# Patient Record
Sex: FEMALE | Race: ASIAN | NOT HISPANIC OR LATINO | Employment: PART TIME | URBAN - METROPOLITAN AREA
[De-identification: names, ages, dates, MRNs, and addresses within clinical notes are randomized per-mention and may not be internally consistent; named-entity substitution may affect disease eponyms.]

---

## 2017-02-13 ENCOUNTER — OFFICE VISIT (OUTPATIENT)
Dept: FAMILY MEDICINE | Facility: CLINIC | Age: 37
End: 2017-02-13
Payer: COMMERCIAL

## 2017-02-13 VITALS
BODY MASS INDEX: 32.59 KG/M2 | WEIGHT: 177.13 LBS | DIASTOLIC BLOOD PRESSURE: 72 MMHG | TEMPERATURE: 98.1 F | OXYGEN SATURATION: 99 % | RESPIRATION RATE: 16 BRPM | HEIGHT: 62 IN | SYSTOLIC BLOOD PRESSURE: 108 MMHG | HEART RATE: 85 BPM

## 2017-02-13 DIAGNOSIS — J02.9 VIRAL PHARYNGITIS: ICD-10-CM

## 2017-02-13 DIAGNOSIS — R07.0 THROAT PAIN: Primary | ICD-10-CM

## 2017-02-13 LAB
DEPRECATED S PYO AG THROAT QL EIA: NORMAL
MICRO REPORT STATUS: NORMAL
SPECIMEN SOURCE: NORMAL

## 2017-02-13 PROCEDURE — 87081 CULTURE SCREEN ONLY: CPT | Performed by: PHYSICIAN ASSISTANT

## 2017-02-13 PROCEDURE — 99213 OFFICE O/P EST LOW 20 MIN: CPT | Performed by: PHYSICIAN ASSISTANT

## 2017-02-13 PROCEDURE — 87880 STREP A ASSAY W/OPTIC: CPT | Performed by: PHYSICIAN ASSISTANT

## 2017-02-13 NOTE — MR AVS SNAPSHOT
After Visit Summary   2/13/2017    Ines Gaitan    MRN: [id number]           Patient Information     Date Of Birth          1980        Visit Information        Provider Department      2/13/2017 2:20 PM Radha Rose PA-C [facility]        Today's Diagnoses     Throat pain    -  1      Care Instructions    [facility]   Discharged by : Veronica PRADO MA    If you have any questions regarding your visit please contact your care team:     [facility] Clinic Hours Telephone Number   Dr. Gill Rose PA-C   7am-7pm Monday - Thursday   7am-5pm Fridays  [phone]   (Appointment scheduling available 24/7)   RN Line   [phone] option 2       For a Price Quote for your services, please call our Consumer Price Line at [phone].     What options do I have for visits at the clinic other than the traditional office visit?     To expand how we care for you, many of our providers are utilizing electronic visits (e-visits) and telephone visits, when medically appropriate, for interactions with their patients rather than a visit in the clinic. We also offer nurse visits for many medical concerns. Just like any other service, we will bill your insurance company for this type of visit based on time spent on the phone with your provider. Not all insurance companies cover these visits. Please check with your medical insurance if this type of visit is covered. You will be responsible for any charges that are not paid by your insurance.   E-visits via [employer]: generally incur a $35.00 fee.     Telephone visits:   Time spent on the phone: *charged based on time that is spent on the phone in increments of 10 minutes. Estimated cost:   5-10 mins $30.00   11-20 mins. $59.00   21-30 mins. $85.00     Use [employer] (secure email communication and access to your chart) to send your primary care provider a message or make an  appointment. Ask someone on your Team how to sign up for [employer].     As always, Thank you for trusting us with your health care needs!      [facility]:    Scheduling Appointments  [place]  Call: [phone]    [place]isty Parekh, [facility]  Call: [phone]    [facility]  Call: [phone]      WHERE TO GO FOR CARE?    Clinic    Make an appointment if you:       Are sick (cold, cough, flu, sore throat, earache or in pain).       Have a small injury (sprain, small cut, burn or broken bone).       Need a physical exam, Pap smear, vaccine or prescription refill.       Have questions about your health or medicines.    To reach us:      Call [phone] ([phone]). Open 24 hours every day. (For counseling services, call [phone].)    Log into [employer] at [email or web address]. (Visit [email or web address] to create an account.) Hospital emergency room    An emergency is a serious or life- threatening problem that must be treated right away.    Call 554 or get to the hospital if you have:      Very bad or sudden:            - Chest pain or pressure         - Bleeding         - Head or belly pain         - Dizziness or trouble seeing, walking or                          Speaking      Problems breathing      Blood in your vomit or you are coughing up blood      A major injury (knocked out, loss of a finger or limb, rape, broken bone protruding from skin)    A mental health crisis. (Or call the Mental Health Crisis line at 1-710.250.1487 or Suicide Prevention Hotline at 1-833.142.7942.)    Open 24 hours every day. You don't need an appointment.     Urgent care    Visit urgent care for sickness or small injuries when the clinic is closed. You don't need an appointment. To check hours or find an urgent care near you, visit [email or web address]. Online care    Get online care from [facility] for more than 70 common problems, like  colds, allergies and infections. Open 24 hours every day at: [email or web address]   Need help deciding?    For advice about where to be seen, you may call your clinic and ask to speak with a nurse. We're here for you 24 hours every day.         If you are deaf or hard of hearing, please let us know. We provide many free services including sign language interpreters, oral interpreters, TTYs, telephone amplifiers, note takers and written materials.                       Follow-ups after your visit        Your next 10 appointments already scheduled     Feb 22, 2017  1:20 PM CST   PHYSICAL with Guy Paulson, DO   [facility]    [address] [place] [id number] [phone]              Who to contact     If you have questions or need follow up information about today's clinic visit or your schedule please contact [facility] directly at [phone].  Normal or non-critical lab and imaging results will be communicated to you by Spectral Imagehart, letter or phone within 4 business days after the clinic has received the results. If you do not hear from us within 7 days, please contact the clinic through [employer] or phone. If you have a critical or abnormal lab result, we will notify you by phone as soon as possible.  Submit refill requests through [employer] or call your pharmacy and they will forward the refill request to us. Please allow 3 business days for your refill to be completed.          Additional Information About Your Visit        [employer] Information     [employer] gives you secure access to your electronic health record. If you see a primary care provider, you can also send messages to your care team and make appointments. If you have questions, please call your primary care clinic.  If you do not have a primary care provider, please call [phone] and they will assist you.        Care EveryWhere ID     This is your Care  "EveryWhere ID. This could be used by other organizations to access your [facility] medical records  [id number]        Your Vitals Were     Pulse Temperature Respirations Height Pulse Oximetry BMI (Body Mass Index)    85 98.1  F (36.7  C) (Oral) 16 5' 2\" (1.575 m) 99% 32.4 kg/m2       Blood Pressure from Last 3 Encounters:   02/13/17 108/72   11/07/16 111/78   10/14/16 108/71    Weight from Last 3 Encounters:   02/13/17 177 lb 2 oz (80.3 kg)   11/07/16 174 lb 3.2 oz (79 kg)   10/14/16 168 lb 14.4 oz (76.6 kg)              We Performed the Following     Beta strep group A culture     Rapid strep screen          Today's Medication Changes          These changes are accurate as of: 2/13/17  3:02 PM.  If you have any questions, ask your nurse or doctor.               Start taking these medicines.        Dose/Directions    lidocaine 2 % solution   Commonly known as:  XYLOCAINE   Used for:  Throat pain   Started by:  Radha Rose PA-C        Dose:  15 mL   Take 15 mLs by mouth every 6 hours as needed for moderate pain swish and spit every 3-8 hours as needed; max 8 doses/24 hour period   Quantity:  100 mL   Refills:  0            Where to get your medicines      These medications were sent to [facility] [address].  [address] [place] [address]     Phone:  [phone]     lidocaine 2 % solution                Primary Care Provider Office Phone # Fax Daniel Cesarjoselin Paulson  [phone]       [place] [phone]        Thank you!     Thank you for choosing [facility]  for your care. Our goal is always to provide you with excellent care. Hearing back from our patients is one way we can continue to improve our services. Please take a few minutes to complete the written survey that you may receive in the mail after your visit with us. Thank you!             Your " Updated Medication List - Protect others around you: Learn how to safely use, store and throw away your medicines at www.disposemymeds.org.          This list is accurate as of: 2/13/17  3:02 PM.  Always use your most recent med list.                   Brand Name Dispense Instructions for use    ferrous sulfate 325 (65 FE) MG tablet    IRON    30 tablet    Take 1 tablet (325 mg) by mouth daily (with breakfast)       lidocaine 2 % solution    XYLOCAINE    100 mL    Take 15 mLs by mouth every 6 hours as needed for moderate pain swish and spit every 3-8 hours as needed; max 8 doses/24 hour period

## 2017-02-13 NOTE — PATIENT INSTRUCTIONS
[facility]   Discharged by : Veronica PRADO MA    If you have any questions regarding your visit please contact your care team:     [facility] Clinic Hours Telephone Number   Dr. Gill Rose PA-C   7am-7pm Monday - Thursday   7am-5pm Fridays  [phone]   (Appointment scheduling available 24/7)   RN Line   [phone] option 2       For a Price Quote for your services, please call our Consumer Price Line at [phone].     What options do I have for visits at the clinic other than the traditional office visit?     To expand how we care for you, many of our providers are utilizing electronic visits (e-visits) and telephone visits, when medically appropriate, for interactions with their patients rather than a visit in the clinic. We also offer nurse visits for many medical concerns. Just like any other service, we will bill your insurance company for this type of visit based on time spent on the phone with your provider. Not all insurance companies cover these visits. Please check with your medical insurance if this type of visit is covered. You will be responsible for any charges that are not paid by your insurance.   E-visits via [employer]: generally incur a $35.00 fee.     Telephone visits:   Time spent on the phone: *charged based on time that is spent on the phone in increments of 10 minutes. Estimated cost:   5-10 mins $30.00   11-20 mins. $59.00   21-30 mins. $85.00     Use [employer] (secure email communication and access to your chart) to send your primary care provider a message or make an appointment. Ask someone on your Team how to sign up for [employer].     As always, Thank you for trusting us with your health care needs!      [facility]:    Scheduling Appointments  [place]  Call: [phone]    Misty Parekh [facility]  Call: [phone]    [facility]  Locations  Call: [phone]      WHERE TO GO FOR CARE?    Clinic    Make an appointment if you:       Are sick (cold, cough, flu, sore throat, earache or in pain).       Have a small injury (sprain, small cut, burn or broken bone).       Need a physical exam, Pap smear, vaccine or prescription refill.       Have questions about your health or medicines.    To reach us:      Call [phone] ([phone]). Open 24 hours every day. (For counseling services, call [phone].)    Log into [employer] at [employer]. (Visit Terralliance to create an account.) Hospital emergency room    An emergency is a serious or life- threatening problem that must be treated right away.    Call 279 or get to the hospital if you have:      Very bad or sudden:            - Chest pain or pressure         - Bleeding         - Head or belly pain         - Dizziness or trouble seeing, walking or                          Speaking      Problems breathing      Blood in your vomit or you are coughing up blood      A major injury (knocked out, loss of a finger or limb, rape, broken bone protruding from skin)    A mental health crisis. (Or call the Mental Health Crisis line at 1-655.911.1532 or Suicide Prevention Hotline at 1-763.986.1704.)    Open 24 hours every day. You don't need an appointment.     Urgent care    Visit urgent care for sickness or small injuries when the clinic is closed. You don't need an appointment. To check hours or find an urgent care near you, visit [email or web address] ([email or web address]. Online care    Get online care from [employer] for more than 70 common problems, like colds, allergies and infections. Open 24 hours every day at: [email or web address] ([email or web address]   Need help deciding?    For advice about where to be seen, you may call your clinic and ask to speak with a nurse. We're here for you 24 hours every day.         If you are deaf or hard of hearing, please let us know. We provide many free services including  sign language interpreters, oral interpreters, TTYs, telephone amplifiers, note takers and written materials.

## 2017-02-13 NOTE — NURSING NOTE
"Chief Complaint   Patient presents with     Pharyngitis       Initial /72 (BP Location: Right arm, Cuff Size: Adult Regular)  Pulse 85  Temp 98.1  F (36.7  C) (Oral)  Resp 16  Ht 5' 2\" (1.575 m)  Wt 177 lb 2 oz (80.3 kg)  SpO2 99%  BMI 32.4 kg/m2 Estimated body mass index is 32.4 kg/(m^2) as calculated from the following:    Height as of this encounter: 5' 2\" (1.575 m).    Weight as of this encounter: 177 lb 2 oz (80.3 kg).  Medication Reconciliation: complete     Gill CRISTINA, Certified Medical Assistant (AAMA)February 13, 2017 2:22 PM      "

## 2017-02-13 NOTE — PROGRESS NOTES
"  SUBJECTIVE:                                                    Ines Gaitan is a 36 year old female who presents to clinic today for the following health issues:      ENT Symptoms             Symptoms: cc Present Absent Comment   Fever/Chills  x  chills   Fatigue  x     Muscle Aches  x  Better today   Eye Irritation   x    Sneezing   x    Nasal Rolando/Drg   x    Sinus Pressure/Pain   x    Loss of smell   x    Dental pain   x    Sore Throat x      Swollen Glands   ?    Ear Pain/Fullness  x  Both ears   Cough  x     Wheeze   x    Chest Pain   x    Shortness of breath   x    Rash   x    Other  x  Headache     Symptom duration:  2/9/17   Symptom severity:  Moderate/severe (yesterday) today is 8/10 for pain   Treatments tried:  cough drops, salt water gargle, dayquil caps   Contacts:  none     Started on 9th with irritation in throat. Throat started hurting with swallowing and increased to the ears.         -------------------------------------    Problem list, Medication list, Allergies, and Medical/Social/Surgical histories reviewed in [facility] and updated as appropriate.    ROS:  A pertinent ROS of the General  HEENT  Cardiovascular  Pulmonary  GI systems is otherwise unremarkable.      OBJECTIVE:                                                    /72 (BP Location: Right arm, Cuff Size: Adult Regular)  Pulse 85  Temp 98.1  F (36.7  C) (Oral)  Resp 16  Ht 5' 2\" (1.575 m)  Wt 177 lb 2 oz (80.3 kg)  SpO2 99%  BMI 32.4 kg/m2   GENERAL: healthy, alert and no distress  HENT: ear canals- normal; TMs- normal; Nose- normal; Mouth- no ulcers, no lesions  NECK: no tenderness, no adenopathy, no asymmetry, no masses, no stiffness; thyroid- normal to palpation  RESP: lungs clear to auscultation - no rales, no rhonchi, no wheezes  CV: regular rates and rhythm, normal S1 S2, no S3 or S4 and no murmur, no click or rub -  MS: extremities- no gross deformities noted, no edema    Diagnostic test results:  none      ASSESSMENT/PLAN:    "                                                       ICD-10-CM    1. Throat pain R07.0 Rapid strep screen     Beta strep group A culture     lidocaine (XYLOCAINE) 2 % solution   2. Viral pharyngitis J02.9        Discussed treatment and supportive care for patient's symptoms.  Patient will try lidocaine for throat pain and use ibuprofen 600 mg or tylenol.     Follow up with Provider - 2 weeks if still coughing, sooner with fevers, shortness of breath or wheezing or if throat pain worsens.     Radha Rose PA-C  [facility]

## 2017-02-15 LAB
BACTERIA SPEC CULT: NORMAL
MICRO REPORT STATUS: NORMAL
SPECIMEN SOURCE: NORMAL

## 2017-03-08 ENCOUNTER — OFFICE VISIT (OUTPATIENT)
Dept: FAMILY MEDICINE | Facility: CLINIC | Age: 37
End: 2017-03-08
Payer: COMMERCIAL

## 2017-03-08 VITALS
BODY MASS INDEX: 31.89 KG/M2 | WEIGHT: 180 LBS | TEMPERATURE: 98.7 F | HEART RATE: 84 BPM | HEIGHT: 63 IN | DIASTOLIC BLOOD PRESSURE: 72 MMHG | SYSTOLIC BLOOD PRESSURE: 100 MMHG

## 2017-03-08 DIAGNOSIS — Z13.5 SCREENING FOR DIABETIC RETINOPATHY: ICD-10-CM

## 2017-03-08 DIAGNOSIS — Z87.42 HISTORY OF FEMALE INFERTILITY: ICD-10-CM

## 2017-03-08 DIAGNOSIS — O03.9 MISCARRIAGE: ICD-10-CM

## 2017-03-08 DIAGNOSIS — E55.9 VITAMIN D DEFICIENCY: ICD-10-CM

## 2017-03-08 DIAGNOSIS — N92.6 MISSED PERIOD: ICD-10-CM

## 2017-03-08 DIAGNOSIS — Z00.01 ENCOUNTER FOR ROUTINE ADULT HEALTH EXAMINATION WITH ABNORMAL FINDINGS: Primary | ICD-10-CM

## 2017-03-08 DIAGNOSIS — D50.9 IRON DEFICIENCY ANEMIA, UNSPECIFIED IRON DEFICIENCY ANEMIA TYPE: ICD-10-CM

## 2017-03-08 DIAGNOSIS — E05.90 SUBCLINICAL HYPERTHYROIDISM: ICD-10-CM

## 2017-03-08 LAB
BASOPHILS # BLD AUTO: 0 10E9/L (ref 0–0.2)
BASOPHILS NFR BLD AUTO: 0.3 %
BETA HCG QUAL IFA URINE: NEGATIVE
DIFFERENTIAL METHOD BLD: ABNORMAL
EOSINOPHIL # BLD AUTO: 0.1 10E9/L (ref 0–0.7)
EOSINOPHIL NFR BLD AUTO: 1.5 %
ERYTHROCYTE [DISTWIDTH] IN BLOOD BY AUTOMATED COUNT: 20.6 % (ref 10–15)
HCT VFR BLD AUTO: 28.8 % (ref 35–47)
HGB BLD-MCNC: 8.4 G/DL (ref 11.7–15.7)
LYMPHOCYTES # BLD AUTO: 2.1 10E9/L (ref 0.8–5.3)
LYMPHOCYTES NFR BLD AUTO: 34 %
MCH RBC QN AUTO: 18.5 PG (ref 26.5–33)
MCHC RBC AUTO-ENTMCNC: 29.2 G/DL (ref 31.5–36.5)
MCV RBC AUTO: 63 FL (ref 78–100)
MONOCYTES # BLD AUTO: 0.6 10E9/L (ref 0–1.3)
MONOCYTES NFR BLD AUTO: 8.9 %
NEUTROPHILS # BLD AUTO: 3.4 10E9/L (ref 1.6–8.3)
NEUTROPHILS NFR BLD AUTO: 55.3 %
PLATELET # BLD AUTO: 328 10E9/L (ref 150–450)
RBC # BLD AUTO: 4.55 10E12/L (ref 3.8–5.2)
WBC # BLD AUTO: 6.2 10E9/L (ref 4–11)

## 2017-03-08 PROCEDURE — 84443 ASSAY THYROID STIM HORMONE: CPT | Performed by: FAMILY MEDICINE

## 2017-03-08 PROCEDURE — 36415 COLL VENOUS BLD VENIPUNCTURE: CPT | Performed by: FAMILY MEDICINE

## 2017-03-08 PROCEDURE — 99212 OFFICE O/P EST SF 10 MIN: CPT | Mod: 25 | Performed by: FAMILY MEDICINE

## 2017-03-08 PROCEDURE — 84703 CHORIONIC GONADOTROPIN ASSAY: CPT | Performed by: FAMILY MEDICINE

## 2017-03-08 PROCEDURE — 85025 COMPLETE CBC W/AUTO DIFF WBC: CPT | Performed by: FAMILY MEDICINE

## 2017-03-08 PROCEDURE — 99395 PREV VISIT EST AGE 18-39: CPT | Performed by: FAMILY MEDICINE

## 2017-03-08 RX ORDER — FERROUS SULFATE 325(65) MG
325 TABLET ORAL 2 TIMES DAILY
Qty: 60 TABLET | Refills: 2 | Status: SHIPPED | OUTPATIENT
Start: 2017-03-08 | End: 2017-10-06

## 2017-03-08 NOTE — PROGRESS NOTES
SUBJECTIVE:     CC: Ines Gaitan is an 36 year old woman who presents for preventive health visit.     Healthy Habits:    Do you get at least three servings of calcium containing foods daily (dairy, green leafy vegetables, etc.)? no    Amount of exercise or daily activities, outside of work: 1 day(s) per week    Problems taking medications regularly No    Medication side effects: No    Have you had an eye exam in the past two years? yes    Do you see a dentist twice per year? no    Do you have sleep apnea, excessive snoring or daytime drowsiness?daytime drowsiness     * discuss thyroid issues, would like to get a referral      Anemia-not taking iron, not bleeding much, no rectal bleeding    Assessment and Plan  Subclinical Hyperthyroidism in pregnancy:  The pt has no significant symptoms that requires treatment. Moreover, during first trimester, Normal TSH range is usually between 0.1-2.5. So her TSH is very close to that range currently. Although unlikely to be Graves'disease, will rule out with TSI.     We will monitor the pt closely during pregnancy and repeat labs every 4 weeks.  -Check TSH, FT4, T4, TSI and TPO in 2 weeks  We will check the labs and communicate with the pt about the results and need for further monitoring.     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  She would like to get pregnant soom  Periods are very regular, no family history of clotting disorder    Periods are 4 days long, 1-2 days are heavy, not taking iron supplements, veg.     Vit D def-not taking supplements      Today's PHQ-2 Score:   PHQ-2 ( 1999 Pfizer) 3/8/2017 3/6/2017   Q1: Little interest or pleasure in doing things 0 -   Q2: Feeling down, depressed or hopeless 0 -   PHQ-2 Score 0 -   Little interest or pleasure in doing things - Not at all   Feeling down, depressed or hopeless  - Not at all   PHQ-2 Score - 0       Abuse: Current or Past(Physical, Sexual or Emotional)- No  Do you feel safe in your environment - Yes    Social History   Substance Use Topics     Smoking status: Never Smoker     Smokeless tobacco: Never Used     Alcohol use No     The patient does not drink >3 drinks per day nor >7 drinks per week.    Recent Labs   Lab Test  14   1928   CHOL  181   HDL  40*   LDL  113   TRIG  141   CHOLHDLRATIO  4.5       Reviewed orders with patient.  Reviewed health maintenance and updated orders accordingly - Yes    Mammo Decision Support:  Mammogram not appropriate for this patient based on age.    Pertinent mammograms are reviewed under the imaging tab.  History of abnormal Pap smear: NO - age 30- 65 PAP every 3 years recommended    Reviewed and updated as needed this visit by clinical staff  Tobacco  Allergies  Med Hx  Surg Hx  Fam Hx  Soc Hx        Reviewed and updated as needed this visit by Provider        Past Medical History   Diagnosis Date     ASCUS of cervix with negative high risk HPV 2014     neg HPV. Plan cotest in 3 years.     MEDICAL HISTORY OF -      infection of L5-S1.  ?abscess Treatment in [place]     Subclinical hyperthyroidism      Check a TSH each trimester     Tuberculosis       History reviewed. No pertinent past surgical history.  Obstetric History       T0      TAB0   SAB2   E0   M0   L0       # Outcome Date GA Lbr Ant/2nd Weight Sex Delivery Anes PTL Lv   2 SAB 10/2016     SAB      1 2015     SAB             ROS:  C: NEGATIVE for fever, chills, change in weight  I: NEGATIVE for worrisome rashes, moles or lesions  E: NEGATIVE for vision changes or irritation  ENT: NEGATIVE for ear, mouth and throat problems  R: NEGATIVE for significant cough or SOB  B: NEGATIVE for masses, tenderness or discharge  CV: NEGATIVE for chest pain, palpitations or peripheral edema  GI: NEGATIVE for nausea, abdominal pain, heartburn, or change  "in bowel habits  : NEGATIVE for unusual urinary or vaginal symptoms. Periods are regular.  M: NEGATIVE for significant arthralgias or myalgia  N: NEGATIVE for weakness, dizziness or paresthesias  P: NEGATIVE for changes in mood or affect    Problem list, Medication list, Allergies, and Medical/Social/Surgical histories reviewed in [facility] and updated as appropriate.  OBJECTIVE:     /72  Pulse 84  Temp 98.7  F (37.1  C) (Oral)  Ht 5' 2.5\" (1.588 m)  Wt 180 lb (81.6 kg)  LMP 02/03/2017 (Exact Date)  Breastfeeding? No  BMI 32.4 kg/m2  EXAM:  GENERAL: healthy, alert and no distress  EYES: Eyes grossly normal to inspection, PERRL and conjunctivae and sclerae normal  HENT: ear canals and TM's normal, nose and mouth without ulcers or lesions  NECK: no adenopathy, no asymmetry, masses, or scars and thyroid normal to palpation  RESP: lungs clear to auscultation - no rales, rhonchi or wheezes  BREAST: normal without masses, tenderness or nipple discharge and no palpable axillary masses or adenopathy  CV: regular rate and rhythm, normal S1 S2, no S3 or S4, no murmur, click or rub, no peripheral edema and peripheral pulses strong  ABDOMEN: soft, nontender, no hepatosplenomegaly, no masses and bowel sounds normal  MS: no gross musculoskeletal defects noted, no edema  SKIN: no suspicious lesions or rashes  NEURO: Normal strength and tone, mentation intact and speech normal  PSYCH: mentation appears normal, affect normal/bright    ASSESSMENT/PLAN:         ICD-10-CM    1. Encounter for routine adult health examination with abnormal findings Z00.01    2. Missed period N92.6 Beta HCG qual IFA urine   3. Miscarriage O03.9 MAT FETAL MED CTR REFERRAL-PRECONCEPTION   4. Vitamin D deficiency E55.9 cholecalciferol (VITAMIN D3) 44967 UNITS capsule   5. History of female infertility Z87.42 TSH with free T4 reflex   6. Subclinical hyperthyroidism E05.90 TSH with free T4 reflex   7. Screening for diabetic retinopathy Z13.5    8. Iron " "deficiency anemia, unspecified iron deficiency anemia type D50.9 CBC with platelets and differential     ferrous sulfate (IRON) 325 (65 FE) MG tablet     Patient with history of 2 miscarriages, wants to get pregnant soon. She is AMA-wants to see a specialist, referred to [facility]  Anemia-iron def, she has not been compliant with iron, no dizziness, no chest pain, advsied iron with OJ and follow up in 4 weeks to rechdck  Vit D def-advised vit D , is not taking daily meds, would prefer weekly, refilled, check labs in 4-6 weeks  Subclinical hypothyroidism-check labs today, has a follow up with endocrine.    COUNSELING:   Reviewed preventive health counseling, as reflected in patient instructions         reports that she has never smoked. She has never used smokeless tobacco.    Estimated body mass index is 32.4 kg/(m^2) as calculated from the following:    Height as of this encounter: 5' 2.5\" (1.588 m).    Weight as of this encounter: 180 lb (81.6 kg).   Weight management plan: Discussed healthy diet and exercise guidelines and patient will follow up in 3 months in clinic to re-evaluate.    Counseling Resources:  ATP IV Guidelines  Pooled Cohorts Equation Calculator  Breast Cancer Risk Calculator  FRAX Risk Assessment  ICSI Preventive Guidelines  Dietary Guidelines for Americans, 2010  USDA's MyPlate  ASA Prophylaxis  Lung CA Screening    Guy Paulson DO  [facility]  "

## 2017-03-08 NOTE — MR AVS SNAPSHOT
After Visit Summary   3/8/2017    Ines Gaitan    MRN: [id number]           Patient Information     Date Of Birth          1980        Visit Information        Provider Department      3/8/2017 12:20 PM Guy Paulson DO [facility]        Today's Diagnoses     Screening for diabetic retinopathy    -  1    Missed period        Iron deficiency anemia, unspecified iron deficiency anemia type        Vitamin D deficiency        History of female infertility        Subclinical hyperthyroidism        Miscarriage          Care Instructions    Your hemoglobin is abnormal , please take iron  Recheck in 8 weeks here  Take vitamin D  Follow up with endocrine, MFM(pregnancy questions)    Preventive Health Recommendations  Female Ages 26 - 39  Yearly exam:   See your health care provider every year in order to    Review health changes.     Discuss preventive care.      Review your medicines if you your doctor has prescribed any.    Until age 30: Get a Pap test every three years (more often if you have had an abnormal result).    After age 30: Talk to your doctor about whether you should have a Pap test every 3 years or have a Pap test with HPV screening every 5 years.   You do not need a Pap test if your uterus was removed (hysterectomy) and you have not had cancer.  You should be tested each year for STDs (sexually transmitted diseases), if you're at risk.   Talk to your provider about how often to have your cholesterol checked.  If you are at risk for diabetes, you should have a diabetes test (fasting glucose).  Shots: Get a flu shot each year. Get a tetanus shot every 10 years.   Nutrition:     Eat at least 5 servings of fruits and vegetables each day.    Eat whole-grain bread, whole-wheat pasta and brown rice instead of white grains and rice.    Talk to your provider about Calcium and Vitamin D.     Lifestyle    Exercise at least 150 minutes a week (30 minutes a day, 5 days of  the week). This will help you control your weight and prevent disease.    Limit alcohol to one drink per day.    No smoking.     Wear sunscreen to prevent skin cancer.    See your dentist every six months for an exam and cleaning.          Follow-ups after your visit        Additional Services     MAT FETAL MED CTR REFERRAL-PRECONCEPTION       >> Patient may proceed with recommendations for further testing as directed by the Maternal Fetal Medicine Specialist >>    Dear Patient:   Please be aware that coverage of these services is subject to the terms and limitations of your health insurance plan.  Call member services at your health plan with any benefit or coverage questions.      Please bring the following to your appointment:    >>  Any x-rays, CTs or MRIs which have been performed.  Contact the facility where they were done to arrange for  prior to your scheduled appointment.  Any new CT, MRI or other procedures ordered by your specialist must be performed at a [place] facility or coordinated by your clinic's referral office.  >>  List of current medications   >>  This referral request   >>  Any documents/labs given to you for this referral                    Who to contact     If you have questions or need follow up information about today's clinic visit or your schedule please contact [facility] directly at [phone].  Normal or non-critical lab and imaging results will be communicated to you by MyChart, letter or phone within 4 business days after the clinic has received the results. If you do not hear from us within 7 days, please contact the clinic through MyChart or phone. If you have a critical or abnormal lab result, we will notify you by phone as soon as possible.  Submit refill requests through [employer] or call your pharmacy and they will forward the refill request to us. Please allow 3 business days for your refill to be completed.          Additional Information About  "Your Visit        [employer] Information     [employer] gives you secure access to your electronic health record. If you see a primary care provider, you can also send messages to your care team and make appointments. If you have questions, please call your primary care clinic.  If you do not have a primary care provider, please call [phone] and they will assist you.        Care EveryWhere ID     This is your Care EveryWhere ID. This could be used by other organizations to access your [place] medical records  [id number]        Your Vitals Were     Pulse Temperature Height Last Period Breastfeeding? BMI (Body Mass Index)    84 98.7  F (37.1  C) (Oral) 5' 2.5\" (1.588 m) 02/03/2017 (Exact Date) No 32.4 kg/m2       Blood Pressure from Last 3 Encounters:   03/08/17 100/72   02/13/17 108/72   11/07/16 111/78    Weight from Last 3 Encounters:   03/08/17 180 lb (81.6 kg)   02/13/17 177 lb 2 oz (80.3 kg)   11/07/16 174 lb 3.2 oz (79 kg)              We Performed the Following     Beta HCG qual IFA urine     CBC with platelets and differential     MAT FETAL MED CTR REFERRAL-PRECONCEPTION     TSH with free T4 reflex          Today's Medication Changes          These changes are accurate as of: 3/8/17  1:45 PM.  If you have any questions, ask your nurse or doctor.               Start taking these medicines.        Dose/Directions    cholecalciferol 70125 UNITS capsule   Commonly known as:  VITAMIN D3   Used for:  Vitamin D deficiency   Started by:  Guy Paulson DO        Dose:  1 capsule   Take 1 capsule (50,000 Units) by mouth once a week   Quantity:  10 capsule   Refills:  3       ferrous sulfate 325 (65 FE) MG tablet   Commonly known as:  IRON   Used for:  Iron deficiency anemia, unspecified iron deficiency anemia type   Started by:  Guy Paulson DO        Dose:  325 mg   Take 1 tablet (325 mg) by mouth 2 times daily   Quantity:  60 tablet   Refills:  2            Where to get your medicines    "   These medications [place] [facility] [place] [address]  [address] [facility] [address]     Phone:  [phone]     cholecalciferol 20314 UNITS capsule    ferrous sulfate 325 (65 FE) MG tablet                Primary Care Provider Office Phone # Fax #    Guy Paulson [phone]       [facility] [address] [address]        Thank you!     Thank you for choosing [facility]  for your care. Our goal is always to provide you with excellent care. Hearing back from our patients is one way we can continue to improve our services. Please take a few minutes to complete the written survey that you may receive in the mail after your visit with us. Thank you!             Your Updated Medication List - Protect others around you: Learn how to safely use, store and throw away your medicines at www.disposemymeds.org.          This list is accurate as of: 3/8/17  1:45 PM.  Always use your most recent med list.                   Brand Name Dispense Instructions for use    cholecalciferol 29847 UNITS capsule    VITAMIN D3    10 capsule    Take 1 capsule (50,000 Units) by mouth once a week       ferrous sulfate 325 (65 FE) MG tablet    IRON    60 tablet    Take 1 tablet (325 mg) by mouth 2 times daily

## 2017-03-08 NOTE — PATIENT INSTRUCTIONS
Your hemoglobin is abnormal , please take iron  Recheck in 8 weeks here  Take vitamin D  Follow up with endocrine, MFM(pregnancy questions)    Preventive Health Recommendations  Female Ages 26 - 39  Yearly exam:   See your health care provider every year in order to    Review health changes.     Discuss preventive care.      Review your medicines if you your doctor has prescribed any.    Until age 30: Get a Pap test every three years (more often if you have had an abnormal result).    After age 30: Talk to your doctor about whether you should have a Pap test every 3 years or have a Pap test with HPV screening every 5 years.   You do not need a Pap test if your uterus was removed (hysterectomy) and you have not had cancer.  You should be tested each year for STDs (sexually transmitted diseases), if you're at risk.   Talk to your provider about how often to have your cholesterol checked.  If you are at risk for diabetes, you should have a diabetes test (fasting glucose).  Shots: Get a flu shot each year. Get a tetanus shot every 10 years.   Nutrition:     Eat at least 5 servings of fruits and vegetables each day.    Eat whole-grain bread, whole-wheat pasta and brown rice instead of white grains and rice.    Talk to your provider about Calcium and Vitamin D.     Lifestyle    Exercise at least 150 minutes a week (30 minutes a day, 5 days of the week). This will help you control your weight and prevent disease.    Limit alcohol to one drink per day.    No smoking.     Wear sunscreen to prevent skin cancer.    See your dentist every six months for an exam and cleaning.

## 2017-03-08 NOTE — NURSING NOTE
"Chief Complaint   Patient presents with     Physical       Initial /72  Pulse 84  Temp 98.7  F (37.1  C) (Oral)  Ht 5' 2.5\" (1.588 m)  Wt 180 lb (81.6 kg)  LMP 02/03/2017 (Exact Date)  Breastfeeding? No  BMI 32.4 kg/m2 Estimated body mass index is 32.4 kg/(m^2) as calculated from the following:    Height as of this encounter: 5' 2.5\" (1.588 m).    Weight as of this encounter: 180 lb (81.6 kg).  Medication Reconciliation: complete   Radha Rincon CMA (AAMA)      "

## 2017-03-08 NOTE — LETTER
[facility]  [address] [id number] [phone]      March 10, 2017      Ines Gaitan  [address]   [place] [address]          Dear Ms. Gaitan    The results of your recent lab tests were within normal limits. Enclosed is a copy of these results.  If you have any further questions or problems, please contact our office.    Results for orders placed or performed in visit on 03/08/17   Beta HCG qual IFA urine   Result Value Ref Range    Beta HCG Qual IFA Urine Negative NEG   TSH with free T4 reflex   Result Value Ref Range    TSH 1.35 0.40 - 4.00 mU/L   CBC with platelets and differential   Result Value Ref Range    WBC 6.2 4.0 - 11.0 10e9/L    RBC Count 4.55 3.8 - 5.2 10e12/L    Hemoglobin 8.4 (L) 11.7 - 15.7 g/dL    Hematocrit 28.8 (L) 35.0 - 47.0 %    MCV 63 (L) 78 - 100 fl    MCH 18.5 (L) 26.5 - 33.0 pg    MCHC 29.2 (L) 31.5 - 36.5 g/dL    RDW 20.6 (H) 10.0 - 15.0 %    Platelet Count 328 150 - 450 10e9/L    Diff Method Automated Method     % Neutrophils 55.3 %    % Lymphocytes 34.0 %    % Monocytes 8.9 %    % Eosinophils 1.5 %    % Basophils 0.3 %    Absolute Neutrophil 3.4 1.6 - 8.3 10e9/L    Absolute Lymphocytes 2.1 0.8 - 5.3 10e9/L    Absolute Monocytes 0.6 0.0 - 1.3 10e9/L    Absolute Eosinophils 0.1 0.0 - 0.7 10e9/L    Absolute Basophils 0.0 0.0 - 0.2 10e9/L         Sincerely,      Guy Paulson DO/estiven

## 2017-03-09 LAB — TSH SERPL DL<=0.005 MIU/L-ACNC: 1.35 MU/L (ref 0.4–4)

## 2017-05-08 ENCOUNTER — OFFICE VISIT (OUTPATIENT)
Dept: FAMILY MEDICINE | Facility: CLINIC | Age: 37
End: 2017-05-08

## 2017-05-08 VITALS
WEIGHT: 176 LBS | DIASTOLIC BLOOD PRESSURE: 72 MMHG | SYSTOLIC BLOOD PRESSURE: 110 MMHG | HEART RATE: 70 BPM | BODY MASS INDEX: 31.18 KG/M2 | HEIGHT: 63 IN | TEMPERATURE: 97.7 F

## 2017-05-08 DIAGNOSIS — E55.9 VITAMIN D DEFICIENCY: Primary | ICD-10-CM

## 2017-05-08 DIAGNOSIS — D50.9 IRON DEFICIENCY ANEMIA, UNSPECIFIED IRON DEFICIENCY ANEMIA TYPE: ICD-10-CM

## 2017-05-08 DIAGNOSIS — M54.9 UPPER BACK PAIN ON RIGHT SIDE: ICD-10-CM

## 2017-05-08 DIAGNOSIS — E05.90 SUBCLINICAL HYPERTHYROIDISM: ICD-10-CM

## 2017-05-08 DIAGNOSIS — Z78.9 VEGETARIAN DIET: ICD-10-CM

## 2017-05-08 PROCEDURE — 99213 OFFICE O/P EST LOW 20 MIN: CPT | Performed by: FAMILY MEDICINE

## 2017-05-08 NOTE — PROGRESS NOTES
SUBJECTIVE:                                                    Ines Gaitan is a 36 year old female who presents to clinic today for the following health issues:      Concern - here to discuss lab results       Recheck after low hgb,    She has not been taking her iron  Took it for 2 weeks  Period 4/8/17  veginatiran and does not take lots of iron      Upper back pain on the right  She has been moving this month, no weakness, no numbness  Fatigued , she has history of subclinical hypothyroidism, does not want any labs today as her insurance may not cover it  No hair loss, no weight changes  Mood irritability but has had a recent miscarriage and does not feel like she has dealth with it, declined therapy.  No suicidal or homicidal ideation      Problem list and histories reviewed & adjusted, as indicated.  Additional history: as documented    Patient Active Problem List   Diagnosis     CARDIOVASCULAR SCREENING; LDL GOAL LESS THAN 160     Chronic constipation     Vitamin D deficiency     Pain in thoracic spine     Subclinical hyperthyroidism     Iron deficiency anemia, unspecified iron deficiency anemia type     History of female infertility     History reviewed. No pertinent surgical history.    Social History   Substance Use Topics     Smoking status: Never Smoker     Smokeless tobacco: Never Used     Alcohol use No     Family History   Problem Relation Age of Onset     Hypertension Mother      DIABETES Father      CEREBROVASCULAR DISEASE Father      Heart Surgery Father      Thyroid Disease Sister      CANCER Maternal Grandmother      liver     Macular Degeneration No family hx of      Glaucoma No family hx of            Reviewed and updated as needed this visit by clinical staff  Tobacco  Allergies  Med Hx  Surg Hx  Fam Hx  Soc Hx      Reviewed and updated as needed this visit by Provider         ROS:  Constitutional, HEENT, cardiovascular, pulmonary, GI, , musculoskeletal, neuro, skin, endocrine and  "psych systems are negative, except as otherwise noted.    OBJECTIVE:                                                    /72 (BP Location: Right arm, Patient Position: Chair, Cuff Size: Adult Large)  Pulse 70  Temp 97.7  F (36.5  C) (Oral)  Ht 5' 2.5\" (1.588 m)  Wt 176 lb (79.8 kg)  LMP 05/08/2017  BMI 31.68 kg/m2  Body mass index is 31.68 kg/(m^2).  GENERAL: healthy, alert and no distress  EYES: Eyes grossly normal to inspection, PERRL and conjunctivae and sclerae normal  HENT: ear canals and TM's normal, nose and mouth without ulcers or lesions  NECK: no adenopathy, no asymmetry, masses, or scars and thyroid normal to palpation  RESP: lungs clear to auscultation - no rales, rhonchi or wheezes  CV: regular rate and rhythm, normal S1 S2, no S3 or S4, no murmur, click or rub, no peripheral edema and peripheral pulses strong  ABDOMEN: soft, nontender, no hepatosplenomegaly, no masses and bowel sounds normal  MS: no gross musculoskeletal defects noted, no edema  BACK: no CVA tenderness, no paralumbar tenderness  Comprehensive back pain exam:  No tenderness, Range of motion not limited by pain and Lower extremity strength functional and equal on both sides    Diagnostic Test Results:  none      ASSESSMENT/PLAN:                                                        ICD-10-CM    1. Vitamin D deficiency E55.9 NUTRITION REFERRAL   2. Subclinical hyperthyroidism E05.90    3. Iron deficiency anemia, unspecified iron deficiency anemia type D50.9 ferrous sulfate (SLO-FE) 142 (45 FE) MG TBCR     NUTRITION REFERRAL   4. Vegetarian diet Z78.9 NUTRITION REFERRAL   5. Upper back pain on right side M54.9      History of iron def/vit D-vegetarian, advised iron supplement and seeing nutritionist, does not want labs to be done today  Advised restarting supplements and following up in 3-4 weeks  Some back strain due to recent move, declined referral, will use heat    See Patient Instructions    Guy Paulson, " DO  [facility]

## 2017-05-08 NOTE — NURSING NOTE
"Chief Complaint   Patient presents with     RECHECK     lab results       Initial /72 (BP Location: Right arm, Patient Position: Chair, Cuff Size: Adult Large)  Pulse 70  Temp 97.7  F (36.5  C) (Oral)  Ht 5' 2.5\" (1.588 m)  Wt 176 lb (79.8 kg)  LMP 05/08/2017  BMI 31.68 kg/m2 Estimated body mass index is 31.68 kg/(m^2) as calculated from the following:    Height as of this encounter: 5' 2.5\" (1.588 m).    Weight as of this encounter: 176 lb (79.8 kg).  Medication Reconciliation: complete    "

## 2017-05-08 NOTE — MR AVS SNAPSHOT
After Visit Summary   5/8/2017    Ines Gaitan    MRN: [id number]           Patient Information     Date Of Birth          1980        Visit Information        Provider Department      5/8/2017 11:00 AM Guy Paulson DO [facility]        Today's Diagnoses     Vitamin D deficiency    -  1    Subclinical hyperthyroidism        Iron deficiency anemia, unspecified iron deficiency anemia type        Vegetarian diet          Care Instructions    Restart iron, vit D  Check back with us in 4 weeks  Go see nutritionist  Use heat massage for back pain, consider physical therapy if not improving  Guy Paulson D.O.    [facility]   Discharged by : Flory Rutherford MA    Paper scripts provided to patient : no     If you have any questions regarding your visit please contact your care team:     [facility] Clinic Hours Telephone Number   [address] Dr., Dr.   7am-7pm Monday - Thursday   7am-5pm Fridays  [phone]   (Appointment scheduling available 24/7)   RN Line   [phone] option 2       For a Price Quote for your services, please call our Consumer Price Line at [phone].     What options do I have for visits at the clinic other than the traditional office visit?     To expand how we care for you, many of our providers are utilizing electronic visits (e-visits) and telephone visits, when medically appropriate, for interactions with their patients rather than a visit in the clinic. We also offer nurse visits for many medical concerns. Just like any other service, we will bill your insurance company for this type of visit based on time spent on the phone with your provider. Not all insurance companies cover these visits. Please check with your medical insurance if this type of visit is covered. You will be responsible for any charges that are not paid by your  insurance.   E-visits via [employer]: generally incur a $35.00 fee.     Telephone visits:   Time spent on the phone: *charged based on time that is spent on the phone in increments of 10 minutes. Estimated cost:   5-10 mins $30.00   11-20 mins. $59.00   21-30 mins. $85.00     Use [employer] (secure email communication and access to your chart) to send your primary care provider a message or make an appointment. Ask someone on your Team how to sign up for [employer].     As always, Thank you for trusting us with your health care needs!      [facility]:    Scheduling Appointments  [facility]  Call: [phone]    [facility]  Call: [phone]    [facility]  Call: [phone]      WHERE TO GO FOR CARE?    Clinic    Make an appointment if you:       Are sick (cold, cough, flu, sore throat, earache or in pain).       Have a small injury (sprain, small cut, burn or broken bone).       Need a physical exam, Pap smear, vaccine or prescription refill.       Have questions about your health or medicines.    To reach us:      Call [phone] ([phone]). Open 24 hours every day. (For counseling services, call [phone].)    Log into [employer] at [email or web address]. (Visit [email or web address] to create an account.) Hospital emergency room    An emergency is a serious or life- threatening problem that must be treated right away.    Call [phone] or get to the hospital if you have:      Very bad or sudden:            - Chest pain or pressure         - Bleeding         - Head or belly pain         - Dizziness or trouble seeing, walking or                          Speaking      Problems breathing      Blood in your vomit or you are coughing up blood      A major injury (knocked out, loss of a finger or limb, rape, broken bone protruding from skin)    A mental health crisis. (Or call the Mental Health Crisis line at 1-875.891.1767 or  Suicide Prevention Hotline at 1-428.953.6141.)    Open 24 hours every day. You don't need an appointment.     Urgent care    Visit urgent care for sickness or small injuries when the clinic is closed. You don't need an appointment. To check hours or find an urgent care near you, visit [email or web address]. Online care    Get online care from [facility] for more than 70 common problems, like colds, allergies and infections. Open 24 hours every day at: [email or web address]   Need help deciding?    For advice about where to be seen, you may call your clinic and ask to speak with a nurse. We're here for you 24 hours every day.         If you are deaf or hard of hearing, please let us know. We provide many free services including sign language interpreters, oral interpreters, TTYs, telephone amplifiers, note takers and written materials.                       Follow-ups after your visit        Additional Services     NUTRITION REFERRAL       Your provider has referred you to: [facility]: [place] [facility] Abhijit Masterson [phone]   [email or web address]    Please be aware that coverage of these services is subject to the terms and limitations of your health insurance plan.  Call member services at your health plan with any benefit or coverage questions.      Please bring the following with you to your appointment:    (1) This referral request  (2) Any documents given to you regarding this referral  (3) Any specific questions you have about diet and/or food choices                  Who to contact     If you have questions or need follow up information about today's clinic visit or your schedule please contact [facility] directly at [phone].  Normal or non-critical lab and imaging results will be communicated to you by MyChart, letter or phone within 4 business days after the clinic has received the results. If you do not hear from us within 7 days, please contact the clinic  "through [employer] or phone. If you have a critical or abnormal lab result, we will notify you by phone as soon as possible.  Submit refill requests through [employer] or call your pharmacy and they will forward the refill request to us. Please allow 3 business days for your refill to be completed.          Additional Information About Your Visit        [employer]Bin Information     [employer] gives you secure access to your electronic health record. If you see a primary care provider, you can also send messages to your care team and make appointments. If you have questions, please call your primary care clinic.  If you do not have a primary care provider, please call [phone] and they will assist you.        Care EveryWhere ID     This is your Care EveryWhere ID. This could be used by other organizations to access your [facility] medical records  [id number]        Your Vitals Were     Pulse Temperature Height Last Period BMI (Body Mass Index)       70 97.7  F (36.5  C) (Oral) 5' 2.5\" (1.588 m) 05/08/2017 31.68 kg/m2        Blood Pressure from Last 3 Encounters:   05/08/17 110/72   03/08/17 100/72   02/13/17 108/72    Weight from Last 3 Encounters:   05/08/17 176 lb (79.8 kg)   03/08/17 180 lb (81.6 kg)   02/13/17 177 lb 2 oz (80.3 kg)              We Performed the Following     NUTRITION REFERRAL          Today's Medication Changes          These changes are accurate as of: 5/8/17 11:42 AM.  If you have any questions, ask your nurse or doctor.               These medicines have changed or have updated prescriptions.        Dose/Directions    * ferrous sulfate 325 (65 FE) MG tablet   Commonly known as:  IRON   This may have changed:  Another medication with the same name was added. Make sure you understand how and when to take each.   Used for:  Iron deficiency anemia, unspecified iron deficiency anemia type   Changed by:  Guy Paulson DO        Dose:  325 mg   Take 1 tablet (325 mg) by mouth 2 times daily "   Quantity:  60 tablet   Refills:  2       * ferrous sulfate 142 (45 FE) MG Tbcr   Commonly known as:  SLO-FE   This may have changed:  You were already taking a medication with the same name, and this prescription was added. Make sure you understand how and when to take each.   Used for:  Iron deficiency anemia, unspecified iron deficiency anemia type   Changed by:  Guy Paulson DO        Dose:  142 mg   Take 1 tablet (142 mg) by mouth daily   Quantity:  90 tablet   Refills:  11       * Notice:  This list has 2 medication(s) that are the same as other medications prescribed for you. Read the directions carefully, and ask your doctor or other care provider to review them with you.         Where to get your medicines      These medications were sent to [facility] [address].  [address] [place], [place] [id number]     Phone:  [phone]     ferrous sulfate 142 (45 FE) MG Tbcr                Primary Care Provider Office Phone # Fax #    Guy Paulson [phone]       [place] [id number]        Thank you!     Thank you for choosing [facility]  for your care. Our goal is always to provide you with excellent care. Hearing back from our patients is one way we can continue to improve our services. Please take a few minutes to complete the written survey that you may receive in the mail after your visit with us. Thank you!             Your Updated Medication List - Protect others around you: Learn how to safely use, store and throw away your medicines at www.disposemymeds.org.          This list is accurate as of: 5/8/17 11:42 AM.  Always use your most recent med list.                   Brand Name Dispense Instructions for use    cholecalciferol 43402 UNITS capsule    VITAMIN D3    10 capsule    Take 1 capsule (50,000 Units) by mouth once a week       *  ferrous sulfate 325 (65 FE) MG tablet    IRON    60 tablet    Take 1 tablet (325 mg) by mouth 2 times daily       * ferrous sulfate 142 (45 FE) MG Tbcr    SLO-FE    90 tablet    Take 1 tablet (142 mg) by mouth daily       * Notice:  This list has 2 medication(s) that are the same as other medications prescribed for you. Read the directions carefully, and ask your doctor or other care provider to review them with you.

## 2017-05-08 NOTE — PATIENT INSTRUCTIONS
Restart iron, vit D  Check back with us in 4 weeks  Go see nutritionist  Use heat massage for back pain, consider physical therapy if not improving  Guy Paluson D.O.    [facility]   Discharged by : Flory Rutherfodr MA    Paper scripts provided to patient : no     If you have any questions regarding your visit please contact your care team:     [facility] Clinic Hours Telephone Number   Dr. Gill Rose PAORION Samuel   7am-7pm Monday - Thursday   7am-5pm Fridays  [phone]   (Appointment scheduling available 24/7)   RN Line   [phone] option 2       For a Price Quote for your services, please call our Consumer Price Line at [phone].     What options do I have for visits at the clinic other than the traditional office visit?     To expand how we care for you, many of our providers are utilizing electronic visits (e-visits) and telephone visits, when medically appropriate, for interactions with their patients rather than a visit in the clinic. We also offer nurse visits for many medical concerns. Just like any other service, we will bill your insurance company for this type of visit based on time spent on the phone with your provider. Not all insurance companies cover these visits. Please check with your medical insurance if this type of visit is covered. You will be responsible for any charges that are not paid by your insurance.   E-visits via [employer]: generally incur a $35.00 fee.     Telephone visits:   Time spent on the phone: *charged based on time that is spent on the phone in increments of 10 minutes. Estimated cost:   5-10 mins $30.00   11-20 mins. $59.00   21-30 mins. $85.00     Use [email or web address] (secure email communication and access to your chart) to send your primary care provider a message or make an appointment. Ask someone on your Team how to sign up for [employer].     As always, Thank you for trusting us  with your health care needs!      [facility]:    Scheduling Appointments  [facility], [facility]  Call: [phone]    [facility]  Call: [phone]    [facility]  Call: [phone]      WHERE TO GO FOR CARE?    Clinic    Make an appointment if you:       Are sick (cold, cough, flu, sore throat, earache or in pain).       Have a small injury (sprain, small cut, burn or broken bone).       Need a physical exam, Pap smear, vaccine or prescription refill.       Have questions about your health or medicines.    To reach us:      Call [phone] ([phone]). Open 24 hours every day. (For counseling services, call [phone].)    Log into [employer] at [employer]. (Visit [email or web address] to create an account.) Hospital emergency room    An emergency is a serious or life- threatening problem that must be treated right away.    Call [phone] or get to the hospital if you have:      Very bad or sudden:            - Chest pain or pressure         - Bleeding         - Head or belly pain         - Dizziness or trouble seeing, walking or                          Speaking      Problems breathing      Blood in your vomit or you are coughing up blood      A major injury (knocked out, loss of a finger or limb, rape, broken bone protruding from skin)    A mental health crisis. (Or call the Mental Health Crisis line at 1-400.401.9536 or Suicide Prevention Hotline at 1-920.144.6172.)    Open 24 hours every day. You don't need an appointment.     Urgent care    Visit urgent care for sickness or small injuries when the clinic is closed. You don't need an appointment. To check hours or find an urgent care near you, visit [email or web address]. Online care    Get online care from [facility] [employer] for more than 70 common problems, like colds, allergies and infections. Open 24 hours every day at: [email or web address]   Need help  deciding?    For advice about where to be seen, you may call your clinic and ask to speak with a nurse. We're here for you 24 hours every day.         If you are deaf or hard of hearing, please let us know. We provide many free services including sign language interpreters, oral interpreters, TTYs, telephone amplifiers, note takers and written materials.

## 2017-07-06 ENCOUNTER — OFFICE VISIT (OUTPATIENT)
Dept: NUTRITION | Facility: CLINIC | Age: 37
End: 2017-07-06
Payer: COMMERCIAL

## 2017-07-06 VITALS — BODY MASS INDEX: 32.04 KG/M2 | WEIGHT: 178 LBS

## 2017-07-06 DIAGNOSIS — Z78.9 VEGETARIAN DIET: ICD-10-CM

## 2017-07-06 DIAGNOSIS — E55.9 VITAMIN D DEFICIENCY: ICD-10-CM

## 2017-07-06 DIAGNOSIS — D50.9 IRON DEFICIENCY ANEMIA, UNSPECIFIED IRON DEFICIENCY ANEMIA TYPE: Primary | ICD-10-CM

## 2017-07-06 PROCEDURE — 97802 MEDICAL NUTRITION INDIV IN: CPT

## 2017-07-06 NOTE — PATIENT INSTRUCTIONS
Try to consume foods that contain iron along with foods that contain Vitamin C to increase absorption    Begin taking your iron supplement with breakfast and Vitamin D3 with dinner     Try to find a milk or milk substitute or yogurt (such as almond, rice, soy, coconut, etc) that is fortified with Vitamin D3. Recommend at least 3 servings of dairy or dairy substitute daily      Make sure to get in at least 8 glasses of water in daily and 30 minutes of activity at least 5 days/week to help prevent constipation    FOLLOW-UP: Tuesday August 29th at 11:30  Recommend you see Dr. Paulson 1 month from now

## 2017-07-06 NOTE — PROGRESS NOTES
Medical Nutrition Therapy  Visit Type:Initial assessment and intervention    Ines Gaitan presents today for MNT and education related to iron deficiency anemia and vitamin D deficiency in vegetarian diet.   She is accompanied by self.     ASSESSMENT:   Patient comments/concerns relating to nutrition: desires to improve her diet to increase her iron and vitamin D levels. Also desires to conceive in the future, has had 2 miscarriages recently.   Reports she is good at taking care of others but doesn't put herself first and she sees this in her diet. She doesn't eat consistently.   Moved to the [place] 4 [place] ago- states she has been a vegetarian her whole life.     NUTRITION HISTORY:  Reports scheduled makes it hard to eat consistently. Doesn't eat a lot of sweets.  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   Patient wakes up around 8-8:30 am but this can vary.   Breakfast: 11 am- vegetables + rice OR oats with vegetables OR spinach with cheese   Lunch:  Often skips   Dinner: 6-11 pm- lentils + bread   Snacks: none. Will snack on fruits.   Beverages: Water 3-4 glasses/day. May have some fresh juice. Drinks regular soda such as Sprite (1 can/day)   No coffee or tea. Will drink coffee when has period.   Doesn't consume alcohol.      Misses meals? Often skips lunch.   Eats out:  Daily- husbands restaurant    Previous diet education:  No     Food allergies/intolerances: vegetarian- no other allergies, doesn't like gooey foods.   Likes to walk and do some stretching/yoga     Diet is high in: fiber and vegetables  Diet is low in: calcium, fruits, iron and vitamin D    EXERCISE: currently no physical activity  Has followed a schedule in the past     SOCIO/ECONOMIC:   Lives with: spouse    MEDICATIONS:  Current Outpatient Prescriptions   Medication     ferrous sulfate (SLO-FE) 142 (45 FE) MG TBCR     ferrous sulfate (IRON) 325 (65 FE) MG tablet     cholecalciferol (VITAMIN D3) 73354 UNITS capsule      No current facility-administered medications for this visit.        LABS:  Last Basic Metabolic Panel:  Lab Results   Component Value Date     07/11/2016      Lab Results   Component Value Date    POTASSIUM 4.2 07/11/2016     Lab Results   Component Value Date    CHLORIDE 106 07/11/2016     Lab Results   Component Value Date    WICHO 8.9 07/11/2016     Lab Results   Component Value Date    CO2 25 07/11/2016     Lab Results   Component Value Date    BUN 10 07/11/2016     Lab Results   Component Value Date    CR 0.72 07/11/2016     Lab Results   Component Value Date    GLC 70 07/11/2016       ANTHROPOMETRICS:  Vitals: Wt 178 lb (80.7 kg)  BMI 32.04 kg/m2  Body mass index is 32.04 kg/(m^2).      Wt Readings from Last 5 Encounters:   05/08/17 176 lb (79.8 kg)   03/08/17 180 lb (81.6 kg)   02/13/17 177 lb 2 oz (80.3 kg)   11/07/16 174 lb 3.2 oz (79 kg)   10/14/16 168 lb 14.4 oz (76.6 kg)       Weight Change: stable     ESTIMATED KCAL REQUIREMENTS:  1055-4406 kcal per day to promote weight loss     NUTRITION DIAGNOSIS: Altered nutrition-related laboratory values (hemoglobin) related to insufficient iron intake 2/2 vegetarian diet as evidenced by hemoglobin value of 8.4g/dL     NUTRITION INTERVENTION:  Nutrition Prescription: follow my plate planner for portion control. Focused primarily on food sources that provide iron and increase absorption of iron as well as food sources for vitamin D. Encouraged her to consume iron food sources along with vitamin C to increase non-heme iron absorption in plant based foods. Strongly recommended she begin taking iron and vitamin D supplement, especially with plan to try to conceive in the near future. It would not be a bad idea to take a prenatal vitamin to ensure she is getting enough folic acid. Recommended she aim for at least 3 servings of dairy or dairy substitutes daily to increase consumption of calcium/vitamin D. She would benefit from increasing water intake as well  as increasing exercise for constipation prevention and weight management.   Education given to support: general nutrition guidelines, vegetarian nutrition therapy (both during pregnancy and standard), and iron deficiency anemia.   Education Materials Provided: My Plate Planner, 100 Calorie Snack List, Vegetarian General Medical Nutrition Therapy, Vegetarian Medical Nutrition Therapy in Pregnancy, and Iron Deficiency Anemia Medical Nutrition Therapy    PATIENT'S BEHAVIOR CHANGE GOALS:   See Patient Instructions for patient stated behavior change goals. AVS was printed and given to patient at today's appointment.    MONITOR / EVALUATE:  RD will monitor/evaluate:  Food / Beverage / Nutrient intake   Pertinent Labs  Progress toward meeting stated nutrition-related goals  Readiness to change nutrition-related behaviors  Weight change    FOLLOW-UP:  Follow-up appointment scheduled on Aug 29.  Follow-up with PCP recommended in 1 month as patient plans to start supplements tomorrow      Kathrin Gil RD, LD, CDE  Time spent in minutes: 50  Encounter: Individual

## 2017-07-06 NOTE — MR AVS SNAPSHOT
After Visit Summary   7/6/2017    Ines Gaitan    MRN: [id number]           Patient Information     Date Of Birth          1980        Visit Information        Provider Department      7/6/2017 2:00 PM BE NUTRITION RESOURCE [facility]        Care Instructions    Try to consume foods that contain iron along with foods that contain Vitamin C to increase absorption    Begin taking your iron supplement with breakfast and Vitamin D3 with dinner     Try to find a milk or milk substitute or yogurt (such as almond, rice, soy, coconut, etc) that is fortified with Vitamin D3. Recommend at least 3 servings of dairy or dairy substitute daily      Make sure to get in at least 8 glasses of water in daily and 30 minutes of activity at least 5 days/week to help prevent constipation    FOLLOW-UP: Tuesday August 29th at 11:30  Recommend you see Dr. Paulson 1 month from now          Follow-ups after your visit        Your next 10 appointments already scheduled     Aug 29, 2017 11:30 AM CDT   Diabetic Education with BE DIABETIC ED RESOURCE   [facility] ([facility] [place])    [address]  [place] [address] [phone]              Who to contact     If you have questions or need follow up information about today's clinic visit or your schedule please contact [facility] [place] directly at [phone].  Normal or non-critical lab and imaging results will be communicated to you by MyChart, letter or phone within 4 business days after the clinic has received the results. If you do not hear from us within 7 days, please contact the clinic through MyChart or phone. If you have a critical or abnormal lab result, we will notify you by phone as soon as possible.  Submit refill requests through [employer] or call your pharmacy and they will forward the refill request to us. Please allow 3 business days for your refill to be completed.          Additional Information About  Your Visit        [facility] Information     [employer] gives you secure access to your electronic health record. If you see a primary care provider, you can also send messages to your care team and make appointments. If you have questions, please call your primary care clinic.  If you do not have a primary care provider, please call [phone] and they will assist you.        Care EveryWhere ID     This is your Care EveryWhere ID. This could be used by other organizations to access your [facility] medical records  [id number]         Blood Pressure from Last 3 Encounters:   05/08/17 110/72   03/08/17 100/72   02/13/17 108/72    Weight from Last 3 Encounters:   05/08/17 176 lb (79.8 kg)   03/08/17 180 lb (81.6 kg)   02/13/17 177 lb 2 oz (80.3 kg)              Today, you had the following     No orders found for display       Primary Care Provider Office Phone # Fax #    Guy Nugent Khurram, [phone]       [address] [address]        Equal Access to Services     BEKA WEAVER : Hadii aad ku hadasho Soomaali, waaxda luqadaha, qaybta kaalmada aderaphael, triny menjivar. So [facility] [phone].    ATENCIÓN: Si habla español, tiene a aguilar disposición servicios grati4.msos de asistencia lingüística. Christ al [phone].    We comply with applicable federal civil rights laws and [place] laws. We do not discriminate on the basis of race, color, national origin, age, disability sex, sexual orientation or gender identity.            Thank you!     Thank you for choosing [facility]  for your care. Our goal is always to provide you with excellent care. Hearing back from our patients is one way we can continue to improve our services. Please take a few minutes to complete the written survey that you may receive in the mail after your visit with us. Thank you!             Your Updated Medication List - Protect others around  you: Learn how to safely use, store and throw away your medicines at www.disposemymeds.org.          This list is accurate as of: 7/6/17  3:02 PM.  Always use your most recent med list.                   Brand Name Dispense Instructions for use Diagnosis    cholecalciferol 77413 UNITS capsule    VITAMIN D3    10 capsule    Take 1 capsule (50,000 Units) by mouth once a week    Vitamin D deficiency       * ferrous sulfate 325 (65 FE) MG tablet    IRON    60 tablet    Take 1 tablet (325 mg) by mouth 2 times daily    Iron deficiency anemia, unspecified iron deficiency anemia type       * ferrous sulfate 142 (45 FE) MG Tbcr    SLO-FE    90 tablet    Take 1 tablet (142 mg) by mouth daily    Iron deficiency anemia, unspecified iron deficiency anemia type       * Notice:  This list has 2 medication(s) that are the same as other medications prescribed for you. Read the directions carefully, and ask your doctor or other care provider to review them with you.

## 2017-07-06 NOTE — Clinical Note
Hi Dr. Paulson,  I met with Ines today for nutrition education. Discussed ways to increase iron and vitamin D in her diet. She is not taking her iron or Vitamin D supplements but is agreeable to begin tomorrow. Advised her to see you 1 month after starting supplements. She will see me again on 8/29. She plans to try to conceive in the future, do you think she would benefit from overall prenatal supplement?  Thank you! Kathrin Gil RD, LD, CDE

## 2017-07-31 ENCOUNTER — TELEPHONE (OUTPATIENT)
Dept: FAMILY MEDICINE | Facility: CLINIC | Age: 37
End: 2017-07-31

## 2017-07-31 ENCOUNTER — OFFICE VISIT (OUTPATIENT)
Dept: FAMILY MEDICINE | Facility: CLINIC | Age: 37
End: 2017-07-31
Payer: COMMERCIAL

## 2017-07-31 VITALS
DIASTOLIC BLOOD PRESSURE: 68 MMHG | HEIGHT: 63 IN | HEART RATE: 70 BPM | SYSTOLIC BLOOD PRESSURE: 120 MMHG | BODY MASS INDEX: 30.83 KG/M2 | TEMPERATURE: 98.2 F | WEIGHT: 174 LBS

## 2017-07-31 DIAGNOSIS — F43.21 GRIEF REACTION: ICD-10-CM

## 2017-07-31 DIAGNOSIS — D50.9 IRON DEFICIENCY ANEMIA, UNSPECIFIED IRON DEFICIENCY ANEMIA TYPE: ICD-10-CM

## 2017-07-31 DIAGNOSIS — R51.9 THROBBING HEADACHE: ICD-10-CM

## 2017-07-31 DIAGNOSIS — E55.9 VITAMIN D DEFICIENCY: ICD-10-CM

## 2017-07-31 DIAGNOSIS — R53.83 OTHER FATIGUE: Primary | ICD-10-CM

## 2017-07-31 LAB
CRP SERPL-MCNC: <2.9 MG/L (ref 0–8)
ERYTHROCYTE [DISTWIDTH] IN BLOOD BY AUTOMATED COUNT: 20.7 % (ref 10–15)
HCT VFR BLD AUTO: 28.3 % (ref 35–47)
HGB BLD-MCNC: 8.2 G/DL (ref 11.7–15.7)
MCH RBC QN AUTO: 18 PG (ref 26.5–33)
MCHC RBC AUTO-ENTMCNC: 29 G/DL (ref 31.5–36.5)
MCV RBC AUTO: 62 FL (ref 78–100)
PLATELET # BLD AUTO: 299 10E9/L (ref 150–450)
RBC # BLD AUTO: 4.56 10E12/L (ref 3.8–5.2)
WBC # BLD AUTO: 5 10E9/L (ref 4–11)

## 2017-07-31 PROCEDURE — 86140 C-REACTIVE PROTEIN: CPT | Performed by: FAMILY MEDICINE

## 2017-07-31 PROCEDURE — 85027 COMPLETE CBC AUTOMATED: CPT | Performed by: FAMILY MEDICINE

## 2017-07-31 PROCEDURE — 99215 OFFICE O/P EST HI 40 MIN: CPT | Performed by: FAMILY MEDICINE

## 2017-07-31 PROCEDURE — 36415 COLL VENOUS BLD VENIPUNCTURE: CPT | Performed by: FAMILY MEDICINE

## 2017-07-31 PROCEDURE — 80053 COMPREHEN METABOLIC PANEL: CPT | Performed by: FAMILY MEDICINE

## 2017-07-31 PROCEDURE — 84443 ASSAY THYROID STIM HORMONE: CPT | Performed by: FAMILY MEDICINE

## 2017-07-31 RX ORDER — ERGOCALCIFEROL 1.25 MG/1
50000 CAPSULE, LIQUID FILLED ORAL
Qty: 8 CAPSULE | Refills: 0 | Status: SHIPPED | OUTPATIENT
Start: 2017-07-31 | End: 2017-09-19

## 2017-07-31 RX ORDER — SUMATRIPTAN 25 MG/1
25-50 TABLET, FILM COATED ORAL
Qty: 10 TABLET | Refills: 1 | Status: SHIPPED | OUTPATIENT
Start: 2017-07-31 | End: 2017-10-06

## 2017-07-31 NOTE — LETTER
[address] [address] [phone]                                                                                                August 21, 2017    Ines Gaitan  [address]  [place] [address]        Dear Ms. Gaitan,      As discussed your hemoglobin is very low, please follow up in the office in 1-2 weeks to recheck          You may contact the clinic at [phone] if you have any questions or concerns about this request.      Take care,      Guy Paulson, DO/hl    Results for orders placed or performed in visit on 07/31/17   Comprehensive metabolic panel   Result Value Ref Range    Sodium 139 133 - 144 mmol/L    Potassium 3.8 3.4 - 5.3 mmol/L    Chloride 108 94 - 109 mmol/L    Carbon Dioxide 25 20 - 32 mmol/L    Anion Gap 6 3 - 14 mmol/L    Glucose 73 70 - 99 mg/dL    Urea Nitrogen 8 7 - 30 mg/dL    Creatinine 0.79 0.52 - 1.04 mg/dL    GFR Estimate 82 >60 mL/min/1.7m2    GFR Estimate If Black >90   GFR Calc   >60 mL/min/1.7m2    Calcium 8.8 8.5 - 10.1 mg/dL    Bilirubin Total 0.5 0.2 - 1.3 mg/dL    Albumin 3.9 3.4 - 5.0 g/dL    Protein Total 7.8 6.8 - 8.8 g/dL    Alkaline Phosphatase 68 40 - 150 U/L    ALT 23 0 - 50 U/L    AST 21 0 - 45 U/L   TSH with free T4 reflex   Result Value Ref Range    TSH 1.72 0.40 - 4.00 mU/L   CBC with platelets   Result Value Ref Range    WBC 5.0 4.0 - 11.0 10e9/L    RBC Count 4.56 3.8 - 5.2 10e12/L    Hemoglobin 8.2 (L) 11.7 - 15.7 g/dL    Hematocrit 28.3 (L) 35.0 - 47.0 %    MCV 62 (L) 78 - 100 fl    MCH 18.0 (L) 26.5 - 33.0 pg    MCHC 29.0 (L) 31.5 - 36.5 g/dL    RDW 20.7 (H) 10.0 - 15.0 %    Platelet Count 299 150 - 450 10e9/L   CRP, inflammation   Result Value Ref Range    CRP Inflammation <2.9 0.0 - 8.0 mg/L

## 2017-07-31 NOTE — TELEPHONE ENCOUNTER
"Ines Gaitan is a 36 year old female who calls with headache.    NURSING ASSESSMENT:  Description:  \"Left headache- above ear, temple, nerve throbbing intermittently for about 1 hour & then stop for 2 hours & then start again & has lasted about 3 days. No headache since yesterday.   She denies visual changes, nausea or vomiting, fever, or weakness.  Onset/duration:  4 days ago  Precip. factors:  Stress- working a lot, not sleeping enough  Associated symptoms:  none  Improves/worsens symptoms: sleep  Pain scale (0-10)   4/10  Last exam/Treatment:  5/8  Allergies: No Known Allergies    NURSING PLAN: Huddle with provider, plan includes appt today or tomorrow; scheduled in cancellation spot at 2:20.     RECOMMENDED DISPOSITION:    Will comply with recommendation: Yes  If further questions/concerns or if symptoms do not improve, worsen or new symptoms develop, call your PCP or [facility] as soon as possible.    Guideline used:  Telephone Triage Protocols for Nurses, Fifth Edition, Madhavi Mcbride RN      "

## 2017-07-31 NOTE — PATIENT INSTRUCTIONS
Use iron tab daily  Take tylenol for Headache  If it does not improve today, take imitrex(sent to pharmacy)  If having heading in 1-2 days or worsening we need to get head imaging test(CT scan)  recheck labs in 3 weeks other wise  Consider therapy  Vit D supplements  Guy Paulson D.O.

## 2017-07-31 NOTE — TELEPHONE ENCOUNTER
[facility] RN please traige:    ----- Message -----      From: Ines Gaitan      Sent: 7/28/2017 11:42 PM CDT        To: Patient Appointment Schedule Request Mailing List   Subject: Appointment Request     Appointment Request From: Ines Gaitan     With Provider: Guy Paulson DO [[facility]]     Preferred Date Range: From 7/28/2017 To 7/29/2017     Preferred Times: Any     Reason for visit: Request an Appointment     Comments:   Pain in left side of brain

## 2017-07-31 NOTE — NURSING NOTE
"Chief Complaint   Patient presents with     Headache       Initial /68 (BP Location: Right arm, Patient Position: Chair, Cuff Size: Adult Regular)  Pulse 70  Temp 98.2  F (36.8  C) (Oral)  Ht 5' 2.5\" (1.588 m)  Wt 174 lb (78.9 kg)  BMI 31.32 kg/m2 Estimated body mass index is 31.32 kg/(m^2) as calculated from the following:    Height as of this encounter: 5' 2.5\" (1.588 m).    Weight as of this encounter: 174 lb (78.9 kg).  Medication Reconciliation: complete   Flory Rutherford MA      "

## 2017-07-31 NOTE — PROGRESS NOTES
SUBJECTIVE:                                                    Ines Gaitan is a 36 year old female who presents to clinic today for the following health issues:        Headache  Onset: 5 days, gone since yesterday. Difficult to drive when this was happening. He mind felt numb.     Description:   Location: unilateral in the left temporal area   Character: throbbing pain, pulsing, she could feel it and hear it. She reports the headache was not the problem but the throbbing.pulsing  Frequency:  Does not get headaches typically  Duration:  This one last 3-5 days    Intensity: moderate    Progression of Symptoms: gone now    Accompanying Signs & Symptoms:  Stiff neck: no   Neck or upper back pain: no   Fever: no   Sinus pressure: no   Nausea or vomiting: no   Dizziness: YES, a little  Numbness: no   Weakness: no   Visual changes: no     History:   Head trauma: no   Family history of migraines: no   Previous tests for headaches: no   Neurologist evaluations: no    Able to do daily activities: YES, stopped her the first two days, felt like sleeping all the time  Wake with a headaches: no   Do headaches wake you up: no   Daily pain medication use: no   Work/school stressors/changes: no     Precipitating factors:   Does light make it worse: no   Does sound make it worse: no     Alleviating factors:  Does sleep help: no     Therapies Tried and outcome: nothing    Noise sensitivity, no light sensitivity  Lots of stress,   No severe HA, just throbbing pain only left side temporal, no vision changes, no neuro sx  Felt like she could not concentrae or focus on normal stuff    Last 3 days off and on  Throbbing 30 seconds every 2 hours  4/10 pain level  No other sx, no tearing, no sinus issues  No cough, no congestion      She is home sick  Recent miscarriage, sad sometimes, saddness, she wants to pain, but her  and her bought a house, moved and they work 6 days out of the week.  She feels like she has no friends and  "misses the social interactions  She denies suicidal or homicdal ideations      Problem list and histories reviewed & adjusted, as indicated.  Additional history: as documented    Patient Active Problem List   Diagnosis     CARDIOVASCULAR SCREENING; LDL GOAL LESS THAN 160     Chronic constipation     Vitamin D deficiency     Pain in thoracic spine     Subclinical hyperthyroidism     Iron deficiency anemia, unspecified iron deficiency anemia type     History of female infertility     History reviewed. No pertinent surgical history.    Social History   Substance Use Topics     Smoking status: Never Smoker     Smokeless tobacco: Never Used     Alcohol use No     Family History   Problem Relation Age of Onset     Hypertension Mother      DIABETES Father      CEREBROVASCULAR DISEASE Father      Heart Surgery Father      Thyroid Disease Sister      CANCER Maternal Grandmother      liver     Macular Degeneration No family hx of      Glaucoma No family hx of              Reviewed and updated as needed this visit by clinical staffTobacco  Allergies  Meds  Med Hx  Surg Hx  Fam Hx  Soc Hx      Reviewed and updated as needed this visit by Provider         ROS:  Constitutional, HEENT, cardiovascular, pulmonary, GI, , musculoskeletal, neuro, skin, endocrine and psych systems are negative, except as otherwise noted.      OBJECTIVE:   /68 (BP Location: Right arm, Patient Position: Chair, Cuff Size: Adult Regular)  Pulse 70  Temp 98.2  F (36.8  C) (Oral)  Ht 5' 2.5\" (1.588 m)  Wt 174 lb (78.9 kg)  BMI 31.32 kg/m2  Body mass index is 31.32 kg/(m^2).  GENERAL: healthy, alert and no distress  EYES: Eyes grossly normal to inspection, PERRL and conjunctivae and sclerae normal  HENT: ear canals and TM's normal, nose and mouth without ulcers or lesions  NECK: no adenopathy, no asymmetry, masses, or scars and thyroid normal to palpation  RESP: lungs clear to auscultation - no rales, rhonchi or wheezes  CV: regular rate and " rhythm, normal S1 S2, no S3 or S4, no murmur, click or rub, no peripheral edema and peripheral pulses strong  ABDOMEN: soft, nontender, no hepatosplenomegaly, no masses and bowel sounds normal  MS: no gross musculoskeletal defects noted, no edema    Diagnostic Test Results:  Results for orders placed or performed in visit on 07/31/17   CBC with platelets   Result Value Ref Range    WBC 5.0 4.0 - 11.0 10e9/L    RBC Count 4.56 3.8 - 5.2 10e12/L    Hemoglobin 8.2 (L) 11.7 - 15.7 g/dL    Hematocrit 28.3 (L) 35.0 - 47.0 %    MCV 62 (L) 78 - 100 fl    MCH 18.0 (L) 26.5 - 33.0 pg    MCHC 29.0 (L) 31.5 - 36.5 g/dL    RDW 20.7 (H) 10.0 - 15.0 %    Platelet Count 299 150 - 450 10e9/L         ASSESSMENT/PLAN:       ICD-10-CM    1. Other fatigue R53.83 Comprehensive metabolic panel     TSH with free T4 reflex     CBC with platelets     CRP, inflammation   2. Throbbing headache R51 Comprehensive metabolic panel     TSH with free T4 reflex     CBC with platelets     CRP, inflammation     SUMAtriptan (IMITREX) 25 MG tablet   3. Iron deficiency anemia, unspecified iron deficiency anemia type D50.9 ferrous sulfate (SLO-FE) 142 (45 FE) MG TBCR   4. Vitamin D deficiency E55.9 vitamin D (ERGOCALCIFEROL) 10360 UNIT capsule   5. Grief reaction F43.20        Fatigue -chronic , most likey due to vit d and iron def anemia, has in the past been non compliant with meds.  Patient is a vegan and does not consume enough iron in her diet  Strongly advised iron supplements, or IV infusion, she declined for now, will try oral and recheck in 4 weeks  Headache-sounds like migraine-trial of tylenol, check labs, follow up in 2-3 days if not resolving  Grief/depression-stongly advised therapy, declined for now, wants to go back to [facility]an and thinks it will help her more than anything  Feels safe at home.   Spent greater than 45min with  50% of counseling and coordination of care for the conditions documented above.        See Patient  Instructions    Guy Paulson, [facility]

## 2017-07-31 NOTE — MR AVS SNAPSHOT
After Visit Summary   7/31/2017    Ines Gaitan    MRN: [id number]           Patient Information     Date Of Birth          1980        Visit Information        Provider Department      7/31/2017 2:20 PM Guy Paulson DO [facility]        Today's Diagnoses     Other fatigue    -  1    Throbbing headache          Care Instructions    Use iron tab daily  Take tylenol for Headache  If it does not improve today, take imitrex(sent to pharmacy)  If having heading in 1-2 days or worsening we need to get head imaging test(CT scan)  recheck labs in 3 weeks other wise  Consider therapy  Vit D supplements  Guy Paulson D.OGraciela            Follow-ups after your visit        Your next 10 appointments already scheduled     Aug 29, 2017 11:30 AM CDT   Diabetic Education with BE DIABETIC ED RESOURCE   [facility] [place] ([facility])    [address]  [place] [address] [phone]              Who to contact     If you have questions or need follow up information about today's clinic visit or your schedule please contact [facility] directly at [phone].  Normal or non-critical lab and imaging results will be communicated to you by MyChart, letter or phone within 4 business days after the clinic has received the results. If you do not hear from us within 7 days, please contact the clinic through [employer] or phone. If you have a critical or abnormal lab result, we will notify you by phone as soon as possible.  Submit refill requests through [employer] or call your pharmacy and they will forward the refill request to us. Please allow 3 business days for your refill to be completed.          Additional Information About Your Visit        MyChart Information     [employer] gives you secure access to your electronic health record. If you see a primary care provider, you can also send messages to your care team and make appointments. If  "you have questions, please call your primary care clinic.  If you do not have a primary care provider, please call [phone] and they will assist you.        Care EveryWhere ID     This is your Care EveryWhere ID. This could be used by other organizations to access your [place] medical records  [id number]        Your Vitals Were     Pulse Temperature Height BMI (Body Mass Index)          70 98.2  F (36.8  C) (Oral) 5' 2.5\" (1.588 m) 31.32 kg/m2         Blood Pressure from Last 3 Encounters:   07/31/17 120/68   05/08/17 110/72   03/08/17 100/72    Weight from Last 3 Encounters:   07/31/17 174 lb (78.9 kg)   07/06/17 178 lb (80.7 kg)   05/08/17 176 lb (79.8 kg)              We Performed the Following     CBC with platelets     Comprehensive metabolic panel     CRP, inflammation     TSH with free T4 reflex          Today's Medication Changes          These changes are accurate as of: 7/31/17  3:30 PM.  If you have any questions, ask your nurse or doctor.               Start taking these medicines.        Dose/Directions    SUMAtriptan 25 MG tablet   Commonly known as:  IMITREX   Used for:  Throbbing headache   Started by:  Guy Paulson DO        Dose:  25-50 mg   Take 1-2 tablets (25-50 mg) by mouth at onset of headache for migraine May repeat in 2 hours. Max 8 tablets/24 hours.   Quantity:  10 tablet   Refills:  1            Where to get your medicines      These medications were sent to [facility] - [address]  [address] [address]     Phone:  [phone]     SUMAtriptan 25 MG tablet                Primary Care Provider Office Phone # Fax #    Guy Paulson [phone]       [address] [address]        Equal Access to Services     BEKA WEAVER AH: Hadii aad ku hadasho Soomaali, waaxda luqadaha, qaybta kaalmada sharee, triny menjivar. " So [facility] [phone].    ATENCIÓN: Si arianne emerson, tiene a aguilar disposición servicios gratuitos de asistencia lingüística. Christ perez [phone].    We comply with applicable federal civil rights laws and [place] laws. We do not discriminate on the basis of race, color, national origin, age, disability sex, sexual orientation or gender identity.            Thank you!     Thank you for choosing [facility]  for your care. Our goal is always to provide you with excellent care. Hearing back from our patients is one way we can continue to improve our services. Please take a few minutes to complete the written survey that you may receive in the mail after your visit with us. Thank you!             Your Updated Medication List - Protect others around you: Learn how to safely use, store and throw away your medicines at www.disposemymeds.org.          This list is accurate as of: 7/31/17  3:30 PM.  Always use your most recent med list.                   Brand Name Dispense Instructions for use Diagnosis    cholecalciferol 89105 UNITS capsule    VITAMIN D3    10 capsule    Take 1 capsule (50,000 Units) by mouth once a week    Vitamin D deficiency       * ferrous sulfate 325 (65 FE) MG tablet    IRON    60 tablet    Take 1 tablet (325 mg) by mouth 2 times daily    Iron deficiency anemia, unspecified iron deficiency anemia type       * ferrous sulfate 142 (45 FE) MG Tbcr    SLO-FE    90 tablet    Take 1 tablet (142 mg) by mouth daily    Iron deficiency anemia, unspecified iron deficiency anemia type       SUMAtriptan 25 MG tablet    IMITREX    10 tablet    Take 1-2 tablets (25-50 mg) by mouth at onset of headache for migraine May repeat in 2 hours. Max 8 tablets/24 hours.    Throbbing headache       * Notice:  This list has 2 medication(s) that are the same as other medications prescribed for you. Read the directions carefully, and ask your doctor or other care provider to review them with you.

## 2017-08-01 LAB
ALBUMIN SERPL-MCNC: 3.9 G/DL (ref 3.4–5)
ALP SERPL-CCNC: 68 U/L (ref 40–150)
ALT SERPL W P-5'-P-CCNC: 23 U/L (ref 0–50)
ANION GAP SERPL CALCULATED.3IONS-SCNC: 6 MMOL/L (ref 3–14)
AST SERPL W P-5'-P-CCNC: 21 U/L (ref 0–45)
BILIRUB SERPL-MCNC: 0.5 MG/DL (ref 0.2–1.3)
BUN SERPL-MCNC: 8 MG/DL (ref 7–30)
CALCIUM SERPL-MCNC: 8.8 MG/DL (ref 8.5–10.1)
CHLORIDE SERPL-SCNC: 108 MMOL/L (ref 94–109)
CO2 SERPL-SCNC: 25 MMOL/L (ref 20–32)
CREAT SERPL-MCNC: 0.79 MG/DL (ref 0.52–1.04)
GFR SERPL CREATININE-BSD FRML MDRD: 82 ML/MIN/1.7M2
GLUCOSE SERPL-MCNC: 73 MG/DL (ref 70–99)
POTASSIUM SERPL-SCNC: 3.8 MMOL/L (ref 3.4–5.3)
PROT SERPL-MCNC: 7.8 G/DL (ref 6.8–8.8)
SODIUM SERPL-SCNC: 139 MMOL/L (ref 133–144)
TSH SERPL DL<=0.005 MIU/L-ACNC: 1.72 MU/L (ref 0.4–4)

## 2017-10-05 ENCOUNTER — OFFICE VISIT (OUTPATIENT)
Dept: NUTRITION | Facility: CLINIC | Age: 37
End: 2017-10-05
Payer: COMMERCIAL

## 2017-10-05 VITALS — WEIGHT: 173.8 LBS | BODY MASS INDEX: 31.28 KG/M2

## 2017-10-05 DIAGNOSIS — Z78.9 VEGETARIAN DIET: ICD-10-CM

## 2017-10-05 DIAGNOSIS — D50.9 IRON DEFICIENCY ANEMIA, UNSPECIFIED IRON DEFICIENCY ANEMIA TYPE: Primary | ICD-10-CM

## 2017-10-05 DIAGNOSIS — E55.9 VITAMIN D DEFICIENCY: ICD-10-CM

## 2017-10-05 PROCEDURE — 97803 MED NUTRITION INDIV SUBSEQ: CPT

## 2017-10-05 NOTE — PATIENT INSTRUCTIONS
Include a protein food at each meal- Tofu, Paneer, beans (chickpeas, black beans etc.), yogurt 2 cups at each meal      Breakfast- At breakfast table keep pill container with all vitamins to take daily (Iron, pre-dangelo, and vitamin D), 2 cups yogurt drink     Protein per meal- aim for 20g per meal    Carbohydrates- lentils, beans, rice, bread, fruit etc. 30g per meal or 2 servings, 15g or 1 serving per snack       Follow Up- Schedule with Evgeny in 4-6 weeks.

## 2017-10-05 NOTE — Clinical Note
Pt now taking Iron and feeling much better, even started exercising.  Recommended labs follow up with you.  Rina Montilla MS, RD, LD, CDE

## 2017-10-05 NOTE — MR AVS SNAPSHOT
After Visit Summary   10/5/2017    Ines Gaitan    MRN: [id number]           Patient Information     Date Of Birth          1980        Visit Information        Provider Department      10/5/2017 2:00 PM  NUTRITION RESOURCE [facility] [place]        Care Instructions    Include a protein food at each meal- Tofu, Paneer, beans (chickpeas, black beans etc.), yogurt 2 cups at each meal      Breakfast- At breakfast table keep pill container with all vitamins to take daily (Iron, pre-, and vitamin D), 2 cups yogurt drink     Protein per meal- aim for 20g per meal    Carbohydrates- lentils, beans, rice, bread, fruit etc. 30g per meal or 2 servings, 15g or 1 serving per snack       Follow Up- Schedule with Evgeny in 4-6 weeks.              Follow-ups after your visit        Your next 10 appointments already scheduled     2017  2:00 PM CST   Diabetic Education with NANCY NUTRITION [facility] [place] ([facility])    [address]  [place] [address] [phone]              Who to contact     If you have questions or need follow up information about today's clinic visit or your schedule please contact [facility] directly at [phone].  Normal or non-critical lab and imaging results will be communicated to you by NovoPolymershart, letter or phone within 4 business days after the clinic has received the results. If you do not hear from us within 7 days, please contact the clinic through NovoPol[employer]hart or phone. If you have a critical or abnormal lab result, we will notify you by phone as soon as possible.  Submit refill requests through [employer] or call your pharmacy and they will forward the refill request to us. Please allow 3 business days for your refill to be completed.          Additional Information About Your Visit        [employer] Information     [employer] gives you secure access to your electronic health record. If you see a primary care  provider, you can also send messages to your care team and make appointments. If you have questions, please call your primary care clinic.  If you do not have a primary care provider, please call [phone] and they will assist you.        Care EveryWhere ID     This is your Care EveryWhere ID. This could be used by other organizations to access your [facility] medical records  [id number]        Your Vitals Were     BMI (Body Mass Index)                   31.28 kg/m2            Blood Pressure from Last 3 Encounters:   07/31/17 120/68   05/08/17 110/72   03/08/17 100/72    Weight from Last 3 Encounters:   10/05/17 173 lb 12.8 oz (78.8 kg)   07/31/17 174 lb (78.9 kg)   07/06/17 178 lb (80.7 kg)              Today, you had the following     No orders found for display       Primary Care Provider Office Phone # Fax #    Guy Paulson [phone]       [facility] [address]        Equal Access to Services     [facility] : Hadii aad ku hadasho Soomaali, waaxda luqadaha, qaybta kaalmada adeegyada, waxscott garcia hayeder kaplan . So [facility] [phone].    ATENCIÓN: Si habla español, tiene a aguilar disposición servicios gratuitos de asistencia lingüística. Llame al [phone].    We comply with applicable federal civil rights laws and [place] laws. We do not discriminate on the basis of race, color, national origin, age, disability, sex, sexual orientation, or gender identity.            Thank you!     Thank you for choosing [facility]  for your care. Our goal is always to provide you with excellent care. Hearing back from our patients is one way we can continue to improve our services. Please take a few minutes to complete the written survey that you may receive in the mail after your visit with us. Thank you!             Your Updated Medication List - Protect others around you: Learn how to safely use, store and throw away your medicines at  www.disposemymeds.org.          This list is accurate as of: 10/5/17  3:00 PM.  Always use your most recent med list.                   Brand Name Dispense Instructions for use Diagnosis    cholecalciferol 83253 UNITS capsule    VITAMIN D3    10 capsule    Take 1 capsule (50,000 Units) by mouth once a week    Vitamin D deficiency       * ferrous sulfate 325 (65 FE) MG tablet    IRON    60 tablet    Take 1 tablet (325 mg) by mouth 2 times daily    Iron deficiency anemia, unspecified iron deficiency anemia type       * ferrous sulfate 142 (45 FE) MG Tbcr    SLO-FE    90 tablet    Take 1 tablet (142 mg) by mouth daily    Iron deficiency anemia, unspecified iron deficiency anemia type       SUMAtriptan 25 MG tablet    IMITREX    10 tablet    Take 1-2 tablets (25-50 mg) by mouth at onset of headache for migraine May repeat in 2 hours. Max 8 tablets/24 hours.    Throbbing headache       * Notice:  This list has 2 medication(s) that are the same as other medications prescribed for you. Read the directions carefully, and ask your doctor or other care provider to review them with you.

## 2017-10-05 NOTE — PROGRESS NOTES
Medical Nutrition Therapy  Visit Type:Reassessment and intervention    Ines Gaitan presents today for MNT and education related to weight management, desires pregnancy, low vitamin D, and anemia.   She is accompanied by self.     ASSESSMENT:   Patient comments/concerns relating to nutrition: would like to lose weight and have more guidance on portion sizes of foods she should be eating.  Reports that since her last visit she is eating more green vegetables and feels much better.  Started taking slow iron consistently last 4 weeks (missed supplement last week), frequently misses prenatal and vitamin D.  Notes before taking iron supplement she had a hard time getting out of bed, and how she can get out of bed.  Recently added 1.5 hours of exercise daily but reports feeling out of breath with jogging.  Patient would benefit from consistent supplementation daily, and would recommend taking same time each day even though there will likely be a slight decrease in absorption when taken together and with food. Risk of continued missed doses of supplements is worse than perfect timing for maximized absorption.    Plans to go to [facility] for 2.5 months from November-February.    NUTRITION HISTORY:    Breakfast: 2 pc toast, cucumber, tomato OR oatmeal, spinach, broccoli, carrots, asparagus (4-5 times per month 2 eggs)  Lunch: 2 cups Lentil soup (Daal), bread OR Paneer (started eating this meal after last RD visit)  Dinner: lentil, bread, yogurt, fruit   Snacks: chips  Beverages: Pop (Regular) half can /day and Water throughout day, reports significantly cutting back soda intake    Proteins- yogurt, beans, chickpeas, tofu (1 time per week),     Dairy- drinks homemade yogurt drink which is of thin consistency  Misses meals? no  Eats out:  Frequently- works at restaurant     Previous diet education:  Yes     Food allergies/intolerances: Vegetarian    Diet is high in: carbs, fiber, fruits and vegetables  Diet is low in: calcium and  protein    EXERCISE: Regular- Started walking and jogging recently- 45 minutes walk/run and 45 minutes yoga.  Feels out of breath when jogging.    SOCIO/ECONOMIC:   Lives with: spouse    MEDICATIONS:  Current Outpatient Prescriptions   Medication     SUMAtriptan (IMITREX) 25 MG tablet     ferrous sulfate (SLO-FE) 142 (45 FE) MG TBCR     ferrous sulfate (IRON) 325 (65 FE) MG tablet     cholecalciferol (VITAMIN D3) 14901 UNITS capsule     No current facility-administered medications for this visit.        LABS:  Last Basic Metabolic Panel:  Lab Results   Component Value Date     07/31/2017      Lab Results   Component Value Date    POTASSIUM 3.8 07/31/2017     Lab Results   Component Value Date    CHLORIDE 108 07/31/2017     Lab Results   Component Value Date    WICHO 8.8 07/31/2017     Lab Results   Component Value Date    CO2 25 07/31/2017     Lab Results   Component Value Date    BUN 8 07/31/2017     Lab Results   Component Value Date    CR 0.79 07/31/2017     Lab Results   Component Value Date    GLC 73 07/31/2017       ANTHROPOMETRICS:  Vitals: Wt 173 lb 12.8 oz (78.8 kg)  BMI 31.28 kg/m2  There is no height or weight on file to calculate BMI.      Wt Readings from Last 5 Encounters:   07/31/17 174 lb (78.9 kg)   07/06/17 178 lb (80.7 kg)   05/08/17 176 lb (79.8 kg)   03/08/17 180 lb (81.6 kg)   02/13/17 177 lb 2 oz (80.3 kg)       Weight Change: Down 1 lb in 3 months.  Wt stable with increased food and activity.    ESTIMATED KCAL REQUIREMENTS:  2903-0739 kcal per day to promote weight loss   Carbohydrates- 130g/ 9 carb servings (2-3 per meal, 1 per snack)  Protein- 63g per day (about 20g meal)   Fat-28-32g per day    NUTRITION DIAGNOSIS: Altered nutrition-related laboratory values (hemoglobin) related to insufficient iron intake 2/2 vegetarian diet as evidenced by hemoglobin value of 8.4g/dL     NUTRITION INTERVENTION:  Nutrition Prescription: Carbohydrate Intake: 30-45 grams/meal and 15 grams/snacks.  Used   foods serving size education handout to discuss estimated portions per meal and how to balance carb foods with protein foods that contain carbs.  Protein Intake: 63 grams/day- Recommended aiming for a minimum of 20g per meal. Used Nutrition care manual Iron content of foods handout to discuss examples of portion sizes and foods to have with each meal.  Pt agreeable to 2 cups yogurt drink with breakfast, and swapping 1 portion of lentils at lunch and dinner for beans or tofu.    Motivational Interviewing    Educated patient about impact of iron deficiency on exercise and that at this point it is hard to determine difference between being out of shape vs. Limited blood oxygenation due to anemia.  Discussed that consistent supplementation will bring iron levels up, but she will likely need a maintenance dose to continue to see benefit.    Educated patient about interaction and decreased absorption of iron when taken with food, but there is greater risk to consistently missing doses vs. Attempting to take separately.  Discussed strategies to taking regular supplements.  PATIENT'S BEHAVIOR CHANGE GOALS:   Include a protein food at each meal- Tofu, Paneer, beans (chickpeas, black beans etc.), yogurt 2 cups at each meal  Breakfast- At breakfast table keep pill container with all vitamins to take daily (Iron, pre-dangelo, and vitamin D), 2 cups yogurt drink   Protein per meal- aim for 20g per meal  Carbohydrates- lentils, beans, rice, bread, fruit etc. 30g per meal or 2 servings, 15g or 1 serving per snack     Follow Up- Schedule with Evgeny in 4-6 weeks to recheck labs.    AVS was printed and given to patient at today's appointment.      MONITOR / EVALUATE:  RD will monitor/evaluate:  Food / Beverage / Nutrient intake   Pertinent Labs  Progress toward meeting stated nutrition-related goals  Weight change    FOLLOW-UP:  Follow-up appointment scheduled on 17.     Rina Montilla MS, RD, LD, CDE    Time spent in  minutes: 45  Encounter: Individual

## 2017-10-06 ENCOUNTER — OFFICE VISIT (OUTPATIENT)
Dept: FAMILY MEDICINE | Facility: CLINIC | Age: 37
End: 2017-10-06
Payer: COMMERCIAL

## 2017-10-06 VITALS
HEART RATE: 86 BPM | SYSTOLIC BLOOD PRESSURE: 117 MMHG | WEIGHT: 175 LBS | TEMPERATURE: 98.1 F | DIASTOLIC BLOOD PRESSURE: 83 MMHG | BODY MASS INDEX: 31.5 KG/M2 | OXYGEN SATURATION: 99 %

## 2017-10-06 DIAGNOSIS — Z23 NEED FOR PROPHYLACTIC VACCINATION AND INOCULATION AGAINST INFLUENZA: ICD-10-CM

## 2017-10-06 DIAGNOSIS — N30.01 ACUTE CYSTITIS WITH HEMATURIA: Primary | ICD-10-CM

## 2017-10-06 DIAGNOSIS — R30.0 BURNING WITH URINATION: ICD-10-CM

## 2017-10-06 LAB
ALBUMIN UR-MCNC: 30 MG/DL
APPEARANCE UR: ABNORMAL
BACTERIA #/AREA URNS HPF: ABNORMAL /HPF
BILIRUB UR QL STRIP: NEGATIVE
COLOR UR AUTO: YELLOW
GLUCOSE UR STRIP-MCNC: NEGATIVE MG/DL
HGB UR QL STRIP: ABNORMAL
KETONES UR STRIP-MCNC: NEGATIVE MG/DL
LEUKOCYTE ESTERASE UR QL STRIP: ABNORMAL
NITRATE UR QL: NEGATIVE
NON-SQ EPI CELLS #/AREA URNS LPF: ABNORMAL /LPF
PH UR STRIP: 7.5 PH (ref 5–7)
RBC #/AREA URNS AUTO: ABNORMAL /HPF
SOURCE: ABNORMAL
SP GR UR STRIP: 1.02 (ref 1–1.03)
UROBILINOGEN UR STRIP-ACNC: 0.2 EU/DL (ref 0.2–1)
WBC #/AREA URNS AUTO: ABNORMAL /HPF

## 2017-10-06 PROCEDURE — 81001 URINALYSIS AUTO W/SCOPE: CPT | Performed by: NURSE PRACTITIONER

## 2017-10-06 PROCEDURE — 99213 OFFICE O/P EST LOW 20 MIN: CPT | Mod: 25 | Performed by: NURSE PRACTITIONER

## 2017-10-06 PROCEDURE — 90686 IIV4 VACC NO PRSV 0.5 ML IM: CPT | Performed by: NURSE PRACTITIONER

## 2017-10-06 PROCEDURE — 87088 URINE BACTERIA CULTURE: CPT | Performed by: NURSE PRACTITIONER

## 2017-10-06 PROCEDURE — 87086 URINE CULTURE/COLONY COUNT: CPT | Performed by: NURSE PRACTITIONER

## 2017-10-06 PROCEDURE — 87186 SC STD MICRODIL/AGAR DIL: CPT | Performed by: NURSE PRACTITIONER

## 2017-10-06 PROCEDURE — 90471 IMMUNIZATION ADMIN: CPT | Performed by: NURSE PRACTITIONER

## 2017-10-06 RX ORDER — PHENAZOPYRIDINE HYDROCHLORIDE 200 MG/1
200 TABLET, FILM COATED ORAL 3 TIMES DAILY PRN
Qty: 6 TABLET | Refills: 0 | Status: SHIPPED | OUTPATIENT
Start: 2017-10-06 | End: 2018-09-17

## 2017-10-06 RX ORDER — SULFAMETHOXAZOLE/TRIMETHOPRIM 800-160 MG
1 TABLET ORAL 2 TIMES DAILY
Qty: 6 TABLET | Refills: 0 | Status: SHIPPED | OUTPATIENT
Start: 2017-10-06 | End: 2017-10-09

## 2017-10-06 NOTE — MR AVS SNAPSHOT
After Visit Summary   10/6/2017    Ines Gaitan    MRN: [id number]           Patient Information     Date Of Birth          1980        Visit Information        Provider Department      10/6/2017 9:40 AM Jennifer Alcocer NP [facility]        Today's Diagnoses     Urinary symptom or sign    -  1    Nonspecific finding on examination of urine        Acute cystitis with hematuria        Burning with urination          Care Instructions      Understanding Urinary Tract Infections (UTIs)  Most UTIs are caused by bacteria, although they may also be caused by viruses or fungi. Bacteria from the bowel are the most common source of infection. The infection may start because of any of the following:    Sexual activity. During sex, bacteria can travel from the penis, vagina, or rectum into the urethra.     Bacteria on the skin outside the rectum may travel into the urethra. This is more common in women since the rectum and urethra are closer to each other than in men. Wiping from front to back after using the toilet and keeping the area clean can help prevent germs from getting to the urethra.    Blockage of urine flow through the urinary tract. If urine sits too long, germs may start to grow out of control.      Parts of the urinary tract  The infection can occur in any part of the urinary tract.    The kidneys collect and store urine.    The ureters carry urine from the kidneys to the bladder.    The bladder holds urine until you are ready to let it out.    The urethra carries urine from the bladder out of the body. It is shorter in women, so bacteria can move through it more easily. The urethra is longer in men, so a UTI is less likely to reach the bladder or kidneys in men.  Date Last Reviewed: 1/1/2017 2000-2017 [employer]. [address] [place] [address], [place] [address]. All rights reserved. This information is not intended as a substitute for professional medical care. Always  follow your healthcare professional's instructions.                Follow-ups after your visit        Follow-up notes from your care team     Return if symptoms worsen or fail to improve.      Your next 10 appointments already scheduled     Nov 17, 2017  2:00 PM CST   Diabetic Education with BE [facility]   [facility] [place] ([facility] [place])    [address]  [place] [address] [phone]              Who to contact     Normal or non-critical lab and imaging results will be communicated to you by Correlorhart, letter or phone within 4 business days after the clinic has received the results. If you do not hear from us within 7 days, please contact the clinic through [employer] or phone. If you have a critical or abnormal lab result, we will notify you by phone as soon as possible.  Submit refill requests through [employer] or call your pharmacy and they will forward the refill request to us. Please allow 3 business days for your refill to be completed.          If you need to speak with a  for additional information , please call: [phone]             Additional Information About Your Visit        [employer] Information     [employer] gives you secure access to your electronic health record. If you see a primary care provider, you can also send messages to your care team and make appointments. If you have questions, please call your primary care clinic.  If you do not have a primary care provider, please call [phone] and they will assist you.        Care EveryWhere ID     This is your Care EveryWhere ID. This could be used by other organizations to access your [facility] medical records  [id number]        Your Vitals Were     Pulse Temperature Last Period Pulse Oximetry BMI (Body Mass Index)       86 98.1  F (36.7  C) (Oral) 09/17/2017 99% 31.5 kg/m2        Blood Pressure from Last 3 Encounters:   10/06/17 117/83   07/31/17 120/68   05/08/17 110/72    Weight from  Last 3 Encounters:   10/06/17 175 lb (79.4 kg)   10/05/17 173 lb 12.8 oz (78.8 kg)   07/31/17 174 lb (78.9 kg)              We Performed the Following     UA reflex to Microscopic and Culture     Urine Culture Aerobic Bacterial     Urine Microscopic          Today's Medication Changes          These changes are accurate as of: 10/6/17 10:03 AM.  If you have any questions, ask your nurse or doctor.               Start taking these medicines.        Dose/Directions    phenazopyridine 200 MG tablet   Commonly known as:  PYRIDIUM   Used for:  Burning with urination   Started by:  Jennifer Alcocer NP        Dose:  200 mg   Take 1 tablet (200 mg) by mouth 3 times daily as needed for irritation   Quantity:  6 tablet   Refills:  0       sulfamethoxazole-trimethoprim 800-160 MG per tablet   Commonly known as:  BACTRIM DS/SEPTRA DS   Used for:  Acute cystitis with hematuria   Started by:  Jennifer Alcocer NP        Dose:  1 tablet   Take 1 tablet by mouth 2 times daily for 3 days   Quantity:  6 tablet   Refills:  0            Where to get your medicines      These medications were sent to [facility] [place] - [address]  [address] [facility] [address] [address]     Phone:  [phone]     phenazopyridine 200 MG tablet    sulfamethoxazole-trimethoprim 800-160 MG per tablet                Primary Care Provider Office Phone # Fax Daniel Paulson [phone]       [facility] [facility] [address]        Equal Access to Services     BEKA WEAVER AH: Hadii kyree ku hadasho Soomaali, waaxda luqadaha, qaybta kaalmada adeegyada, triny menjivar. So [facility] [phone].    ATENCIÓN: Si habla idania, tiene a aguilar disposición servicios gratuitos de asistencia lingüística. Llame al [phone].    We comply with applicable federal civil rights laws and [place] laws. We do not discriminate on the basis of race, color, national origin, age,  disability, sex, sexual orientation, or gender identity.            Thank you!     Thank you for choosing [facility]  for your care. Our goal is always to provide you with excellent care. Hearing back from our patients is one way we can continue to improve our services. Please take a few minutes to complete the written survey that you may receive in the mail after your visit with us. Thank you!             Your Updated Medication List - Protect others around you: Learn how to safely use, store and throw away your medicines at www.disposemymeds.org.          This list is accurate as of: 10/6/17 10:03 AM.  Always use your most recent med list.                   Brand Name Dispense Instructions for use Diagnosis    cholecalciferol 09798 UNITS capsule    VITAMIN D3    10 capsule    Take 1 capsule (50,000 Units) by mouth once a week    Vitamin D deficiency       ferrous sulfate 142 (45 FE) MG Tbcr    SLO-FE    90 tablet    Take 1 tablet (142 mg) by mouth daily    Iron deficiency anemia, unspecified iron deficiency anemia type       phenazopyridine 200 MG tablet    PYRIDIUM    6 tablet    Take 1 tablet (200 mg) by mouth 3 times daily as needed for irritation    Burning with urination       PRENATAL 1 PO           sulfamethoxazole-trimethoprim 800-160 MG per tablet    BACTRIM DS/SEPTRA DS    6 tablet    Take 1 tablet by mouth 2 times daily for 3 days    Acute cystitis with hematuria

## 2017-10-06 NOTE — NURSING NOTE
"Chief Complaint   Patient presents with     UTI       Initial /83  Pulse 86  Temp 98.1  F (36.7  C) (Oral)  Wt 175 lb (79.4 kg)  SpO2 99%  BMI 31.5 kg/m2 Estimated body mass index is 31.5 kg/(m^2) as calculated from the following:    Height as of 7/31/17: 5' 2.5\" (1.588 m).    Weight as of this encounter: 175 lb (79.4 kg).  Medication Reconciliation: complete     Romy Sterling MA  "

## 2017-10-06 NOTE — PATIENT INSTRUCTIONS
Understanding Urinary Tract Infections (UTIs)  Most UTIs are caused by bacteria, although they may also be caused by viruses or fungi. Bacteria from the bowel are the most common source of infection. The infection may start because of any of the following:    Sexual activity. During sex, bacteria can travel from the penis, vagina, or rectum into the urethra.     Bacteria on the skin outside the rectum may travel into the urethra. This is more common in women since the rectum and urethra are closer to each other than in men. Wiping from front to back after using the toilet and keeping the area clean can help prevent germs from getting to the urethra.    Blockage of urine flow through the urinary tract. If urine sits too long, germs may start to grow out of control.      Parts of the urinary tract  The infection can occur in any part of the urinary tract.    The kidneys collect and store urine.    The ureters carry urine from the kidneys to the bladder.    The bladder holds urine until you are ready to let it out.    The urethra carries urine from the bladder out of the body. It is shorter in women, so bacteria can move through it more easily. The urethra is longer in men, so a UTI is less likely to reach the bladder or kidneys in men.  Date Last Reviewed: 1/1/2017 2000-2017 [employer]. [address] [place] [address], [place] [address]. All rights reserved. This information is not intended as a substitute for professional medical care. Always follow your healthcare professional's instructions.

## 2017-10-06 NOTE — PROGRESS NOTES
SUBJECTIVE:   Ines Gaitan is a 36 year old female who presents to clinic today for the following health issues:      Urinary SYMPTOMS     Onset: yesterday    Description:   Painful urination (Dysuria): YES  Blood in urine (Hematuria): YES  Frequency/Urgency: YES  Abdominal/Pelvic/Flank Pain : no     Other vaginal symptoms: vaginal discharge and abnormal vaginal bleeding    Progression of Symptoms:  same    History:   History of frequent UTI's: no   History of kidney stones: no   Sexually Active: YES  New Partner: no     Possibility of pregnancy: No     Therapies Tried and outcome: none       Problem list and histories reviewed & adjusted, as indicated.  Additional history: as documented    Patient Active Problem List   Diagnosis     CARDIOVASCULAR SCREENING; LDL GOAL LESS THAN 160     Chronic constipation     Vitamin D deficiency     Pain in thoracic spine     Subclinical hyperthyroidism     Iron deficiency anemia, unspecified iron deficiency anemia type     History of female infertility     History reviewed. No pertinent surgical history.    Social History   Substance Use Topics     Smoking status: Never Smoker     Smokeless tobacco: Never Used     Alcohol use No     Family History   Problem Relation Age of Onset     Hypertension Mother      DIABETES Father      CEREBROVASCULAR DISEASE Father      Heart Surgery Father      Thyroid Disease Sister      CANCER Maternal Grandmother      liver     Macular Degeneration No family hx of      Glaucoma No family hx of          Current Outpatient Prescriptions   Medication Sig Dispense Refill     Prenatal MV-Min-Fe Fum-FA-DHA (PRENATAL 1 PO)        sulfamethoxazole-trimethoprim (BACTRIM DS/SEPTRA DS) 800-160 MG per tablet Take 1 tablet by mouth 2 times daily for 3 days 6 tablet 0     phenazopyridine (PYRIDIUM) 200 MG tablet Take 1 tablet (200 mg) by mouth 3 times daily as needed for irritation 6 tablet 0     ferrous sulfate (SLO-FE) 142 (45 FE) MG TBCR Take 1 tablet (142  mg) by mouth daily 90 tablet 11     cholecalciferol (VITAMIN D3) 01474 UNITS capsule Take 1 capsule (50,000 Units) by mouth once a week 10 capsule 3     No Known Allergies  BP Readings from Last 3 Encounters:   10/06/17 117/83   07/31/17 120/68   05/08/17 110/72    Wt Readings from Last 3 Encounters:   10/06/17 175 lb (79.4 kg)   10/05/17 173 lb 12.8 oz (78.8 kg)   07/31/17 174 lb (78.9 kg)                  Labs reviewed in EPIC        Reviewed and updated as needed this visit by clinical staffTobacco  Allergies  Meds  Med Hx  Surg Hx  Fam Hx  Soc Hx      Reviewed and updated as needed this visit by Provider         ROS:  Constitutional, HEENT, cardiovascular, pulmonary, GI, , musculoskeletal, neuro, skin, endocrine and psych systems are negative, except as otherwise noted.      OBJECTIVE:   /83  Pulse 86  Temp 98.1  F (36.7  C) (Oral)  Wt 175 lb (79.4 kg)  LMP 09/17/2017  SpO2 99%  BMI 31.5 kg/m2  Body mass index is 31.5 kg/(m^2).  GENERAL: healthy, alert and no distress  RESP: lungs clear to auscultation - no rales, rhonchi or wheezes  CV: regular rate and rhythm, normal S1 S2, no S3 or S4, no murmur, click or rub, no peripheral edema and peripheral pulses strong  ABDOMEN: soft, no hepatosplenomegaly, no masses and bowel sounds normal POSITIVE for tenderness with palpation over bladder. No CVA tenderness  SKIN: no suspicious lesions or rashes  PSYCH: mentation appears normal, affect normal/bright    Diagnostic Test Results:  See orders    ASSESSMENT/PLAN:         ICD-10-CM    1. Acute cystitis with hematuria N30.01 UA reflex to Microscopic and Culture     Urine Microscopic     Urine Culture Aerobic Bacterial     sulfamethoxazole-trimethoprim (BACTRIM DS/SEPTRA DS) 800-160 MG per tablet   2. Burning with urination R30.0 UA reflex to Microscopic and Culture     Urine Microscopic     Urine Culture Aerobic Bacterial     phenazopyridine (PYRIDIUM) 200 MG tablet   3. Need for prophylactic vaccination  and inoculation against influenza Z23 FLU VAC, SPLIT VIRUS IM > 3 YO (QUADRIVALENT) [78102]     Vaccine Administration, Initial [71255]       See Patient Instructions    DC Barry  [facility] VICKEY  Injectable Influenza Immunization Documentation    1.  Is the person to be vaccinated sick today?   No    2. Does the person to be vaccinated have an allergy to a component   of the vaccine?   No    3. Has the person to be vaccinated ever had a serious reaction   to influenza vaccine in the past?   No    4. Has the person to be vaccinated ever had Guillain-Barré syndrome?   No    Form completed by Romy Sterling MA

## 2017-10-08 LAB
BACTERIA SPEC CULT: ABNORMAL
SPECIMEN SOURCE: ABNORMAL

## 2017-10-09 NOTE — PROGRESS NOTES
Peter Chacon,    Thank you for your recent office visit.    Here are your recent results.  The urine culture shows that your antibiotic should be effective for your urinary tract infection.  Please follow up if your symptoms persist or worsen.     Feel free to contact me via Its[employer]URLs or call the clinic at [phone].    Sincerely,    Jennifer Alcocer, YOAN, FNP-BC

## 2017-12-20 ENCOUNTER — OFFICE VISIT (OUTPATIENT)
Dept: OBGYN | Facility: CLINIC | Age: 37
End: 2017-12-20
Payer: COMMERCIAL

## 2017-12-20 VITALS
BODY MASS INDEX: 31.84 KG/M2 | DIASTOLIC BLOOD PRESSURE: 86 MMHG | WEIGHT: 176.9 LBS | HEART RATE: 98 BPM | TEMPERATURE: 97.9 F | OXYGEN SATURATION: 98 % | SYSTOLIC BLOOD PRESSURE: 117 MMHG

## 2017-12-20 DIAGNOSIS — Z87.42 HISTORY OF FEMALE INFERTILITY: Primary | ICD-10-CM

## 2017-12-20 DIAGNOSIS — N96 HISTORY OF RECURRENT MISCARRIAGES: ICD-10-CM

## 2017-12-20 PROCEDURE — 00000401 ZZHCL STATISTIC THROMBIN TIME NC: Performed by: OBSTETRICS & GYNECOLOGY

## 2017-12-20 PROCEDURE — 85730 THROMBOPLASTIN TIME PARTIAL: CPT | Performed by: OBSTETRICS & GYNECOLOGY

## 2017-12-20 PROCEDURE — 85613 RUSSELL VIPER VENOM DILUTED: CPT | Performed by: OBSTETRICS & GYNECOLOGY

## 2017-12-20 PROCEDURE — 86147 CARDIOLIPIN ANTIBODY EA IG: CPT | Performed by: OBSTETRICS & GYNECOLOGY

## 2017-12-20 PROCEDURE — 00000167 ZZHCL STATISTIC INR NC: Performed by: OBSTETRICS & GYNECOLOGY

## 2017-12-20 PROCEDURE — 36415 COLL VENOUS BLD VENIPUNCTURE: CPT | Performed by: OBSTETRICS & GYNECOLOGY

## 2017-12-20 PROCEDURE — 99214 OFFICE O/P EST MOD 30 MIN: CPT | Performed by: OBSTETRICS & GYNECOLOGY

## 2017-12-20 RX ORDER — CLOMIPHENE CITRATE 50 MG/1
50 TABLET ORAL DAILY
Qty: 20 TABLET | Refills: 1 | Status: SHIPPED | OUTPATIENT
Start: 2017-12-20 | End: 2018-09-12

## 2017-12-20 NOTE — NURSING NOTE
"Chief Complaint   Patient presents with     Consult     Infertility       Initial /86  Pulse 98  Temp 97.9  F (36.6  C) (Oral)  Wt 80.2 kg (176 lb 14.4 oz)  LMP 11/26/2017  SpO2 98%  BMI 31.84 kg/m2 Estimated body mass index is 31.84 kg/(m^2) as calculated from the following:    Height as of 7/31/17: 1.588 m (5' 2.5\").    Weight as of this encounter: 80.2 kg (176 lb 14.4 oz).  Medication Reconciliation: complete   Michelle Johnson CMA      "

## 2017-12-20 NOTE — PATIENT INSTRUCTIONS
If you have any questions regarding your visit, Please contact your care team.    Women s Health CLINIC HOURS TELEPHONE NUMBER   MD Michelle Blackwood CMA Lisa -    AASHISH Sweeney RN       Monday:       7:30-4:30 [place]  Wednesday:       7:30-4:30 [place]  Thursday:       7:30-1:30 [place]  Friday:       7:30-11:30 [facility]  [address] [place]  [address]  [phone] ask for Women's [facility]  [address] [place] [address]  [phone] ask for [facility]    Imaging Scheduling for [place]:  [phone]    Imaging Scheduling for [place]: [phone]       Urgent Care locations:    [facility] Saturday and Sunday   9 am - 5 pm    Monday-Friday   12 pm - 8 pm  Saturday and Sunday   9 am - 5 pm   [phone]     [facility] Labor and Delivery:  [phone]    If you need a medication refill, please contact your pharmacy. Please allow 3 business days for your refill to be completed.  As always, Thank you for trusting us with your healthcare needs!

## 2017-12-20 NOTE — MR AVS SNAPSHOT
After Visit Summary   12/20/2017    Ines Gaitan    MRN: [id number]           Patient Information     Date Of Birth          1980        Visit Information        Provider Department      12/20/2017 11:30 AM Ravi Garcia MD [facility]        Today's Diagnoses     History of female infertility    -  1    History of recurrent miscarriages          Care Instructions                                                         If you have any questions regarding your visit, Please contact your care team.    Women s Health CLINIC HOURS TELEPHONE NUMBER   MD Michelle Blackwood CMA Lisa -    AASHISH Sweeney RN       Monday:       7:30-4:30 [place]  Wednesday:       7:30-4:30 [place]  Thursday:       7:30-1:30 [place]  Friday:       7:30-11:30 [facility]  [address]  [address]  [phone] ask for [facility]  [address] [place] [address]  [phone] ask for [facility]    Imaging Scheduling for [place]:  [phone]    Imaging Scheduling for [place]: [phone]       Urgent Care locations:    [facility] Saturday and Sunday   9 am - 5 pm    Monday-Friday   12 pm - 8 pm  Saturday and Sunday   9 am - 5 pm   [phone]     [facility] Labor and Delivery:  [phone]    If you need a medication refill, please contact your pharmacy. Please allow 3 business days for your refill to be completed.  As always, Thank you for trusting us with your healthcare needs!              Follow-ups after your visit        Who to contact     If you have questions or need follow up information about today's clinic visit or your schedule please contact [facility] directly at [phone].  Normal or non-critical lab and imaging results will be communicated to you by MyChart, letter or phone within 4  business days after the clinic has received the results. If you do not hear from us within 7 days, please contact the clinic through [employer] or phone. If you have a critical or abnormal lab result, we will notify you by phone as soon as possible.  Submit refill requests through [employer] or call your pharmacy and they will forward the refill request to us. Please allow 3 business days for your refill to be completed.          Additional Information About Your Visit        [employer] Information     [employer] gives you secure access to your electronic health record. If you see a primary care provider, you can also send messages to your care team and make appointments. If you have questions, please call your primary care clinic.  If you do not have a primary care provider, please call [phone] and they will assist you.        Care EveryWhere ID     This is your Care EveryWhere ID. This could be used by other organizations to access your [place] medical records  [id number]        Your Vitals Were     Pulse Temperature Last Period Pulse Oximetry BMI (Body Mass Index)       98 97.9  F (36.6  C) (Oral) 11/26/2017 98% 31.84 kg/m2        Blood Pressure from Last 3 Encounters:   12/20/17 117/86   10/06/17 117/83   07/31/17 120/68    Weight from Last 3 Encounters:   12/20/17 80.2 kg (176 lb 14.4 oz)   10/06/17 79.4 kg (175 lb)   10/05/17 78.8 kg (173 lb 12.8 oz)              We Performed the Following     Cardiolipin Vicky IgG and IgM     Lupus Anticoagulant Panel          Today's Medication Changes          These changes are accurate as of: 12/20/17 11:59 PM.  If you have any questions, ask your nurse or doctor.               Start taking these medicines.        Dose/Directions    clomiPHENE 50 MG tablet   Commonly known as:  CLOMID   Used for:  History of female infertility   Started by:  Ravi Garcia MD        Dose:  50 mg   Take 1 tablet (50 mg) by mouth daily   Quantity:  20 tablet   Refills:  1            Where to  get your medicines      These medications were sent to [facility] - [place], [place] - [address]  [address], [facility] [address]     Phone:  [phone]     clomiPHENE 50 MG tablet                Primary Care Provider Office Phone # Fax #    Guy Paulson [phone]       [address] [place] [address]        Equal Access to Services     BEKA WEAVER : Hadii aad ku hadasho Soomaali, waaxda luqadaha, qaybta kaalmada adeegyada, waxay idiin hayaan adeeg kharaagustín kaplan . So [facility] [phone].    ATENCIÓN: Si habla español, tiene a aguilar disposición servicios gratuitos de asistencia lingüística. Ll[facility] [phone].    We comply with applicable federal civil rights laws and [place] laws. We do not discriminate on the basis of race, color, national origin, age, disability, sex, sexual orientation, or gender identity.            Thank you!     Thank you for choosing [facility]  for your care. Our goal is always to provide you with excellent care. Hearing back from our patients is one way we can continue to improve our services. Please take a few minutes to complete the written survey that you may receive in the mail after your visit with us. Thank you!             Your Updated Medication List - Protect others around you: Learn how to safely use, store and throw away your medicines at www.disposemymeds.org.          This list is accurate as of: 12/20/17 11:59 PM.  Always use your most recent med list.                   Brand Name Dispense Instructions for use Diagnosis    cholecalciferol 21816 UNITS capsule    VITAMIN D3    10 capsule    Take 1 capsule (50,000 Units) by mouth once a week    Vitamin D deficiency       clomiPHENE 50 MG tablet    CLOMID    20 tablet    Take 1 tablet (50 mg) by mouth daily    History of female infertility       ferrous sulfate 142 (45 FE) MG Tbcr    SLO-FE    90 tablet    Take  1 tablet (142 mg) by mouth daily    Iron deficiency anemia, unspecified iron deficiency anemia type       phenazopyridine 200 MG tablet    PYRIDIUM    6 tablet    Take 1 tablet (200 mg) by mouth 3 times daily as needed for irritation    Burning with urination       PRENATAL 1 PO

## 2017-12-21 LAB
CARDIOLIPIN ANTIBODY IGG: <1.6 GPL-U/ML (ref 0–19.9)
CARDIOLIPIN ANTIBODY IGM: 0.9 MPL-U/ML (ref 0–19.9)

## 2017-12-22 LAB — LA PPP-IMP: NEGATIVE

## 2017-12-26 NOTE — PROGRESS NOTES
Ines is a 37 year old   here to re-start infertility treatment.  She has been on clomid previously and did conceive, but had miscarriages.  She is very concerned.  ROS: No urinary frequency or dysuria, bladder or kidney problems  ROS: Ten point review of systems was reviewed and negative except the above.    PMH: Her past medical, surgical, and obstetric histories were reviewed and are documented in their appropriate chart areas.    ALL/Meds: Her medication and allergy histories were reviewed and are documented in their appropriate chart areas.    SH/FMH: Reviewed and documented in the appropriate area.    PE: /86  Pulse 98  Temp 97.9  F (36.6  C) (Oral)  Wt 80.2 kg (176 lb 14.4 oz)  LMP 2017  SpO2 98%  BMI 31.84 kg/m2    Discussed risks and benefits of clomid ovulation induction including increased risk of multiple pregnancy and ovarian hyperstimulation.  She appears to understand and wishes to proceed.  Will induce withdrawal bleed with prometrium 200mg/day times 7 days and start Clomid 50 mg daily on cycle days5-9.  Check [unfilled]..  Discussed recurrent miscarriages, and that generally they are genetic, but that there can be a treatable issue.  She does want to proceed with testing for Antiphospholipid antibody syndrome.  If that is negative, then she will re-start the clomid.    A/P:   Ines presents with (Z87.42) History of female infertility  (primary encounter diagnosis)  Comment:   Plan: clomiPHENE (CLOMID) 50 MG tablet      (N96) History of recurrent miscarriages    Plan: Cardiolipin Vicky IgG and IgM, Lupus         Anticoagulant Panel                -  Out of 30 minutes spent face to face with the patient, > 50% of this was spent in consultation.  Orders Placed This Encounter   Procedures     Cardiolipin Vicky IgG and IgM     Lupus Anticoagulant Panel         Ravi Garcia MD FACOG

## 2018-09-12 ENCOUNTER — OFFICE VISIT (OUTPATIENT)
Dept: OBGYN | Facility: CLINIC | Age: 38
End: 2018-09-12

## 2018-09-12 VITALS
DIASTOLIC BLOOD PRESSURE: 81 MMHG | OXYGEN SATURATION: 99 % | WEIGHT: 180.8 LBS | HEART RATE: 79 BPM | BODY MASS INDEX: 32.54 KG/M2 | TEMPERATURE: 98 F | SYSTOLIC BLOOD PRESSURE: 117 MMHG

## 2018-09-12 DIAGNOSIS — Z87.42 HISTORY OF FEMALE INFERTILITY: ICD-10-CM

## 2018-09-12 PROCEDURE — 99214 OFFICE O/P EST MOD 30 MIN: CPT | Performed by: OBSTETRICS & GYNECOLOGY

## 2018-09-12 RX ORDER — CLOMIPHENE CITRATE 50 MG/1
50 TABLET ORAL DAILY
Qty: 5 TABLET | Refills: 3 | Status: SHIPPED | OUTPATIENT
Start: 2018-09-12 | End: 2019-03-04

## 2018-09-12 NOTE — PATIENT INSTRUCTIONS
If you have any questions regarding your visit, Please contact your care team.    Women s Health CLINIC HOURS TELEPHONE NUMBER   MD Michelle Blackwood CMA Lisa -    AASHISH Robertson       Monday:       7:30-4:30 [place]  Wednesday:       7:30-4:30 [place]  Thursday:       7:30-1:30 [place]  Friday:       7:30-11:30 [facility]  [address] [place]  [address]  [phone] ask for [facility]  [address] [place] [address]  [phone] ask for Women's [facility]    Imaging Scheduling for [place]:  [phone]    Imaging Scheduling for [place]: [phone]       Urgent Care locations:    [facility] Saturday and Sunday   9 am - 5 pm    Monday-Friday   12 pm - 8 pm  Saturday and Sunday   9 am - 5 pm   [phone]     [facility] Labor and Delivery:  [phone]    If you need a medication refill, please contact your pharmacy. Please allow 3 business days for your refill to be completed.  As always, Thank you for trusting us with your healthcare needs!  Instructions for Ovulation Induction with Clomid    1.  Keep a menstrual diary.  Begin counting each month with the first day of bleeding.    2.  Take the Clomid each day on menstrual days 5-9.    3.  Expect ovulation on or about menstrual day # 14.    4.  You should be having intercourse at least every other day from menstrual day  12 - 16.    5.  Contact the office when you have your cycle, we will schedule a progesterone level at the lab for menstrual day 21, 22, or 23.    6.  If you have not had a cycle by day 32, take a pregnancy test.  If it is negative, take the Prometrium (200mg daily for 7 days).  Expect a cycle 5-7 days after finishing the perscription.    7.  Contact our office to determine the next dose of Clomid.

## 2018-09-12 NOTE — MR AVS SNAPSHOT
After Visit Summary   9/12/2018    Ines Gaitan    MRN: [id number]           Patient Information     Date Of Birth          1980        Visit Information        Provider Department      9/12/2018 11:30 AM Ravi Garcia MD [facility]        Today's Diagnoses     History of female infertility          Care Instructions                                                         If you have any questions regarding your visit, Please contact your care team.    Women s Health CLINIC HOURS TELEPHONE NUMBER   MD Michelle Blackwood CMA Lisa -    AASHISH Robertson       Monday:       7:30-4:30 [place]  Wednesday:       7:30-4:30 [place]  Thursday:       7:30-1:30 [place]  Friday:       7:30-11:30 [facility]  [address] [place], [place]  [id number] [phone] ask for [facility]  [address] [place] [address]  [phone] ask for [facility]    Imaging Scheduling for [place]:  [phone]    Imaging Scheduling for [place]: [phone]       Urgent Care locations:    [facility] Saturday and Sunday   9 am - 5 pm    Monday-Friday   12 pm - 8 pm  Saturday and Sunday   9 am - 5 pm   [phone]     [facility] Labor and Delivery:  [phone]    If you need a medication refill, please contact your pharmacy. Please allow 3 business days for your refill to be completed.  As always, Thank you for trusting us with your healthcare needs!  Instructions for Ovulation Induction with Clomid    1.  Keep a menstrual diary.  Begin counting each month with the first day of bleeding.    2.  Take the Clomid each day on menstrual days 5-9.    3.  Expect ovulation on or about menstrual day # 14.    4.  You should be having intercourse at least every other day from menstrual day  12 - 16.    5.  Contact the office when you have your cycle, we will  schedule a progesterone level at the lab for menstrual day 21, 22, or 23.    6.  If you have not had a cycle by day 32, take a pregnancy test.  If it is negative, take the Prometrium (200mg daily for 7 days).  Expect a cycle 5-7 days after finishing the perscription.    7.  Contact our office to determine the next dose of Clomid.              Follow-ups after your visit        Who to contact     If you have questions or need follow up information about today's clinic visit or your schedule please contact [facility] directly at [phone].  Normal or non-critical lab and imaging results will be communicated to you by [employer], letter or phone within 4 business days after the clinic has received the results. If you do not hear from us within 7 days, please contact the clinic through [employer] or phone. If you have a critical or abnormal lab result, we will notify you by phone as soon as possible.  Submit refill requests through [employer] or call your pharmacy and they will forward the refill request to us. Please allow 3 business days for your refill to be completed.          Additional Information About Your Visit        MyChart Information     [employer] gives you secure access to your electronic health record. If you see a primary care provider, you can also send messages to your care team and make appointments. If you have questions, please call your primary care clinic.  If you do not have a primary care provider, please call [phone] and they will assist you.        Care EveryWhere ID     This is your Care EveryWhere ID. This could be used by other organizations to access your [place] medical records  [id number]        Your Vitals Were     Pulse Temperature Last Period Pulse Oximetry BMI (Body Mass Index)       79 98  F (36.7  C) (Oral) 09/08/2018 99% 32.54 kg/m2        Blood Pressure from Last 3 Encounters:   09/17/18 123/83   09/12/18 117/81   12/20/17 117/86    Weight from Last 3  Encounters:   09/17/18 179 lb 12.8 oz (81.6 kg)   09/12/18 180 lb 12.8 oz (82 kg)   12/20/17 176 lb 14.4 oz (80.2 kg)              Today, you had the following     No orders found for display         Where to get your medicines      These medications were sent to [facility] - [place], [place] - [address]  [address], [facility] [address]     Phone:  [phone]     clomiPHENE 50 MG tablet          Primary Care Provider Office Phone # Fax #    Guy Paulson,  [phone]       [address] [place] [address]        Equal Access to Services     BEKA WEAVER : Nevaeh tierneyo Sotahir, waaxda luqadaha, qaybta kaalmada adeegyada, triny kaplan . So [facility] [phone].    ATENCIÓN: Si habla español, tiene a aguilar disposición servicios gratuitos de asistencia lingüística. Llame al [phone].    We comply with applicable federal civil rights laws and [place] laws. We do not discriminate on the basis of race, color, national origin, age, disability, sex, sexual orientation, or gender identity.            Thank you!     Thank you for choosing [facility]  for your care. Our goal is always to provide you with excellent care. Hearing back from our patients is one way we can continue to improve our services. Please take a few minutes to complete the written survey that you may receive in the mail after your visit with us. Thank you!             Your Updated Medication List - Protect others around you: Learn how to safely use, store and throw away your medicines at www.disposemymeds.org.          This list is accurate as of 9/12/18 11:59 PM.  Always use your most recent med list.                   Brand Name Dispense Instructions for use Diagnosis    clomiPHENE 50 MG tablet    CLOMID    5 tablet    Take 1 tablet (50 mg) by mouth daily    History of female infertility       PRENATAL 1 PO

## 2018-09-17 ENCOUNTER — OFFICE VISIT (OUTPATIENT)
Dept: FAMILY MEDICINE | Facility: CLINIC | Age: 38
End: 2018-09-17

## 2018-09-17 VITALS
HEIGHT: 62 IN | TEMPERATURE: 98 F | BODY MASS INDEX: 33.09 KG/M2 | HEART RATE: 88 BPM | RESPIRATION RATE: 16 BRPM | DIASTOLIC BLOOD PRESSURE: 83 MMHG | OXYGEN SATURATION: 99 % | SYSTOLIC BLOOD PRESSURE: 123 MMHG | WEIGHT: 179.8 LBS

## 2018-09-17 DIAGNOSIS — N30.01 ACUTE CYSTITIS WITH HEMATURIA: Primary | ICD-10-CM

## 2018-09-17 DIAGNOSIS — Z13.220 LIPID SCREENING: ICD-10-CM

## 2018-09-17 DIAGNOSIS — R82.90 NONSPECIFIC FINDING ON EXAMINATION OF URINE: ICD-10-CM

## 2018-09-17 DIAGNOSIS — N89.8 VAGINAL ODOR: ICD-10-CM

## 2018-09-17 DIAGNOSIS — Z13.29 SCREENING FOR THYROID DISORDER: ICD-10-CM

## 2018-09-17 DIAGNOSIS — R39.9 URINARY SYMPTOM OR SIGN: ICD-10-CM

## 2018-09-17 DIAGNOSIS — D64.9 HEMOGLOBIN LOW: ICD-10-CM

## 2018-09-17 DIAGNOSIS — Z13.1 SCREENING FOR DIABETES MELLITUS: ICD-10-CM

## 2018-09-17 LAB
ALBUMIN UR-MCNC: ABNORMAL MG/DL
APPEARANCE UR: ABNORMAL
BACTERIA #/AREA URNS HPF: ABNORMAL /HPF
BILIRUB UR QL STRIP: NEGATIVE
COLOR UR AUTO: YELLOW
GLUCOSE UR STRIP-MCNC: NEGATIVE MG/DL
HGB UR QL STRIP: ABNORMAL
KETONES UR STRIP-MCNC: NEGATIVE MG/DL
LEUKOCYTE ESTERASE UR QL STRIP: ABNORMAL
NITRATE UR QL: NEGATIVE
NON-SQ EPI CELLS #/AREA URNS LPF: ABNORMAL /LPF
PH UR STRIP: 5.5 PH (ref 5–7)
RBC #/AREA URNS AUTO: ABNORMAL /HPF
SOURCE: ABNORMAL
SP GR UR STRIP: <=1.005 (ref 1–1.03)
SPECIMEN SOURCE: NORMAL
UROBILINOGEN UR STRIP-ACNC: 0.2 EU/DL (ref 0.2–1)
WBC #/AREA URNS AUTO: ABNORMAL /HPF
WBC CLUMPS #/AREA URNS HPF: PRESENT /HPF
WET PREP SPEC: NORMAL

## 2018-09-17 PROCEDURE — 87210 SMEAR WET MOUNT SALINE/INK: CPT | Performed by: NURSE PRACTITIONER

## 2018-09-17 PROCEDURE — 87086 URINE CULTURE/COLONY COUNT: CPT | Performed by: NURSE PRACTITIONER

## 2018-09-17 PROCEDURE — 99214 OFFICE O/P EST MOD 30 MIN: CPT | Performed by: NURSE PRACTITIONER

## 2018-09-17 PROCEDURE — 81001 URINALYSIS AUTO W/SCOPE: CPT | Performed by: NURSE PRACTITIONER

## 2018-09-17 RX ORDER — NITROFURANTOIN 25; 75 MG/1; MG/1
100 CAPSULE ORAL 2 TIMES DAILY
Qty: 10 CAPSULE | Refills: 0 | Status: SHIPPED | OUTPATIENT
Start: 2018-09-17 | End: 2018-09-22

## 2018-09-17 NOTE — PROGRESS NOTES
SUBJECTIVE:   Ines Gaitan is a 37 year old female who presents to clinic today for the following health issues:      Urinary SYMPTOMS     Onset: 2 days    Description:   Painful urination (Dysuria): YES  Blood in urine (Hematuria): YES  Frequency/Urgency: YES  Abdominal/Pelvic/Flank Pain : no     Other vaginal symptoms: none    Progression of Symptoms:  worsening    History:   History of frequent UTI's: no   History of kidney stones: no   Sexually Active: YES  New Partner: no     Possibility of pregnancy: currently not pregnant; taking clomid     Therapies Tried and outcome: none      Problem list and histories reviewed & adjusted, as indicated.  Additional history: as documented    Patient Active Problem List   Diagnosis     CARDIOVASCULAR SCREENING; LDL GOAL LESS THAN 160     Chronic constipation     Vitamin D deficiency     Pain in thoracic spine     Subclinical hyperthyroidism     Iron deficiency anemia, unspecified iron deficiency anemia type     History of female infertility     History reviewed. No pertinent surgical history.    Social History   Substance Use Topics     Smoking status: Never Smoker     Smokeless tobacco: Never Used     Alcohol use No     Family History   Problem Relation Age of Onset     Hypertension Mother      Diabetes Father      Cerebrovascular Disease Father      Heart Surgery Father      Thyroid Disease Sister      Cancer Maternal Grandmother      liver     Macular Degeneration No family hx of      Glaucoma No family hx of          Current Outpatient Prescriptions   Medication Sig Dispense Refill     nitroFURantoin, macrocrystal-monohydrate, (MACROBID) 100 MG capsule Take 1 capsule (100 mg) by mouth 2 times daily for 5 days 10 capsule 0     clomiPHENE (CLOMID) 50 MG tablet Take 1 tablet (50 mg) by mouth daily 5 tablet 3     Prenatal MV-Min-Fe Fum-FA-DHA (PRENATAL 1 PO)        No Known Allergies  Recent Labs   Lab Test  07/31/17   1512  03/08/17   1316   07/11/16   1153  12/14/15    "1224   05/27/14   1928   LDL   --    --    --    --    --    --   113   HDL   --    --    --    --    --    --   40*   TRIG   --    --    --    --    --    --   141   ALT  23   --    --   27  26   < >   --    CR  0.79   --    --   0.72  0.70   < >   --    GFRESTIMATED  82   --    --   >90  Non  GFR Calc    >90  Non  GFR Calc     < >   --    GFRESTBLACK  >90   GFR Calc     --    --   >90   GFR Calc    >90   GFR Calc     < >   --    POTASSIUM  3.8   --    --   4.2  3.9   < >   --    TSH  1.72  1.35   < >  0.03*   --    < >  4.28    < > = values in this interval not displayed.      BP Readings from Last 3 Encounters:   09/17/18 123/83   09/12/18 117/81   12/20/17 117/86    Wt Readings from Last 3 Encounters:   09/17/18 179 lb 12.8 oz (81.6 kg)   09/12/18 180 lb 12.8 oz (82 kg)   12/20/17 176 lb 14.4 oz (80.2 kg)                  Labs reviewed in EPIC    Reviewed and updated as needed this visit by clinical staff  Tobacco  Allergies  Meds  Med Hx  Surg Hx  Fam Hx  Soc Hx      Reviewed and updated as needed this visit by Provider         ROS:  Constitutional, HEENT, cardiovascular, pulmonary, GI, , musculoskeletal, neuro, skin, endocrine and psych systems are negative, except as otherwise noted.    OBJECTIVE:     /83  Pulse 88  Temp 98  F (36.7  C) (Oral)  Resp 16  Ht 5' 2\" (1.575 m)  Wt 179 lb 12.8 oz (81.6 kg)  LMP 09/08/2018  SpO2 99%  Breastfeeding? No  BMI 32.89 kg/m2  Body mass index is 32.89 kg/(m^2).  GENERAL: healthy, alert and no distress  RESP: lungs clear to auscultation - no rales, rhonchi or wheezes  CV: regular rate and rhythm, normal S1 S2, no S3 or S4, no murmur, click or rub, no peripheral edema and peripheral pulses strong  ABDOMEN: soft, nontender, no hepatosplenomegaly, no masses and bowel sounds normal  SKIN: no suspicious rashes  PSYCH: mentation appears normal, affect normal/bright    Diagnostic " Test Results:  See orders    ASSESSMENT/PLAN:         ICD-10-CM    1. Acute cystitis with hematuria N30.01 nitroFURantoin, macrocrystal-monohydrate, (MACROBID) 100 MG capsule   2. Urinary symptom or sign R39.9 UA reflex to Microscopic and Culture     Urine Microscopic   3. Screening for thyroid disorder Z13.29 TSH with free T4 reflex     CANCELED: TSH with free T4 reflex   4. Hemoglobin low D64.9 CBC with platelets and differential     CANCELED: CBC with platelets and differential    history of low hgb   5. Screening for diabetes mellitus Z13.1 Hemoglobin A1c     CANCELED: Hemoglobin A1c   6. Lipid screening Z13.220 Lipid panel reflex to direct LDL Fasting     CANCELED: Lipid panel reflex to direct LDL Fasting   7. Vaginal odor N89.8 Wet prep   8. Nonspecific finding on examination of urine R82.90 Urine Culture Aerobic Bacterial       See Patient Instructions:  Take full course of antibiotics.  Follow up as needed if your symptoms persist or worsen.    Jennifer Alcocer, DC  [facility]

## 2018-09-17 NOTE — MR AVS SNAPSHOT
"              After Visit Summary   9/17/2018    Ines Gaitan    MRN: [id number]           Patient Information     Date Of Birth          1980        Visit Information        Provider Department      9/17/2018 1:40 PM Jennifer Alcocer NP [facility]        Today's Diagnoses     Urinary symptom or sign    -  1    Screening for thyroid disorder        Hemoglobin low        Screening for diabetes mellitus        Lipid screening        Vaginal odor        Nonspecific finding on examination of urine        Acute cystitis with hematuria          Care Instructions     Take full course of antibiotics.  Follow up as needed if your symptoms persist or worsen.  * BLADDER INFECTION,Female (Adult)    A bladder infection (\"cystitis\" or \"UTI\") usually causes a constant urge to urinate and a burning when passing urine. Urine may be cloudy, smelly or dark. There may be pain in the lower abdomen. A bladder infection occurs when bacteria from the vaginal area enter the bladder opening (urethra). This can occur from sexual intercourse, wearing tight clothing, dehydration and other factors.  HOME CARE:  1. Drink lots of fluids (at least 6-8 glasses a day, unless you must restrict fluids for other medical reasons). This will force the medicine into your urinary system and flush the bacteria out of your body. Cranberry juice has been shown to help clear out the bacteria.  2. Avoid sexual intercourse until your symptoms are gone.  3. A bladder infection is treated with antibiotics. You may also be given Pyridium (generic = phenazopyridine) to reduce the burning sensation. This medicine will cause your urine to become a bright orange color. The orange urine may stain clothing. You may wear a pad or panty-liner to protect clothing.  PREVENTING FUTURE INFECTIONS:  1. Always wipe from front to back after a bowel movement.  2. Keep the genital area clean and dry.  3. Drink plenty of fluids each day to avoid " dehydration.  4. Urinate right after intercourse to flush out the bladder.  5. Wear cotton underwear and cotton-lined panty hose; avoid tight-fitting pants.  6. If you are on birth control pills and are having frequent bladder infections, discuss with your doctor.  FOLLOW UP: Return to this facility or see your doctor if ALL symptoms are not gone after three days of treatment.  GET PROMPT MEDICAL ATTENTION if any of the following occur:    Fever over 101 F (38.3 C)    No improvement by the third day of treatment    Increasing back or abdominal pain    Repeated vomiting; unable to keep medicine down    Weakness, dizziness or fainting    Vaginal discharge    Pain, redness or swelling in the labia (outer vaginal area)    2745-8119 [employer]. [address] [place] [address], [place] [address]. All rights reserved. This information is not intended as a substitute for professional medical care. Always follow your healthcare professional's instructions.  This information has been modified by your health care provider with permission from the publisher.            Follow-ups after your visit        Follow-up notes from your care team     Return if symptoms worsen or fail to improve.      Who to contact     Normal or non-critical lab and imaging results will be communicated to you by [employer], letter or phone within 4 business days after the clinic has received the results. If you do not hear from us within 7 days, please contact the clinic through "[employer]t or phone. If you have a critical or abnormal lab result, we will notify you by phone as soon as possible.  Submit refill requests through [facility] or call your pharmacy and they will forward the refill request to us. Please allow 3 business days for your refill to be completed.          If you need to speak with a  for additional information , please call: [phone]             Additional Information About Your Visit        [facility] Information      "[employer] gives you secure access to your electronic health record. If you see a primary care provider, you can also send messages to your care team and make appointments. If you have questions, please call your primary care clinic.  If you do not have a primary care provider, please call [phone] and they will assist you.        Care EveryWhere ID     This is your Care EveryWhere ID. This could be used by other organizations to access your [facility] medical records  [id number]        Your Vitals Were     Pulse Temperature Respirations Height Last Period Pulse Oximetry    88 98  F (36.7  C) (Oral) 16 5' 2\" (1.575 m) 09/08/2018 99%    Breastfeeding? BMI (Body Mass Index)                No 32.89 kg/m2           Blood Pressure from Last 3 Encounters:   09/17/18 123/83   09/12/18 117/81   12/20/17 117/86    Weight from Last 3 Encounters:   09/17/18 179 lb 12.8 oz (81.6 kg)   09/12/18 180 lb 12.8 oz (82 kg)   12/20/17 176 lb 14.4 oz (80.2 kg)              We Performed the Following     CBC with platelets and differential     Hemoglobin A1c     Lipid panel reflex to direct LDL Fasting     TSH with free T4 reflex     UA reflex to Microscopic and Culture     Urine Culture Aerobic Bacterial     Urine Microscopic     Wet prep          Today's Medication Changes          These changes are accurate as of 9/17/18  2:27 PM.  If you have any questions, ask your nurse or doctor.               Start taking these medicines.        Dose/Directions    nitroFURantoin (macrocrystal-monohydrate) 100 MG capsule   Commonly known as:  MACROBID   Used for:  Acute cystitis with hematuria   Started by:  Jennifer Alcocer NP        Dose:  100 mg   Take 1 capsule (100 mg) by mouth 2 times daily for 5 days   Quantity:  10 capsule   Refills:  0            Where to get your medicines      These medications were sent to [facility] - [address] [facility]  [address] [facility] [place] [address]     Phone:  [phone] "     nitroFURantoin (macrocrystal-monohydrate) 100 MG capsule                Primary Care Provider Office Phone # Fax #    Guy Paulson [phone]       [facility] [address]        Equal Access to Services     BEKA WEAVER : Nevaeh bergman hadasho Soomaali, waaxda luqadaha, qaybta kaalmada adeegyada, waxay jose hayeder schwartzreinaldoagustín menjivar. So [facility] [phone].    ATENCIÓN: Si habla español, tiene a aguilar disposición servicios gratuitos de asistencia lingüística. Llame al [phone].    We comply with applicable federal civil rights laws and [place] laws. We do not discriminate on the basis of race, color, national origin, age, disability, sex, sexual orientation, or gender identity.            Thank you!     Thank you for choosing [facility]  for your care. Our goal is always to provide you with excellent care. Hearing back from our patients is one way we can continue to improve our services. Please take a few minutes to complete the written survey that you may receive in the mail after your visit with us. Thank you!             Your Updated Medication List - Protect others around you: Learn how to safely use, store and throw away your medicines at www.disposemymeds.org.          This list is accurate as of 9/17/18  2:27 PM.  Always use your most recent med list.                   Brand Name Dispense Instructions for use Diagnosis    clomiPHENE 50 MG tablet    CLOMID    5 tablet    Take 1 tablet (50 mg) by mouth daily    History of female infertility       nitroFURantoin (macrocrystal-monohydrate) 100 MG capsule    MACROBID    10 capsule    Take 1 capsule (100 mg) by mouth 2 times daily for 5 days    Acute cystitis with hematuria       PRENATAL 1 PO

## 2018-09-17 NOTE — PATIENT INSTRUCTIONS
" Take full course of antibiotics.  Follow up as needed if your symptoms persist or worsen.  * BLADDER INFECTION,Female (Adult)    A bladder infection (\"cystitis\" or \"UTI\") usually causes a constant urge to urinate and a burning when passing urine. Urine may be cloudy, smelly or dark. There may be pain in the lower abdomen. A bladder infection occurs when bacteria from the vaginal area enter the bladder opening (urethra). This can occur from sexual intercourse, wearing tight clothing, dehydration and other factors.  HOME CARE:  1. Drink lots of fluids (at least 6-8 glasses a day, unless you must restrict fluids for other medical reasons). This will force the medicine into your urinary system and flush the bacteria out of your body. Cranberry juice has been shown to help clear out the bacteria.  2. Avoid sexual intercourse until your symptoms are gone.  3. A bladder infection is treated with antibiotics. You may also be given Pyridium (generic = phenazopyridine) to reduce the burning sensation. This medicine will cause your urine to become a bright orange color. The orange urine may stain clothing. You may wear a pad or panty-liner to protect clothing.  PREVENTING FUTURE INFECTIONS:  1. Always wipe from front to back after a bowel movement.  2. Keep the genital area clean and dry.  3. Drink plenty of fluids each day to avoid dehydration.  4. Urinate right after intercourse to flush out the bladder.  5. Wear cotton underwear and cotton-lined panty hose; avoid tight-fitting pants.  6. If you are on birth control pills and are having frequent bladder infections, discuss with your doctor.  FOLLOW UP: Return to this facility or see your doctor if ALL symptoms are not gone after three days of treatment.  GET PROMPT MEDICAL ATTENTION if any of the following occur:    Fever over 101 F (38.3 C)    No improvement by the third day of treatment    Increasing back or abdominal pain    Repeated vomiting; unable to keep medicine " down    Weakness, dizziness or fainting    Vaginal discharge    Pain, redness or swelling in the labia (outer vaginal area)    9543-6671 [employer]. [address] [place] [address], [place] [address]. All rights reserved. This information is not intended as a substitute for professional medical care. Always follow your healthcare professional's instructions.  This information has been modified by your health care provider with permission from the publisher.

## 2018-09-17 NOTE — NURSING NOTE
"Chief Complaint   Patient presents with     Urinary Problem       Initial /83  Pulse 88  Temp 98  F (36.7  C) (Oral)  Resp 16  Ht 5' 2\" (1.575 m)  Wt 179 lb 12.8 oz (81.6 kg)  LMP 09/08/2018  SpO2 99%  Breastfeeding? No  BMI 32.89 kg/m2 Estimated body mass index is 32.89 kg/(m^2) as calculated from the following:    Height as of this encounter: 5' 2\" (1.575 m).    Weight as of this encounter: 179 lb 12.8 oz (81.6 kg).  Medication Reconciliation: complete     Romy Sterling MA  "

## 2018-09-18 LAB
BACTERIA SPEC CULT: NORMAL
SPECIMEN SOURCE: NORMAL

## 2018-09-28 NOTE — PROGRESS NOTES
Ines is a 37 year old   here because she wants to re-start infertility treatment.  She had been started on clomid previously, but then stopped trying last year.  She wants to re-start treatment..  .  ROS: No urinary frequency or dysuria, bladder or kidney problems, POSITIVE for:, irregular menses  ROS: Ten point review of systems was reviewed and negative except the above.    PMH: Her past medical, surgical, and obstetric histories were reviewed and are documented in their appropriate chart areas.    ALL/Meds: Her medication and allergy histories were reviewed and are documented in their appropriate chart areas.    SH/FMH: Reviewed and documented in the appropriate area.    PE: /81  Pulse 79  Temp 98  F (36.7  C) (Oral)  Wt 180 lb 12.8 oz (82 kg)  LMP 2018  SpO2 99%  BMI 32.54 kg/m2    Discussed risks and benefits of clomid ovulation induction including increased risk of multiple pregnancy and ovarian hyperstimulation.  She appears to understand and wishes to proceed.  Will induce withdrawal bleed with prometrium 200mg/day times 7 days and start Clomid 50 mg daily on cycle days5-9.  Check [unfilled]..    A/P:   Ines presents with (Z87.42) History of female infertility  Comment: Out of 30 minutes spent face to face with the patient, > 50% of this was spent in consultation.  Plan: clomiPHENE (CLOMID) 50 MG tablet                -    No orders of the defined types were placed in this encounter.        Ravi Garcia MD FACOG

## 2019-02-25 ENCOUNTER — TRANSFERRED RECORDS (OUTPATIENT)
Dept: HEALTH INFORMATION MANAGEMENT | Facility: CLINIC | Age: 39
End: 2019-02-25

## 2019-03-04 ENCOUNTER — DOCUMENTATION ONLY (OUTPATIENT)
Dept: LAB | Facility: CLINIC | Age: 39
End: 2019-03-04

## 2019-03-04 ENCOUNTER — OFFICE VISIT (OUTPATIENT)
Dept: FAMILY MEDICINE | Facility: CLINIC | Age: 39
End: 2019-03-04
Payer: COMMERCIAL

## 2019-03-04 VITALS
SYSTOLIC BLOOD PRESSURE: 120 MMHG | HEIGHT: 62 IN | DIASTOLIC BLOOD PRESSURE: 78 MMHG | BODY MASS INDEX: 33.31 KG/M2 | WEIGHT: 181 LBS

## 2019-03-04 DIAGNOSIS — E55.9 VITAMIN D DEFICIENCY: ICD-10-CM

## 2019-03-04 DIAGNOSIS — G43.009 MIGRAINE WITHOUT AURA AND WITHOUT STATUS MIGRAINOSUS, NOT INTRACTABLE: ICD-10-CM

## 2019-03-04 DIAGNOSIS — K59.09 CHRONIC CONSTIPATION: ICD-10-CM

## 2019-03-04 DIAGNOSIS — N97.9 INFERTILITY, FEMALE: ICD-10-CM

## 2019-03-04 DIAGNOSIS — E05.90 SUBCLINICAL HYPERTHYROIDISM: ICD-10-CM

## 2019-03-04 DIAGNOSIS — Z13.220 LIPID SCREENING: ICD-10-CM

## 2019-03-04 DIAGNOSIS — Z00.01 ENCOUNTER FOR ROUTINE ADULT MEDICAL EXAM WITH ABNORMAL FINDINGS: Primary | ICD-10-CM

## 2019-03-04 DIAGNOSIS — Z12.4 CERVICAL CANCER SCREENING: ICD-10-CM

## 2019-03-04 DIAGNOSIS — R73.09 ABNORMAL GLUCOSE TOLERANCE TEST: ICD-10-CM

## 2019-03-04 DIAGNOSIS — D50.9 IRON DEFICIENCY ANEMIA, UNSPECIFIED IRON DEFICIENCY ANEMIA TYPE: ICD-10-CM

## 2019-03-04 LAB
BASOPHILS # BLD AUTO: 0 10E9/L (ref 0–0.2)
BASOPHILS NFR BLD AUTO: 0.4 %
DIFFERENTIAL METHOD BLD: ABNORMAL
EOSINOPHIL # BLD AUTO: 0.1 10E9/L (ref 0–0.7)
EOSINOPHIL NFR BLD AUTO: 1.6 %
ERYTHROCYTE [DISTWIDTH] IN BLOOD BY AUTOMATED COUNT: 20.1 % (ref 10–15)
HBA1C MFR BLD: 5.5 % (ref 0–5.6)
HCT VFR BLD AUTO: 30.1 % (ref 35–47)
HGB BLD-MCNC: 9.3 G/DL (ref 11.7–15.7)
LYMPHOCYTES # BLD AUTO: 1.7 10E9/L (ref 0.8–5.3)
LYMPHOCYTES NFR BLD AUTO: 32.6 %
MCH RBC QN AUTO: 19 PG (ref 26.5–33)
MCHC RBC AUTO-ENTMCNC: 30.9 G/DL (ref 31.5–36.5)
MCV RBC AUTO: 62 FL (ref 78–100)
MONOCYTES # BLD AUTO: 0.4 10E9/L (ref 0–1.3)
MONOCYTES NFR BLD AUTO: 8.3 %
NEUTROPHILS # BLD AUTO: 3 10E9/L (ref 1.6–8.3)
NEUTROPHILS NFR BLD AUTO: 57.1 %
PLATELET # BLD AUTO: 333 10E9/L (ref 150–450)
RBC # BLD AUTO: 4.89 10E12/L (ref 3.8–5.2)
WBC # BLD AUTO: 5.2 10E9/L (ref 4–11)

## 2019-03-04 PROCEDURE — 84443 ASSAY THYROID STIM HORMONE: CPT | Performed by: FAMILY MEDICINE

## 2019-03-04 PROCEDURE — 80048 BASIC METABOLIC PNL TOTAL CA: CPT | Performed by: FAMILY MEDICINE

## 2019-03-04 PROCEDURE — 99395 PREV VISIT EST AGE 18-39: CPT | Performed by: FAMILY MEDICINE

## 2019-03-04 PROCEDURE — 83036 HEMOGLOBIN GLYCOSYLATED A1C: CPT | Performed by: FAMILY MEDICINE

## 2019-03-04 PROCEDURE — G0145 SCR C/V CYTO,THINLAYER,RESCR: HCPCS | Performed by: FAMILY MEDICINE

## 2019-03-04 PROCEDURE — 85025 COMPLETE CBC W/AUTO DIFF WBC: CPT | Performed by: FAMILY MEDICINE

## 2019-03-04 PROCEDURE — 99213 OFFICE O/P EST LOW 20 MIN: CPT | Mod: 25 | Performed by: FAMILY MEDICINE

## 2019-03-04 PROCEDURE — 80061 LIPID PANEL: CPT | Performed by: FAMILY MEDICINE

## 2019-03-04 PROCEDURE — 36415 COLL VENOUS BLD VENIPUNCTURE: CPT | Performed by: FAMILY MEDICINE

## 2019-03-04 PROCEDURE — 87624 HPV HI-RISK TYP POOLED RSLT: CPT | Performed by: FAMILY MEDICINE

## 2019-03-04 RX ORDER — ERGOCALCIFEROL 1.25 MG/1
50000 CAPSULE, LIQUID FILLED ORAL WEEKLY
Qty: 10 CAPSULE | Refills: 0 | Status: SHIPPED | OUTPATIENT
Start: 2019-03-04 | End: 2019-07-16

## 2019-03-04 ASSESSMENT — ENCOUNTER SYMPTOMS
ABDOMINAL PAIN: 0
EYE PAIN: 0
DIARRHEA: 0
SORE THROAT: 0
DIZZINESS: 0
CHILLS: 0
HEARTBURN: 0
HEMATOCHEZIA: 0
PARESTHESIAS: 0
SHORTNESS OF BREATH: 1
NAUSEA: 0
NERVOUS/ANXIOUS: 0
BREAST MASS: 0
MYALGIAS: 1
CONSTIPATION: 1
WEAKNESS: 0
HEADACHES: 1
DYSURIA: 0
COUGH: 0
FEVER: 0
HEMATURIA: 0
ARTHRALGIAS: 0
PALPITATIONS: 0
FREQUENCY: 0
JOINT SWELLING: 0

## 2019-03-04 ASSESSMENT — MIFFLIN-ST. JEOR: SCORE: 1449.26

## 2019-03-04 NOTE — PROGRESS NOTES
SUBJECTIVE:   CC: Ines Gaitan is an 38 year old woman who presents for preventive health visit.     Physical   Annual:     Getting at least 3 servings of Calcium per day:  Yes    Bi-annual eye exam:  Yes    Dental care twice a year:  NO    Sleep apnea or symptoms of sleep apnea:  None    Diet:  Vegetarian/vegan    Frequency of exercise:  1 day/week    Duration of exercise:  30-45 minutes    Taking medications regularly:  Yes    Additional concerns today:  No    PHQ-2 Total Score: 1    Headache  Onset: on/off    Description:   Location: right eye/ temple area and wraps around her head   Character: throbbing pain, at times sharp  Frequency:  varies  Duration:  Each episode will last 2-3 days    Intensity: severe    Progression of Symptoms:  Improve with time    Accompanying Signs & Symptoms:  Stiff neck: no   Neck or upper back pain: no   Fever: no   Sinus pressure: no   Nausea or vomiting: no   Dizziness: no   Numbness:   Weakness: no   Visual changes: no     History:   Head trauma: no   Family history of migraines: no   Previous tests for headaches: no   Neurologist evaluations: no   Able to do daily activities: YES  Wake with a headaches: no   Do headaches wake you up: no   Daily pain medication use: no   Work/school stressors/changes: yes    Precipitating factors:   Does light make it worse: no   Does sound make it worse: YES,m wants quiet and closes eye    Alleviating factors:  Does sleep help: YES    Therapies Tried and outcome: Tylenol may or may not help  Last month 2-3 days of headache, not leting her work  Pulsating one side, noise sensitivity, no light sensitivity  No vision changes, tylenol helped  No history of migraines      Patient is going through IVF and wants to have AMH and TSH checked    No heavy periods, regular  Eating the same  She has declined iv iron infusions, she is not complaint with iron orally  Not taking iron tabs, no chest pain, no palpitation, no sob, no chest pain, no dizziness,  some fatigue but chronic          Today's PHQ-2 Score:   PHQ-2 (  [employer]) 3/4/2019   Q1: Little interest or pleasure in doing things 1   Q2: Feeling down, depressed or hopeless 0   PHQ-2 Score 1   Q1: Little interest or pleasure in doing things Several days   Q2: Feeling down, depressed or hopeless Not at all   PHQ-2 Score 1       Abuse: Current or Past(Physical, Sexual or Emotional)- No  Do you feel safe in your environment? Yes    Social History     Tobacco Use     Smoking status: Never Smoker     Smokeless tobacco: Never Used   Substance Use Topics     Alcohol use: No     Alcohol Use 3/4/2019   If you drink alcohol do you typically have greater than 3 drinks per day OR greater than 7 drinks per week? No   No flowsheet data found.    Reviewed orders with patient.  Reviewed health maintenance and updated orders accordingly - Yes  Labs reviewed in [facility]    Mammogram Screening: Patient under age 50, mutual decision reflected in health maintenance.      Pertinent mammograms are reviewed under the imaging tab.  History of abnormal Pap smear: NO - age 30- 65 PAP every 3 years recommended  PAP / HPV Latest Ref Rng & Units 3/4/2019 9/15/2016 2014   PAP - NIL NIL ASC-US(A)   HPV 16 DNA NEG:Negative Negative Negative -   HPV 18 DNA NEG:Negative Negative Negative -   OTHER HR HPV NEG:Negative Negative Negative -     Reviewed and updated as needed this visit by clinical staff  Tobacco  Allergies         Reviewed and updated as needed this visit by Provider        Past Medical History:   Diagnosis Date     ASCUS of cervix with negative high risk HPV 2014    neg HPV. Plan cotest in 3 years.     MEDICAL HISTORY OF - 2006    infection of L5-S1.  ?abscess Treatment in [place]     Subclinical hyperthyroidism     Check a TSH each trimester     Tuberculosis       No past surgical history on file.  Obstetric History       T0      L0     SAB2   TAB0   Ectopic0   Multiple0   Live Births0       # Outcome  "Date GA Lbr Ant/2nd Weight Sex Delivery Anes PTL Lv   2 SAB 10/2016     SAB      1 SAB 11/2015 SAB             Review of Systems   Constitutional: Negative for chills and fever.   HENT: Negative for congestion, ear pain, hearing loss and sore throat.    Eyes: Negative for pain and visual disturbance.   Respiratory: Positive for shortness of breath. Negative for cough.    Cardiovascular: Negative for chest pain, palpitations and peripheral edema.   Gastrointestinal: Positive for constipation. Negative for abdominal pain, diarrhea, heartburn, hematochezia and nausea.   Breasts:  Negative for tenderness, breast mass and discharge.   Genitourinary: Negative for dysuria, frequency, genital sores, hematuria, pelvic pain, urgency, vaginal bleeding and vaginal discharge.   Musculoskeletal: Positive for myalgias. Negative for arthralgias and joint swelling.   Skin: Negative for rash.   Neurological: Positive for headaches. Negative for dizziness, weakness and paresthesias.   Psychiatric/Behavioral: Positive for mood changes. The patient is not nervous/anxious.      CONSTITUTIONAL: NEGATIVE for fever, chills, change in weight  INTEGUMENTARU/SKIN: NEGATIVE for worrisome rashes, moles or lesions  EYES: NEGATIVE for vision changes or irritation  ENT: NEGATIVE for ear, mouth and throat problems  RESP: NEGATIVE for significant cough or SOB  BREAST: NEGATIVE for masses, tenderness or discharge  CV: NEGATIVE for chest pain, palpitations or peripheral edema  GI: NEGATIVE for nausea, abdominal pain, heartburn, or change in bowel habits  : NEGATIVE for unusual urinary or vaginal symptoms. Periods are regular.  MUSCULOSKELETAL: NEGATIVE for significant arthralgias or myalgia  NEURO: NEGATIVE for weakness, dizziness or paresthesias  PSYCHIATRIC: NEGATIVE for changes in mood or affect     OBJECTIVE:   /78 (BP Location: Right arm, Patient Position: Sitting, Cuff Size: Adult Regular)   Ht 1.575 m (5' 2\")   Wt 82.1 kg (181 lb)  "  BMI 33.11 kg/m    Physical Exam  GENERAL: healthy, alert and no distress  EYES: Eyes grossly normal to inspection, PERRL and conjunctivae and sclerae normal  HENT: ear canals and TM's normal, nose and mouth without ulcers or lesions  NECK: no adenopathy, no asymmetry, masses, or scars and thyroid normal to palpation  RESP: lungs clear to auscultation - no rales, rhonchi or wheezes  BREAST: normal without masses, tenderness or nipple discharge and no palpable axillary masses or adenopathy  CV: regular rate and rhythm, normal S1 S2, no S3 or S4, no murmur, click or rub, no peripheral edema and peripheral pulses strong  ABDOMEN: soft, nontender, no hepatosplenomegaly, no masses and bowel sounds normal   (female): normal female external genitalia, normal urethral meatus, vaginal mucosa pink, moist, well rugated, and normal cervix/adnexa/uterus without masses or discharge  MS: no gross musculoskeletal defects noted, no edema  SKIN: no suspicious lesions or rashes  NEURO: Normal strength and tone, mentation intact and speech normal  PSYCH: mentation appears normal, affect normal/bright    Diagnostic Test Results:  No results found for this or any previous visit (from the past 24 hour(s)).  Results for orders placed or performed in visit on 03/04/19   Hemoglobin A1c   Result Value Ref Range    Hemoglobin A1C 5.5 0 - 5.6 %   CBC with platelets and differential   Result Value Ref Range    WBC 5.2 4.0 - 11.0 10e9/L    RBC Count 4.89 3.8 - 5.2 10e12/L    Hemoglobin 9.3 (L) 11.7 - 15.7 g/dL    Hematocrit 30.1 (L) 35.0 - 47.0 %    MCV 62 (L) 78 - 100 fl    MCH 19.0 (L) 26.5 - 33.0 pg    MCHC 30.9 (L) 31.5 - 36.5 g/dL    RDW 20.1 (H) 10.0 - 15.0 %    Platelet Count 333 150 - 450 10e9/L    % Neutrophils 57.1 %    % Lymphocytes 32.6 %    % Monocytes 8.3 %    % Eosinophils 1.6 %    % Basophils 0.4 %    Absolute Neutrophil 3.0 1.6 - 8.3 10e9/L    Absolute Lymphocytes 1.7 0.8 - 5.3 10e9/L    Absolute Monocytes 0.4 0.0 - 1.3 10e9/L     Absolute Eosinophils 0.1 0.0 - 0.7 10e9/L    Absolute Basophils 0.0 0.0 - 0.2 10e9/L    Diff Method Automated Method    Lipid panel reflex to direct LDL Fasting   Result Value Ref Range    Cholesterol 201 (H) <200 mg/dL    Triglycerides 120 <150 mg/dL    HDL Cholesterol 38 (L) >49 mg/dL    LDL Cholesterol Calculated 139 (H) <100 mg/dL    Non HDL Cholesterol 163 (H) <130 mg/dL   Basic metabolic panel   Result Value Ref Range    Sodium 140 133 - 144 mmol/L    Potassium 4.4 3.4 - 5.3 mmol/L    Chloride 110 (H) 94 - 109 mmol/L    Carbon Dioxide 23 20 - 32 mmol/L    Anion Gap 7 3 - 14 mmol/L    Glucose 91 70 - 99 mg/dL    Urea Nitrogen 11 7 - 30 mg/dL    Creatinine 0.78 0.52 - 1.04 mg/dL    GFR Estimate >90 >60 mL/min/[1.73_m2]    GFR Estimate If Black >90 >60 mL/min/[1.73_m2]    Calcium 9.2 8.5 - 10.1 mg/dL   TSH with free T4 reflex   Result Value Ref Range    TSH 3.80 0.40 - 4.00 mU/L   Pap imaged thin layer screen with HPV - recommended age 30 - 65 years (select HPV order below)   Result Value Ref Range    PAP NIL     Copath Report         Patient Name: LIBAN HERMAN  MR#: [id number]  Specimen #: [id number]  Collected: 3/4/2019  Received: 3/5/2019  Reported: 3/6/2019 14:53  Ordering Phy(s): PALOMA RAMÍREZ    For improved result formatting, select 'View Enhanced Report Format' under   Linked Documents section.    SPECIMEN/STAIN PROCESS:  Pap imaged thin layer prep screening (Surepath, FocalPoint with guided   screening)       Pap-Cyto x 1, HPV ordered x 1    SOURCE: Cervical, endocervical  ----------------------------------------------------------------   Pap imaged thin layer prep screening (Surepath, FocalPoint with guided   screening)  SPECIMEN ADEQUACY:  Satisfactory for evaluation.  -Transformation zone component present.    CYTOLOGIC INTERPRETATION:    Negative for intraepithelial lesion or malignancy    Electronically signed out by:  HARLEY Barry (ASCP)    Processed and screened at [place]  [facility]    CLINICAL HISTORY:    A previous normal pap  D ate of Last Pap: 9/15/16,    Papanicolaou Test Limitations:  Cervical cytology is a screening test with   limited sensitivity; regular  screening is critical for cancer prevention; Pap tests are primarily   effective for the diagnosis/prevention of  squamous cell carcinoma, not adenocarcinomas or other cancers.    TESTING LAB LOCATION:  [address]  [phone]    COLLECTION SITE:  Client:  [facility]  Location: [facility] (B)     HPV High Risk Types DNA Cervical   Result Value Ref Range    HPV Source SurePath     HPV 16 DNA Negative NEG^Negative    HPV 18 DNA Negative NEG^Negative    Other HR HPV Negative NEG^Negative    Final Diagnosis This patient's sample is negative for HPV DNA.     Specimen Description Cervical Cells        ASSESSMENT/PLAN:       ICD-10-CM    1. Encounter for routine adult medical exam with abnormal findings Z00.01 CBC with platelets and differential     Basic metabolic panel   2. Vitamin D deficiency E55.9 vitamin D2 (ERGOCALCIFEROL) 01983 units (1250 mcg) capsule   3. Iron deficiency anemia, unspecified iron deficiency anemia type D50.9 ferrous sulfate ER (SLO-FE) 142 (45 Fe) MG CR tablet   4. Migraine without aura and without status migrainosus, not intractable G43.009 CBC with platelets and differential   5. Chronic constipation K59.09    6. Subclinical hyperthyroidism E05.90 TSH with free T4 reflex   7. Infertility, female N97.9 Basic metabolic panel     Anti-Mullerian Hormone ([facility])   8. Lipid screening Z13.220 Lipid panel reflex to direct LDL Fasting   9. Abnormal glucose tolerance test R73.02 Hemoglobin A1c   10. Cervical cancer screening Z12.4 Pap imaged thin layer screen with HPV - recommended age 30 - 65 years (select HPV order below)     HPV High Risk Types DNA Cervical  "  iron def anemia-patient is vegetarian, advised nutritionist referral, not heavy periods, she declined iv infusions in the past , normalized hgb in the past with oral iron but she is non compliant. Advised restarting and following up    Vit D def-Vitamin D and iron needs to be restarted  followup in 8 -10 weeks  Infertility-she is to start treatment in phan  Headache--resolves with ibuprofen, advised prn tylenol. Follow up if not improving, no warning signs, no head trauma    History of subclinical hypothyroidism-check labs  Overweight-lifestyle changes reviewed  Initially declined pap but at the end of the visit wanted to do it    Awaiting labs   COUNSELING:  Reviewed preventive health counseling, as reflected in patient instructions    BP Readings from Last 1 Encounters:   03/04/19 120/78     Estimated body mass index is 33.11 kg/m  as calculated from the following:    Height as of this encounter: 1.575 m (5' 2\").    Weight as of this encounter: 82.1 kg (181 lb).      Weight management plan: Discussed healthy diet and exercise guidelines     reports that  has never smoked. she has never used smokeless tobacco.      Counseling Resources:  ATP IV Guidelines  Pooled Cohorts Equation Calculator  Breast Cancer Risk Calculator  FRAX Risk Assessment  ICSI Preventive Guidelines  Dietary Guidelines for Americans, 2010  USDA's MyPlate  ASA Prophylaxis  Lung CA Screening    Guy Paulson DO  [facility]  "

## 2019-03-04 NOTE — PROGRESS NOTES
"   SUBJECTIVE:   CC: Ines Gaitan is an 38 year old woman who presents for preventive health visit.     Healthy Habits:    Do you get at least three servings of calcium containing foods daily (dairy, green leafy vegetables, etc.)? { :666201::\"yes\"}    Amount of exercise or daily activities, outside of work: { :371687}    Problems taking medications regularly { :526871::\"No\"}    Medication side effects: { :847477::\"No\"}    Have you had an eye exam in the past two years? { :940188}    Do you see a dentist twice per year? { :171592}    Do you have sleep apnea, excessive snoring or daytime drowsiness?{ :419214}  {Outside tests to abstract? :621864}    {additional problems to add (Optional):349395}    Today's PHQ-2 Score:   PHQ-2 ( 1999 [employer]) 3/4/2019 9/17/2018   Q1: Little interest or pleasure in doing things 1 0   Q2: Feeling down, depressed or hopeless 0 0   PHQ-2 Score 1 0   Q1: Little interest or pleasure in doing things Several days -   Q2: Feeling down, depressed or hopeless Not at all -   PHQ-2 Score 1 -     {PHQ-2 LOOK IN ASSESSMENTS (Optional) :049866}  Abuse: Current or Past(Physical, Sexual or Emotional)- {YES/NO/NA:848697}  Do you feel safe in your environment? {YES/NO/NA:825018}    Social History     Tobacco Use     Smoking status: Never Smoker     Smokeless tobacco: Never Used   Substance Use Topics     Alcohol use: No     If you drink alcohol do you typically have >3 drinks per day or >7 drinks per week? {ETOH :181287}                     Reviewed orders with patient.  Reviewed health maintenance and updated orders accordingly - {Yes/No:024839::\"Yes\"}  {Chronicprobdata (Optional):859868}    {Mammo Decision Support (Optional):966559}    Pertinent mammograms are reviewed under the imaging tab.  History of abnormal Pap smear: {PAP HX:762801}  PAP / HPV Latest Ref Rng & Units 9/15/2016 5/27/2014   PAP - NIL ASC-US(A)   HPV 16 DNA NEG Negative -   HPV 18 DNA NEG Negative -   OTHER HR HPV NEG Negative - " "    Reviewed and updated as needed this visit by clinical staff         Reviewed and updated as needed this visit by Provider        {HISTORY OPTIONS (Optional):484847}    ROS:  { :087017}    OBJECTIVE:   There were no vitals taken for this visit.  EXAM:  {Exam Choices:828270}    {Diagnostic Test Results (Optional):031420::\"Diagnostic Test Results:\",\"none \"}    ASSESSMENT/PLAN:   {Diag Picklist:663871}    COUNSELING:   {FEMALE COUNSELING MESSAGES:727180::\"Reviewed preventive health counseling, as reflected in patient instructions\"}    BP Readings from Last 1 Encounters:   09/17/18 123/83     Estimated body mass index is 32.89 kg/m  as calculated from the following:    Height as of 9/17/18: 1.575 m (5' 2\").    Weight as of 9/17/18: 81.6 kg (179 lb 12.8 oz).    {BP Counseling- Complete if BP >= 120/80  (Optional):666877}  {Weight Management Plan (ACO) Complete if BMI is abnormal-  Ages 18-64  BMI >24.9.  Age 65+ with BMI <23 or >30 (Optional):138349}     reports that  has never smoked. she has never used smokeless tobacco.  {Tobacco Cessation -- Complete if patient is a smoker (Optional):556310}    Counseling Resources:  ATP IV Guidelines  Pooled Cohorts Equation Calculator  Breast Cancer Risk Calculator  FRAX Risk Assessment  ICSI Preventive Guidelines  Dietary Guidelines for Americans, 2010  USDA's MyPlate  ASA Prophylaxis  Lung CA Screening    Guy Paulson DO  [facility]"

## 2019-03-04 NOTE — PATIENT INSTRUCTIONS
Vitamin D and iron needs to be restarted  followup in 8 -10 weeks  Awaiting labs   Guy Paulson D.O.      [facility]   Discharged by : Regulo Blanco CMA on 3/4/2019 at 12:40 PM    Paper scripts provided to patient :      If you have any questions regarding your visit please contact your care team:     Team Gold                Clinic Hours Telephone Number     Dr. Gill Hernandez, CNP 7am-7pm  Monday - Thursday   7am-5pm  Fridays  [phone]   (Appointment scheduling available 24/7)     RN Line  [phone] option 2     [facility] [place] [facility] [place] - 11am-9pm Monday-Friday Saturday-Sunday- 9am-5pm     [place] -   5pm-9pm Monday-Friday Saturday-Sunday- 9am-5pm    [phone] - [place]    [phone] - [place]     For a Price Quote for your services, please call our Consumer Price Line at [phone].     What options do I have for visits at the clinic other than the traditional office visit?     To expand how we care for you, many of our providers are utilizing electronic visits (e-visits) and telephone visits, when medically appropriate, for interactions with their patients rather than a visit in the clinic. We also offer nurse visits for many medical concerns. Just like any other service, we will bill your insurance company for this type of visit based on time spent on the phone with your provider. Not all insurance companies cover these visits. Please check with your medical insurance if this type of visit is covered. You will be responsible for any charges that are not paid by your insurance.   E-visits via Livestream: generally incur a $45.00 fee.     Telephone visits:  Time spent on the phone: *charged based on time that is spent on the phone in increments of 10 minutes. Estimated cost:   5-10 mins $30.00   11-20 mins. $59.00   21-30 mins. $85.00     Use Livestream (secure email communication and access to your  chart) to send your primary care provider a message or make an appointment. Ask someone on your Team how to sign up for [facility].     As always, Thank you for trusting us with your health care needs!    [facility]:    Scheduling Appointments  [place]  Call: [phone]    [place]  Call: [phone]    [facility]  Call: [phone]    For Gastroenterology referrals   [facility]   Clinics and Surgery [place], 4th Floor   [address] [place] [address]   Appointments: [phone]    WHERE TO GO FOR CARE?  Clinic    Make an appointment if you:       Are sick (cold, cough, flu, sore throat, earache or in pain).       Have a small injury (sprain, small cut, burn or broken bone).       Need a physical exam, Pap smear, vaccine or prescription refill.       Have questions about your health or medicines.    To reach us:      Call [phone] ([phone]). Open 24 hours every day. (For counseling services, call [phone].)    Log into Cleankeys at [email or web address]. (Visit [email or web address] to create an account.) Hospital emergency room    An emergency is a serious or life- threatening problem that must be treated right away.    Call 499 or get to the hospital if you have:      Very bad or sudden:            - Chest pain or pressure         - Bleeding         - Head or belly pain         - Dizziness or trouble seeing, walking or                          Speaking      Problems breathing      Blood in your vomit or you are coughing up blood      A major injury (knocked out, loss of a finger or limb, rape, broken bone protruding from skin)    A mental health crisis. (Or call the Mental Health Crisis line at 1-859.121.3282 or Suicide Prevention Hotline at 1-316.278.1055.)    Open 24 hours every day. You don't need an appointment.     Urgent care    Visit urgent care for sickness or small  injuries when the clinic is closed. You don't need an appointment. To check hours or find an urgent care near you, visit [email or web address]. Online care    Get online care from [facility] for more than 70 common problems, like colds, allergies and infections. Open 24 hours every day at:   [email or web address]   Need help deciding?    For advice about where to be seen, you may call your clinic and ask to speak with a nurse. We're here for you 24 hours every day.         If you are deaf or hard of hearing, please let us know. We provide many free services including sign language interpreters, oral interpreters, TTYs, telephone amplifiers, note takers and written materials.

## 2019-03-04 NOTE — PROGRESS NOTES
This patient declined the Anti-mullerian hormone today. She states that she needs to check with her insurance company first. If she needs it in the future please put the order in open orders. Thanks fabian

## 2019-03-05 LAB
ANION GAP SERPL CALCULATED.3IONS-SCNC: 7 MMOL/L (ref 3–14)
BUN SERPL-MCNC: 11 MG/DL (ref 7–30)
CALCIUM SERPL-MCNC: 9.2 MG/DL (ref 8.5–10.1)
CHLORIDE SERPL-SCNC: 110 MMOL/L (ref 94–109)
CHOLEST SERPL-MCNC: 201 MG/DL
CO2 SERPL-SCNC: 23 MMOL/L (ref 20–32)
CREAT SERPL-MCNC: 0.78 MG/DL (ref 0.52–1.04)
GFR SERPL CREATININE-BSD FRML MDRD: >90 ML/MIN/{1.73_M2}
GLUCOSE SERPL-MCNC: 91 MG/DL (ref 70–99)
HDLC SERPL-MCNC: 38 MG/DL
LDLC SERPL CALC-MCNC: 139 MG/DL
NONHDLC SERPL-MCNC: 163 MG/DL
POTASSIUM SERPL-SCNC: 4.4 MMOL/L (ref 3.4–5.3)
SODIUM SERPL-SCNC: 140 MMOL/L (ref 133–144)
TRIGL SERPL-MCNC: 120 MG/DL
TSH SERPL DL<=0.005 MIU/L-ACNC: 3.8 MU/L (ref 0.4–4)

## 2019-03-06 LAB
COPATH REPORT: NORMAL
PAP: NORMAL

## 2019-03-07 LAB
FINAL DIAGNOSIS: NORMAL
HPV HR 12 DNA CVX QL NAA+PROBE: NEGATIVE
HPV16 DNA SPEC QL NAA+PROBE: NEGATIVE
HPV18 DNA SPEC QL NAA+PROBE: NEGATIVE
SPECIMEN DESCRIPTION: NORMAL
SPECIMEN SOURCE CVX/VAG CYTO: NORMAL

## 2019-03-11 NOTE — RESULT ENCOUNTER NOTE
"Your cholesterol is abnormal, please use the recommendations below and recheck labs in 6 months.    Ways to improve your cholesterol...    1- Eats less saturated fats (including avoiding \"trans\" fats).    2 - Eat more unsaturated fats  - found in vege  tables, grains, and tree nuts.   Also by replacing butter with canola oil or olive oil.    3 - Eat more nuts.   1-2 ounces (a small handful) of almonds, walnuts, hazelnuts or pecans once a  day in place of other less healthy snacks.    4 - Eat more high   fiber foods - vegetables and whole grains including oat bran, oats, beans, peas, and flax seed.    5 - Eat more fish - such as salmon, tuna, mackerel, and sardines.  1 or 2 six ounce servings per week is a healthy replacement for other proteins.    6 - E  xercise for at least 120 minutes per week - which is equal to 30 minutes 4 days per week.    Guy Paulson D.O.    "

## 2019-05-10 ENCOUNTER — TELEPHONE (OUTPATIENT)
Dept: OBGYN | Facility: CLINIC | Age: 39
End: 2019-05-10

## 2019-05-10 NOTE — TELEPHONE ENCOUNTER
[facility] Call Center    Phone Message    May a detailed message be left on voicemail: yes    Reason for Call: Other: Patient states she has two friends that are patients of the provider and she would like to see him but there is no appt's available until July. Please advise.     Action Taken: Message routed to:  [facility] p [phone]

## 2019-05-10 NOTE — TELEPHONE ENCOUNTER
Called and spoke with patient. Patient has been struggling with infertility for some time and will be starting to see specialists soon. Wants to switch OB/GYN providers based on friends recommendations, and would like to seen with Dr. Belle. Assisted in scheduling her an appt with Dr. Belle on 6/5/19 at [facility]. If there is an earlier opening, patient would like to be notified ASAP. Canceled appt with Dr. Guadarrama.

## 2019-06-05 ENCOUNTER — OFFICE VISIT (OUTPATIENT)
Dept: OBGYN | Facility: CLINIC | Age: 39
End: 2019-06-05
Payer: COMMERCIAL

## 2019-06-05 VITALS
SYSTOLIC BLOOD PRESSURE: 123 MMHG | BODY MASS INDEX: 32.56 KG/M2 | DIASTOLIC BLOOD PRESSURE: 79 MMHG | TEMPERATURE: 98.2 F | HEART RATE: 109 BPM | WEIGHT: 178 LBS

## 2019-06-05 DIAGNOSIS — E03.8 SUBCLINICAL HYPOTHYROIDISM: ICD-10-CM

## 2019-06-05 DIAGNOSIS — Z87.42 HISTORY OF FEMALE INFERTILITY: ICD-10-CM

## 2019-06-05 DIAGNOSIS — N97.9 FEMALE INFERTILITY: ICD-10-CM

## 2019-06-05 PROCEDURE — 99214 OFFICE O/P EST MOD 30 MIN: CPT | Performed by: OBSTETRICS & GYNECOLOGY

## 2019-06-05 RX ORDER — DESOGESTREL AND ETHINYL ESTRADIOL 0.15-0.03
KIT ORAL
Refills: 2 | COMMUNITY
Start: 2019-05-16 | End: 2019-09-20

## 2019-06-05 RX ORDER — LEVOTHYROXINE SODIUM 25 UG/1
TABLET ORAL
COMMUNITY
Start: 2019-05-24 | End: 2021-04-09

## 2019-06-05 NOTE — PROGRESS NOTES
OB-GYN Problem-Oriented Visit or Consultation      Ines Gaitan is a 38 year old year old P 0 who presents with a chief complaint of infertility and recurrent SAB.  Referred by friend/patient.  Patient's last menstrual period was 05/15/2019.    HPI:     2105 conception on Clomid with SAB. 2016 conception on Clomid with SAB and hysteroscopy with Myosure/dilatation and curettage. Had transient subclinical hyperthyroidism then with pos TSI. Had neg APS and maternal genetic screening due to SAB x 2. In the past yr used Clomid x 4+3 cycles with ovulation on D15 but without success. Trip to [facility] and had HSG there suggesting one blocked tube. Referred to [facility] for possible OI with IUI. Sonohysterogram and EMB were normal. AMH=3. IVF advised and patient considering but having another opinion at [facility] and pre-appt screening done but has not been seen yet. Semen analysis done (normal in past) and pending. Now on OC suppression prep. TSH drifted upward and now at 2.72 on low dose levothyroxine.  was advised to quit smoking before infertility treatment and he is trying to but desires to avoid Chantix. Also has iron def anemia of Hgb 8.5 ?from menorrhagia.  AMA.     Past medical, obstetrical, surgical, family and social history reviewed and as noted or updated in chart.     Allergies, meds and supplements are as noted or updated in chart.      ROS:   Systems reviewed include:  constitutional, gastrointestinal, genitourinary, psychological, hematologic/lymphatic and endocrine.    These systems were negative for significant symptoms except for the following additional: none; see HPI.    EXAM:  VS as noted. /79 (BP Location: Left arm, Patient Position: Chair)   Pulse 109   Temp 98.2  F (36.8  C) (Oral)   Wt 80.7 kg (178 lb)   LMP 05/15/2019   Breastfeeding? No   BMI 32.56 kg/m    Constitutionally normal.     Exam not repeated at this time.    Assessment:   Encounter Diagnoses   Name Primary?     Subclinical  hypothyroidism      History of female infertility      Female infertility      I reviewed the condition, causes, differential diagnosis, prognosis, evaluation and management considerations and options.  Questions answered and information given. See orders.         Plan and Recommendations: May complete the preliminary assessment with RE to decide on IVF. Suggest follow-up later on TSH, TSI, and Hgb. May consider contacting me to begin alternative letrozole OI if not proceeding with RE E&M- discussed in detail.  may consider Zyban.   Preconception counseling reviewed as needed including cycle timing and optimization, medical status, meds, vaccines, exposures, nutrition, folic acid, family history, genetic screening (CF carrier scr, etc.), social issues and any applicable specific risk factors.   Total encounter time (physician together with patient) = 35min. Direct counseling, education and care coordination time (physician together with patient) = 20min.     A/P:  Ines was seen today for consult.    Diagnoses and all orders for this visit:    Subclinical hypothyroidism    History of female infertility    Female infertility        Quinten Belle MD

## 2019-06-05 NOTE — PATIENT INSTRUCTIONS
If you have any questions regarding your visit, Please contact your care team.     [employer]: [phone]  [facility] CLINIC HOURS TELEPHONE NUMBER       Quinten Belle M.D.        Lolly-    Freddie Reynaga-Medical Assistant       Monday-[place]  8:00a.m-4:45 p.m  Tuesday-[place]  9:00a.m-4:00 p.m  Wednesday-[place] 8:00a.m-4:45 p.m.  Thursday-[place]  8:00a.m-4:45 p.m.  Friday-[place]  8:00a.m-4:45 p.m. [facility]  [address] [place] [address]  [phone] ask for [facility]  [phone] Fax  Imaging [phone]    [facility]  [address] [place] [address] [place] [address]  [phone]    [facility]  [address] [place] [address]  [phone] ask [facility]  [phone] Fax  Imaging [phone]     Urgent Care locations:    [facility] Monday-Friday  5 pm - 9 pm  Saturday and Sunday   9 am - 5 pm  Monday-Friday   11 am - 9 pm  Saturday and Sunday   9 am - 5 pm   [phone]   If you need a medication refill, please contact your pharmacy. Please allow 3 business days for your refill to be completed.  As always, Thank you for trusting us with your healthcare needs!

## 2019-07-10 DIAGNOSIS — E55.9 VITAMIN D DEFICIENCY: ICD-10-CM

## 2019-07-16 NOTE — TELEPHONE ENCOUNTER
"Routing refill request for Vitamin D3 to PCP. This was prescribed by you in March for 10 caps. Should patient just continue with OTC 6160-1392 units daily?  I don't see any Vitamin D labs in past few years. Last visit was 3/4/19 for routine physical. Note pasted below.  I did reach out and offer to assist with scheduling, but we were having trouble settling on a day, as she needs to coordinate with her spouse, so she'll call back to schedule.     Eileen Bowie RN     \"Vit D def-Vitamin D and iron needs to be restarted  followup in 8 -10 weeks\"  "

## 2019-07-21 RX ORDER — ERGOCALCIFEROL 1.25 MG/1
50000 CAPSULE, LIQUID FILLED ORAL WEEKLY
Qty: 10 CAPSULE | Refills: 0 | Status: SHIPPED | OUTPATIENT
Start: 2019-07-21 | End: 2021-01-07

## 2019-09-20 ENCOUNTER — OFFICE VISIT (OUTPATIENT)
Dept: FAMILY MEDICINE | Facility: CLINIC | Age: 39
End: 2019-09-20
Payer: COMMERCIAL

## 2019-09-20 VITALS
SYSTOLIC BLOOD PRESSURE: 108 MMHG | DIASTOLIC BLOOD PRESSURE: 76 MMHG | BODY MASS INDEX: 32.57 KG/M2 | HEART RATE: 92 BPM | WEIGHT: 177 LBS | TEMPERATURE: 98.2 F | HEIGHT: 62 IN

## 2019-09-20 DIAGNOSIS — N97.9 FEMALE INFERTILITY: ICD-10-CM

## 2019-09-20 DIAGNOSIS — Z01.818 PREOP GENERAL PHYSICAL EXAM: Primary | ICD-10-CM

## 2019-09-20 DIAGNOSIS — E03.8 SUBCLINICAL HYPOTHYROIDISM: ICD-10-CM

## 2019-09-20 DIAGNOSIS — D50.9 IRON DEFICIENCY ANEMIA, UNSPECIFIED IRON DEFICIENCY ANEMIA TYPE: ICD-10-CM

## 2019-09-20 PROCEDURE — 99214 OFFICE O/P EST MOD 30 MIN: CPT | Performed by: FAMILY MEDICINE

## 2019-09-20 RX ORDER — ASPIRIN 81 MG/1
81 TABLET ORAL DAILY
COMMUNITY
End: 2020-08-28

## 2019-09-20 ASSESSMENT — MIFFLIN-ST. JEOR: SCORE: 1431.12

## 2019-09-20 NOTE — PATIENT INSTRUCTIONS
Before Your Surgery      Call your surgeon if there is any change in your health. This includes signs of a cold or flu (such as a sore throat, runny nose, cough, rash or fever).    Do not smoke, drink alcohol or take over the counter medicine (unless your surgeon or primary care doctor tells you to) for the 24 hours before and after surgery.    If you take prescribed drugs: Follow your doctor s orders about which medicines to take and which to stop until after surgery.    Eating and drinking prior to surgery: follow the instructions from your surgeon    Take a shower or bath the night before surgery. Use the soap your surgeon gave you to gently clean your skin. If you do not have soap from your surgeon, use your regular soap. Do not shave or scrub the surgery site.  Wear clean pajamas and have clean sheets on your bed.     I recommended that you request progestorin from your reproductive endocrinologist.       I also suggested that you review [facility] acupuncture. Here is there information:   [facility] [place] [address], [address],   [email or web address]       Natalie Hatch DO

## 2019-09-20 NOTE — PROGRESS NOTES
[address] [id number] [phone]  Dept: [phone]    PRE-OP EVALUATION:  Today's date: 2019    Ines Gaitan (: 1980) presents for pre-operative evaluation assessment as requested by Dr. Gar.  She requires evaluation and anesthesia risk assessment prior to undergoing surgery/procedure for treatment of egg retrieval process .    Fax number for surgical facility: [phone]  Primary Physician: Guy Paulson  Type of Anesthesia Anticipated: to be determined    Patient has a Health Care Directive or Living Will:  YES     Preop Questions 2019   Who is doing your surgery? dr gar at [facility]   What are you having done? egg retrival process   Date of Surgery/Procedure: between  to    Facility or Hospital where procedure/surgery will be performed: [facility]   1.  Do you have a history of Heart attack, stroke, stent, coronary bypass surgery, or other heart surgery? No   2.  Do you ever have any pain or discomfort in your chest? No   3.  Do you have a history of  Heart Failure? No   4.   Are you troubled by shortness of breath when:  walking on a level surface, or up a slight hill, or at night? Yes- Shortness of breath with walking up stairs   5.  Do you currently have a cold, bronchitis or other respiratory infection? No   6.  Do you have a cough, shortness of breath, or wheezing? No   7.  Do you sometimes get pains in the calves of your legs when you walk? No   8. Do you or anyone in your family have previous history of blood clots? YES - Provoked blood clot in fathe   9.  Do you or does anyone in your family have a serious bleeding problem such as prolonged bleeding following surgeries or cuts? No   10. Have you ever had problems with anemia or been told to take iron pills? YES - medical history of anemia 2019. Hemoglobin was 9.1 per recent visit with reproductive endocrinologist.    11. Have you had any abnormal blood loss such as black, tarry or bloody stools, or abnormal vaginal bleeding? No   12. Have you ever had a blood transfusion? No   13. Have you or any of your relatives ever had problems with anesthesia? YES - Father had a problem with anesthia. Patient denies any issues.    14. Do you have sleep apnea, excessive snoring or daytime drowsiness? Snoring reported by . No other signs of sleep apnea.    15. Do you have any prosthetic heart valves? No   16. Do you have prosthetic joints? No   17. Is there any chance that you may be pregnant? No         HPI:     HPI related to upcoming procedure:     Mild Hypothyroidism   Patient is currently taking synthroid 25mg to manage this.    Reproductive History:  She has been going to see a reproductive endocrinologists. Patient reports that she was seeing her OBGYN and was taking clomid. She later had two miscarriages and is now undergoing IVF.     HYPOTHYROIDISM - Patient has a longstanding history of chronic Hypothyroidism. Patient has been doing well, noting no tremor, insomnia, hair loss or changes in skin texture. Continues to take medications as directed, without adverse reactions or side effects. Last TSH   Lab Results   Component Value Date    TSH 3.80 03/04/2019   .        MEDICAL HISTORY:     Patient Active Problem List    Diagnosis Date Noted     Subclinical hypothyroidism 06/05/2019     Priority: Medium     Female infertility 06/05/2019     Priority: Medium     Iron deficiency anemia, unspecified iron deficiency anemia type 09/02/2016     Priority: Medium     Pain in thoracic spine 09/16/2015     Priority: Medium     Vitamin D deficiency 11/07/2014     Priority: Medium     Problem list name updated by automated process. Provider to review       Chronic constipation 05/27/2014     Priority: Medium     CARDIOVASCULAR SCREENING; LDL GOAL LESS THAN 160 05/09/2014     Priority: Medium      Past Medical History:   Diagnosis Date      ASCUS of cervix with negative high risk HPV 2014    neg HPV. Plan cotest in 3 years.     Female infertility      MEDICAL HISTORY OF - 2006    infection of L5-S1.  ?abscess Treatment in [place]     Subclinical hyperthyroidism 2016    resolved; had positive TSI and normal thyroid ultrasound     Subclinical hypothyroidism 2019     Tuberculosis 2007     Past Surgical History:   Procedure Laterality Date     HC TREATMENT MISSED  SURG, 1ST TRIMESTER  2016     Current Outpatient Medications   Medication Sig Dispense Refill     aspirin 81 MG EC tablet Take 81 mg by mouth daily       ferrous sulfate ER (SLO-FE) 142 (45 Fe) MG CR tablet Take 1 tablet (142 mg) by mouth daily 90 tablet 1     levothyroxine (SYNTHROID/LEVOTHROID) 25 MCG tablet        menotropins (MENOPUR) 75 units SC SOLR Inject Subcutaneous daily       Prenatal MV-Min-Fe Fum-FA-DHA (PRENATAL 1 PO)        vitamin D2 (ERGOCALCIFEROL) 08945 units (1250 mcg) capsule Take 1 capsule (50,000 Units) by mouth once a week 10 capsule 0     GANIRELIX ACETATE SC        OTC products: None, except as noted above    No Known Allergies   Latex Allergy: NO    Social History     Tobacco Use     Smoking status: Never Smoker     Smokeless tobacco: Never Used   Substance Use Topics     Alcohol use: No     History   Drug Use No       REVIEW OF SYSTEMS:   Constitutional, neuro, ENT, endocrine, pulmonary, cardiac, gastrointestinal, genitourinary, musculoskeletal, integument and psychiatric systems are negative, except as otherwise noted.    This document serves as a record of the services and decisions personally performed and made by Natalie Hatch DO. It was created on her behalf by Annie Pollack, a trained medical scribe. The creation of this document is based on the provider's statements to the medical scribe.  Annie Pollack 12:47 PM 2019    EXAM:   /76 (Cuff Size: Adult Large)   Pulse 92   Temp 98.2  F (36.8  C) (Oral)   Ht 1.575 m  "(5' 2\")   Wt 80.3 kg (177 lb)   BMI 32.37 kg/m         GENERAL APPEARANCE: healthy, alert and no distress     EYES: EOMI, PERRL     HENT: ear canals and TM's normal and nose and mouth without ulcers or lesions     NECK: no adenopathy, no asymmetry, masses, or scars and thyroid normal to palpation     RESP: lungs clear to auscultation - no rales, rhonchi or wheezes     CV: regular rates and rhythm, normal S1 S2, no S3 or S4 and no murmur, click or rub     ABDOMEN:  soft, nontender, no HSM or masses and bowel sounds normal     MS: extremities normal- no gross deformities noted, no evidence of inflammation in joints, FROM in all extremities.     SKIN: no suspicious lesions or rashes to visible skin.      NEURO: Normal strength and tone, sensory exam grossly normal, mentation intact and speech normal     PSYCH: mentation appears normal. and affect normal/bright        DIAGNOSTICS:   No labs or EKG required for low risk surgery (cataract, skin procedure, breast biopsy, etc)    Recent Labs   Lab Test 03/04/19  1155 07/31/17  1512   HGB 9.3* 8.2*    299    139   POTASSIUM 4.4 3.8   CR 0.78 0.79   A1C 5.5  --         IMPRESSION:   Reason for surgery/procedure: egg retrieval   Diagnosis/reason for consult: egg retrieval     The proposed surgical procedure is considered LOW risk.    REVISED CARDIAC RISK INDEX  The patient has the following serious cardiovascular risks for perioperative complications such as (MI, PE, VFib and 3  AV Block):  No serious cardiac risks  INTERPRETATION: 0 risks: Class I (very low risk - 0.4% complication rate)    The patient has the following additional risks for perioperative complications:  No identified additional risks      ICD-10-CM    1. Preop general physical exam Z01.818    2. Female infertility N97.9    3. Subclinical hypothyroidism E03.9    4. Iron deficiency anemia, unspecified iron deficiency anemia type D50.9        RECOMMENDATIONS:       Anemia  Anemia and does not " require treatment prior to surgery.  Monitor Hemoglobin postoperatively.      --Patient is to take all scheduled medications on the day of surgery EXCEPT for modifications listed below.    APPROVAL GIVEN to proceed with proposed procedure, without further diagnostic evaluation     The information in this document, created by the medical scribe, Annie Morgan, for me, accurately reflects the services I personally performed and the decisions made by me. I have reviewed and approved this document for accuracy prior to leaving the patient care area.  Annie Morgan on 9/20/2019 at 4:43 PM    Signed Electronically by: Natalie Hatch DO    Copy of this evaluation report is provided to requesting physician.    [facility] Preop Guidelines    Revised Cardiac Risk Index

## 2019-09-25 ENCOUNTER — TELEPHONE (OUTPATIENT)
Dept: FAMILY MEDICINE | Facility: CLINIC | Age: 39
End: 2019-09-25

## 2019-09-25 NOTE — TELEPHONE ENCOUNTER
Returned call to pharmacy to clarify whether patient ever picked up any medication, as this Rx was from 7/21 and unsure if this is a refill request, etc. Pharmacist states that it actually appears that reproductive medicine sent in an Rx in August for high dose Vitamin D3, so he will just fill Rx based on that and nothing further needed on our end at this time.    Eileen Bowie RN

## 2019-09-25 NOTE — TELEPHONE ENCOUNTER
Reason for Call:  Medication or medication refill:    Do you use a [facility]?  Name of the pharmacy and phone number for the current request:  [facility] [phone]    Name of the medication requested: Vitamin D2    Other request: Suzi, with [facility], called and stated patient's insurance will not cover Vitamin D2 and it will cost $18. The pharmacy wants to know if Dr. Paulson would be ok with switching the prescription to Vitamin D3 because it only costs $2 with insurance. Please call back to discuss.    Can we leave a detailed message on this number? YES    Phone number patient can be reached at: [facility]: [phone]    Best Time: Anytime    Call taken on 9/25/2019 at 2:56 PM by Gogo Venegas

## 2019-10-21 ENCOUNTER — TRANSFERRED RECORDS (OUTPATIENT)
Dept: HEALTH INFORMATION MANAGEMENT | Facility: CLINIC | Age: 39
End: 2019-10-21

## 2019-10-21 ENCOUNTER — TELEPHONE (OUTPATIENT)
Dept: OBGYN | Facility: CLINIC | Age: 39
End: 2019-10-21

## 2019-10-21 NOTE — TELEPHONE ENCOUNTER
Reason for Call:  Same Day Appointment, Requested Provider:  Dr. Quinten Belle    PCP: Guy Paulson    Reason for visit: tube removal    Additional comments: Pt calling for she wanted to see if Dr. Belle can fit Pt into his 10/24/19 schedule to have a tube removal procedure done.      Can we leave a detailed message on this number? YES    Phone number patient can be reached at: Home number on file [phone] (home)    Best Time: anytime    Call taken on 10/21/2019 at 10:50 AM by Geovanny Pop

## 2019-10-22 ENCOUNTER — MEDICAL CORRESPONDENCE (OUTPATIENT)
Dept: HEALTH INFORMATION MANAGEMENT | Facility: CLINIC | Age: 39
End: 2019-10-22

## 2019-10-22 NOTE — TELEPHONE ENCOUNTER
Pt is going through IVF process, already done the egg retrieval, then they will recommend pt for the removal of tubes. Pt will keep her appointment for the 31.     Kindra Gayle RN on 10/22/2019 at 9:17 AM

## 2019-10-22 NOTE — TELEPHONE ENCOUNTER
Please call patient to clarify what is desired- possibly met with RE for infertility and IVF was planned and removal of tubes advised before this?? I can meet at a future clinic visit to discuss this operation if desired.    Quinten Belle MD

## 2019-10-31 ENCOUNTER — OFFICE VISIT (OUTPATIENT)
Dept: OBGYN | Facility: CLINIC | Age: 39
End: 2019-10-31
Payer: COMMERCIAL

## 2019-10-31 VITALS
HEART RATE: 109 BPM | BODY MASS INDEX: 32.56 KG/M2 | SYSTOLIC BLOOD PRESSURE: 125 MMHG | DIASTOLIC BLOOD PRESSURE: 84 MMHG | WEIGHT: 178 LBS

## 2019-10-31 DIAGNOSIS — N70.11 HYDROSALPINX: ICD-10-CM

## 2019-10-31 DIAGNOSIS — N97.9 FEMALE INFERTILITY: Primary | ICD-10-CM

## 2019-10-31 PROCEDURE — 99214 OFFICE O/P EST MOD 30 MIN: CPT | Performed by: OBSTETRICS & GYNECOLOGY

## 2019-10-31 RX ORDER — DESOGESTREL AND ETHINYL ESTRADIOL 0.15-0.03
KIT ORAL
COMMUNITY
Start: 2019-10-18 | End: 2020-03-13

## 2019-10-31 RX ORDER — ESTRADIOL 2 MG/1
TABLET ORAL
COMMUNITY
Start: 2019-08-20 | End: 2021-04-09

## 2019-10-31 NOTE — PATIENT INSTRUCTIONS
If you have any questions regarding your visit, Please contact your care team.     [employer] [facility]: [phone]  Women s Health CLINIC HOURS TELEPHONE NUMBER       Quinten Belle M.D.      Freddie Reynaga-Medical Assistant     Monday-[place]  8:00a.m-4:45 p.m  Tuesday-[place]  9:00a.m-4:00 p.m  Wednesday-[place] 8:00a.m-4:45 p.m.  Thursday-[place]  8:00a.m-4:45 p.m.  Friday-[place]  8:00a.m-4:45 p.m. [facility]  [address] [place] [address]  [phone] ask [facility]  [phone] Fax  Imaging [phone]    [facility]  [address] [place] [address] [place] [address]  [phone]    [facility]  [address] [place] [address]  [phone] [facility]  [phone] Fax  Imaging [phone]     Urgent Care locations:    [place]        [place] Monday-Friday  5 pm - 9 pm  Saturday and Sunday   9 am - 5 pm  Monday-Friday   11 am - 9 pm  Saturday and Sunday   9 am - 5 pm   [phone]   If you need a medication refill, please contact your pharmacy. Please allow 3 business days for your refill to be completed.  As always, Thank you for trusting us with your healthcare needs!

## 2019-11-01 ENCOUNTER — TELEPHONE (OUTPATIENT)
Dept: OBGYN | Facility: CLINIC | Age: 39
End: 2019-11-01

## 2019-11-01 ENCOUNTER — PREP FOR PROCEDURE (OUTPATIENT)
Dept: OBGYN | Facility: CLINIC | Age: 39
End: 2019-11-01

## 2019-11-01 DIAGNOSIS — N70.11 HYDROSALPINX: Primary | ICD-10-CM

## 2019-11-01 NOTE — TELEPHONE ENCOUNTER
Surgery Pre-Certification    Medical Record Number: [id number]  Ines Gaitan  YOB: 1980   Phone: [phone] (home) none (work)  Primary Provider: Guy Paulson    Reason for Admit:  Left Hydrosalpinx, possible right Hydrosalpinx,  Surgeon: Quinten Belle MD  Surgical Procedure: Laparoscopy, left salpingectomy, possible tubal dye study  ICD-9 Coded: N70.11 and N97.9  Date of Surgery: 11/25/19  Consent [id number]? N/A      [facility] [facility]    Requestor:  Haylee Jovel CMA     Location:  [facility] [phone]      Surgery Scheduled.    Date of Surgery 11/25/19 Time of Surgery 1:00 pm  Procedure: Laparoscopy, left salpingectomy, possible tubal dye study,  proceed as indicated  [facility]  Surgeon: Dr. Belle  Type of Anesthesia Anticipated: General  Pre-op: 11/19/19 with pcp at 11:40   Post op not needed  Pre-certification 11/5/19  Consent signed: n/a  Hospital Stay None        surgery packet mailed to patient's home address.  Patient instructed NPO 10 hours prior to surgery, arrive 1.5 hours prior to surgery, must have a .  Patient understood and agrees to the plan.        Please schedule surgery as noted. Block out clinic time in [facility] as needed.   NPO 8 hours prior to procedure time and shower the night before or the morning of surgery.  Also now recommend patient continue the active OC pill continuously to suppress menses and this will then be easier for our scheduling.     I can discuss additionally with patient if needed on all this.          Order Questions     Question Answer Comment   Procedure name(s) - multi select laparoscopy, left salpingectomy, possible tubal dye study, possible right salpingectomy, proceed as indicated    Reason for procedure left hydrosalpinx, possible right hydrosalpinx    Surgeon: Barbra    Is this a multi surgeon case? No    Laterality N/A    Request for additional equipment Other (see comments) None    Anesthesia General    Is the patient known to have any of the following? None of the above    Initiate Pre-op orders for above procedure: Yes, as ordered in Epic Additional orders noted there also   Location of Case: [facility]    Sterilization or Hysterectomy consent signed in advance: No Has not done this yet and should if we are considering possible right salpingectomy (has [facility])- if we strictly plan to leave the right tube in place, the consent is not needed   Operating room  requested: No    Urgency of Surgery: Routine    Surgeon Procedure Time (incision to closure) in minutes (per procedure as applicable) 60    Note:  Surgical Case Time Needed (in minutes)   Patient Class (for admit prior to surgery, specify number of days in comments): Same day (surgery center outpatient)    H&P To Be Completed By: PCP     needed? No    Post-Op Appointment None    Vendor Needed? No

## 2019-11-05 ENCOUNTER — TELEPHONE (OUTPATIENT)
Dept: OBGYN | Facility: CLINIC | Age: 39
End: 2019-11-05

## 2019-11-05 DIAGNOSIS — D64.9 ANEMIA, UNSPECIFIED TYPE: Primary | ICD-10-CM

## 2019-11-05 NOTE — TELEPHONE ENCOUNTER
I called patient and got surgery scheduled on 11/25/19 at 1:00 at the hospital.  Patient stated that you had mentioned that patient might need an iron infusion for her low HGB.  Do you want to order an HGB now to see where patient is at?  Please advise.  Haylee Jovel, [facility]  November 5, 2019 1:56 PM

## 2019-11-05 NOTE — TELEPHONE ENCOUNTER
Also, patient will be out of her birth control before surgery.  Please send a prescription to [facility].  Haylee Jovel, [facility]  November 5, 2019 2:20 PM

## 2019-11-05 NOTE — TELEPHONE ENCOUNTER
Hgb may be checked again now if desired along with other labs for anemia. Future orders placed. This may also be addressed at the time of preop exam.     The OC was prescribed and managed by RE physician prior to the IVF. I can refill this if they are not able to.   Quinten Belle MD

## 2019-11-06 NOTE — TELEPHONE ENCOUNTER
I spoke to patient and informed her that labs have been ordered and she can either do that now or at her pre-op exam.  Patient will speak to her RE provider regarding her birth control.  Informed patient that if they aren't able to refill this, to let us know.  Patient verbalized understanding.  Haylee Jovel, [facility]  November 6, 2019 2:03 PM

## 2019-11-06 NOTE — TELEPHONE ENCOUNTER
Per the [facility] portal. No PA Required for CPT code 63216. Coverage based on medical necessity.

## 2019-11-12 PROBLEM — E66.811 CLASS 1 OBESITY DUE TO EXCESS CALORIES WITHOUT SERIOUS COMORBIDITY WITH BODY MASS INDEX (BMI) OF 32.0 TO 32.9 IN ADULT: Chronic | Status: ACTIVE | Noted: 2019-11-12

## 2019-11-12 PROBLEM — E66.09 CLASS 1 OBESITY DUE TO EXCESS CALORIES WITHOUT SERIOUS COMORBIDITY WITH BODY MASS INDEX (BMI) OF 32.0 TO 32.9 IN ADULT: Chronic | Status: ACTIVE | Noted: 2019-11-12

## 2019-11-13 ENCOUNTER — OFFICE VISIT (OUTPATIENT)
Dept: SLEEP MEDICINE | Facility: CLINIC | Age: 39
End: 2019-11-13
Payer: COMMERCIAL

## 2019-11-13 VITALS
HEIGHT: 62 IN | DIASTOLIC BLOOD PRESSURE: 82 MMHG | WEIGHT: 180 LBS | BODY MASS INDEX: 33.13 KG/M2 | HEART RATE: 87 BPM | OXYGEN SATURATION: 99 % | SYSTOLIC BLOOD PRESSURE: 125 MMHG

## 2019-11-13 DIAGNOSIS — G47.9 DISTURBANCE IN SLEEP BEHAVIOR: ICD-10-CM

## 2019-11-13 DIAGNOSIS — E66.811 CLASS 1 OBESITY DUE TO EXCESS CALORIES WITHOUT SERIOUS COMORBIDITY WITH BODY MASS INDEX (BMI) OF 32.0 TO 32.9 IN ADULT: Chronic | ICD-10-CM

## 2019-11-13 DIAGNOSIS — E66.09 CLASS 1 OBESITY DUE TO EXCESS CALORIES WITHOUT SERIOUS COMORBIDITY WITH BODY MASS INDEX (BMI) OF 32.0 TO 32.9 IN ADULT: Chronic | ICD-10-CM

## 2019-11-13 DIAGNOSIS — R06.00 DYSPNEA AND RESPIRATORY ABNORMALITY: Primary | ICD-10-CM

## 2019-11-13 DIAGNOSIS — G47.09 OTHER INSOMNIA: ICD-10-CM

## 2019-11-13 DIAGNOSIS — R06.89 DYSPNEA AND RESPIRATORY ABNORMALITY: Primary | ICD-10-CM

## 2019-11-13 PROCEDURE — 99204 OFFICE O/P NEW MOD 45 MIN: CPT | Performed by: INTERNAL MEDICINE

## 2019-11-13 RX ORDER — ZOLPIDEM TARTRATE 5 MG/1
TABLET ORAL
Qty: 1 TABLET | Refills: 0 | Status: SHIPPED | OUTPATIENT
Start: 2019-11-13 | End: 2020-03-13

## 2019-11-13 ASSESSMENT — MIFFLIN-ST. JEOR: SCORE: 1444.72

## 2019-11-13 NOTE — PATIENT INSTRUCTIONS
Your BMI is Body mass index is 32.92 kg/m .  Weight management is a personal decision.  If you are interested in exploring weight loss strategies, the following discussion covers the approaches that may be successful. Body mass index (BMI) is one way to tell whether you are at a healthy weight, overweight, or obese. It measures your weight in relation to your height.  A BMI of 18.5 to 24.9 is in the healthy range. A person with a BMI of 25 to 29.9 is considered overweight, and someone with a BMI of 30 or greater is considered obese. More than two-thirds of American adults are considered overweight or obese.  Being overweight or obese increases the risk for further weight gain. Excess weight may lead to heart disease and diabetes.  Creating and following plans for healthy eating and physical activity may help you improve your health.  Weight control is part of healthy lifestyle and includes exercise, emotional health, and healthy eating habits. Careful eating habits lifelong are the mainstay of weight control. Though there are significant health benefits from weight loss, long-term weight loss with diet alone may be very difficult to achieve- studies show long-term success with dietary management in less than 10% of people. Attaining a healthy weight may be especially difficult to achieve in those with severe obesity. In some cases, medications, devices and surgical management might be considered.  What can you do?  If you are overweight or obese and are interested in methods for weight loss, you should discuss this with your provider.     Consider reducing daily calorie intake by 500 calories.     Keep a food journal.     Avoiding skipping meals, consider cutting portions instead.    Diet combined with exercise helps maintain muscle while optimizing fat loss. Strength training is particularly important for building and maintaining muscle mass. Exercise helps reduce stress, increase energy, and improves fitness.  Increasing exercise without diet control, however, may not burn enough calories to loose weight.       Start walking three days a week 10-20 minutes at a time    Work towards walking thirty minutes five days a week     Eventually, increase the speed of your walking for 1-2 minutes at time    In addition, we recommend that you review healthy lifestyles and methods for weight loss available through the National Institutes of Health patient information sites:  http://win.niddk.nih.gov/publications/index.htm    And look into health and wellness programs that may be available through your health insurance provider, employer, local community center, or hannah club.    Weight management plan: Patient was referred to their PCP to discuss a diet and exercise plan.

## 2019-11-13 NOTE — NURSING NOTE
"Chief Complaint   Patient presents with     Sleep Problem     consult- I stop breathing while sleeping.        Initial /82   Pulse 87   Ht 1.575 m (5' 2\")   Wt 81.6 kg (180 lb)   LMP 10/20/2019 (Exact Date)   SpO2 99%   BMI 32.92 kg/m   Estimated body mass index is 32.92 kg/m  as calculated from the following:    Height as of this encounter: 1.575 m (5' 2\").    Weight as of this encounter: 81.6 kg (180 lb).    Medication Reconciliation: complete    Neck circumference: 15 inches / 38 centimeters.      "

## 2019-11-13 NOTE — PROGRESS NOTES
Sleep Consultation:    Date on this visit: 11/13/2019      Primary Physician: Guy Paulson     Chief Complaint   Patient presents with     Sleep Problem     consult- I stop breathing while sleeping. Stopped breathing after sedation for in-vitro fertilization procedure         Ines goes to bed at 12:00 AM during the week. She gets up at 9:00 AM with an alarm. Sometimes she 'does not feel like getting up' and stays in bed until 11-12 pm 1-2 days per week. She usually is not sleeping. Ines has difficulty falling asleep 1-2/week because she is 'thinking about things. This has been a problem for >1 year. She wakes up 2-4 times a night with difficulty falling back to sleep 2-3 times a week. She feels she is not breathing properly. Ines wakes up to thirst, go to the bathroom and uncertain reasons.  On weekends, schedule is similar.    Patient does use electronics in bed at times.     Ines does snore. Patient does have a regular bed partner. He does not report apneas. Patient sleeps on her back < side. She has occasional morning headaches if she stays in bed long. Infrequently feels her legs get restless if she does not fall asleep until 2-3 AM.     Ines denies any sleep walking, sleep talking and dream enactment.    Ines denies difficulty breathing through her nose and reflux at night.      Patient describes themself as a morning person. Patient's [place] Sleepiness score 6/24 inconsistent with severe daytime sleepiness.  She has good energy levels.     Ines naps rarely. She takes no inadvertant naps.  She gets sleepy while driving. She uses 0-1 sodas/day.     Allergies:    No Known Allergies    Medications:    Current Outpatient Medications   Medication Sig Dispense Refill     aspirin 81 MG EC tablet Take 81 mg by mouth daily       ferrous sulfate ER (SLO-FE) 142 (45 Fe) MG CR tablet Take 1 tablet (142 mg) by mouth daily 90 tablet 1     JULEBER 0.15-30 MG-MCG tablet        levothyroxine  (SYNTHROID/LEVOTHROID) 25 MCG tablet        Prenatal MV-Min-Fe Fum-FA-DHA (PRENATAL 1 PO)        vitamin D2 (ERGOCALCIFEROL) 39307 units (1250 mcg) capsule Take 1 capsule (50,000 Units) by mouth once a week 10 capsule 0     estradiol (ESTRACE) 2 MG tablet        GANIRELIX ACETATE SC        menotropins (MENOPUR) 75 units SC SOLR Inject Subcutaneous daily         Problem List:  Patient Active Problem List    Diagnosis Date Noted     Iron deficiency anemia, unspecified iron deficiency anemia type 2016     Priority: Medium     Class 1 obesity due to excess calories without serious comorbidity with body mass index (BMI) of 32.0 to 32.9 in adult 2019     Priority: Low     Subclinical hypothyroidism 2019     Priority: Low     Female infertility 2019     Priority: Low     Pain in thoracic spine 2015     Priority: Low     Vitamin D deficiency 2014     Priority: Low     Chronic constipation 2014     Priority: Low     CARDIOVASCULAR SCREENING; LDL GOAL LESS THAN 160 2014     Priority: Low        Past Medical/Surgical History:  Past Medical History:   Diagnosis Date     ASCUS of cervix with negative high risk HPV 2014    neg HPV. Plan cotest in 3 years.     MEDICAL HISTORY OF - 2006    infection of L5-S1.  ?abscess Treatment in [place]     Subclinical hyperthyroidism 2016    resolved; had positive TSI and normal thyroid ultrasound     Tuberculosis      Past Surgical History:   Procedure Laterality Date     HC TREATMENT MISSED  SURG, 1ST TRIMESTER  2016       Social History:  Social History     Socioeconomic History     Marital status:      Spouse name: Balaji     Number of children: 0     Years of education: Not on file     Highest education level: Not on file   Occupational History     Occupation: Artist-     Employer: [employer]     Comment: on leave   Social Needs     Financial resource strain: Not on file     Food insecurity:      Worry: Not on file     Inability: Not on file     Transportation needs:     Medical: Not on file     Non-medical: Not on file   Tobacco Use     Smoking status: Never Smoker     Smokeless tobacco: Never Used   Substance and Sexual Activity     Alcohol use: No     Drug use: No     Sexual activity: Yes     Partners: Male     Birth control/protection: None   Lifestyle     Physical activity:     Days per week: Not on file     Minutes per session: Not on file     Stress: Not on file   Relationships     Social connections:     Talks on phone: Not on file     Gets together: Not on file     Attends [employer] service: Not on file     Active member of club or organization: Not on file     Attends meetings of clubs or organizations: Not on file     Relationship status: Not on file     Intimate partner violence:     Fear of current or ex partner: Not on file     Emotionally abused: Not on file     Physically abused: Not on file     Forced sexual activity: Not on file   Other Topics Concern     Parent/sibling w/ CABG, MI or angioplasty before 65F 55M? No   Social History Narrative    From [place] to [place] in [place].        Family History:  Family History   Problem Relation Age of Onset     Hypertension Mother      Diabetes Father      Cerebrovascular Disease Father      Heart Surgery Father      Thyroid Disease Sister      Cancer Maternal Grandmother         liver     Macular Degeneration No family hx of      Glaucoma No family hx of        Review of Systems:  A complete review of systems reviewed by me is negative with the exeption of what has been mentioned in the history of present illness.  CONSTITUTIONAL:  NEGATIVE for  night sweats  EYES: NEGATIVE for changes in vision, blind spots, double vision.  ENT: NEGATIVE for ear pain, sore throat, sinus pain, post-nasal drip, runny nose, bloody nose  CARDIAC:  POSITIVE for  breathlessness when lying flat and swollen feet  NEUROLOGIC:  POSITIVE for  weakness or numbness in the arms or  "legs  DERMATOLOGIC: NEGATIVE for rashes, new moles or change in mole(s)  PULMONARY:  POSITIVE for  SOB at rest and SOB with activity  GASTROINTESTINAL:  POSITIVE for  constipation  GENITOURINARY: NEGATIVE for pain during urination, blood in urine, urinating more frequently than usual, irregular menstrual periods.  MUSCULOSKELETAL: NEGATIVE for muscle pain, bone or joint pain, swollen joints.  ENDOCRINE:  POSITIVE for  increased thirst  LYMPHATIC: NEGATIVE for swollen lymph nodes, lumps or bumps in the breasts or nipple discharge.    Physical Examination:  Vitals: /82   Pulse 87   Ht 1.575 m (5' 2\")   Wt 81.6 kg (180 lb)   LMP 10/20/2019 (Exact Date)   SpO2 99%   BMI 32.92 kg/m    BMI= Body mass index is 32.92 kg/m .    Neck Cir (cm): 38 cm    [place] Total Score 11/13/2019   Total score - [place] 6       [place] Total Score: 16 (11/13/19 0600)    GENERAL APPEARANCE: healthy, alert and no distress  EYES: Eyes grossly normal to inspection and conjunctivae and sclerae normal  HENT: nose and mouth without ulcers or lesions and oropharynx crowded  NECK: no adenopathy, no asymmetry, masses, or scars and thyroid normal to palpation  RESP: poor effort  CV: regular rates and rhythm  ABDOMEN: protuberant  MS: extremities normal- no gross deformities noted  NEURO: Normal strength and tone, mentation intact, speech normal and cranial nerves 2-12 intact  PSYCH: mentation appears normal and affect flat  Mallampati Class: III.  Tonsillar Stage: 1  hidden by pillars.    Impression/Plan:    Snoring, witnessed apneas/hypoxemia after sedation for procedure, sleep maintenance difficulties, crowded oropharynx, obesity. Polysomnogram (using 4% desaturation/Medicare/2012 AASM 1B scoring rules) for modest probability obstructive sleep apnea. Ambien if needed. Patient is a poor candidate for Home Sleep Testing due to symptoms of SHAW but low pre-test probability of SHAW  (STOPBANG 0-2) and chronic insomnia. STOPBANG1     Sleep onset, " maintenance difficulties   Sleep onset difficulties are likely mostly related to poor sleep hygiene with some Psychophysiologic insomnia and delayed sleep phase. sleep maintenance difficulties may be related to  sleep disordered breathing or Psychophysiologic insomnia.  - Recommended regular wake times, getting out of bed    Mild restless leg syndrome symptoms  - No intervention required    Literature provided regarding sleep apnea.      She will follow up with me in approximately two weeks after her sleep study has been competed to review the results and discuss plan of care.       Polysomnography reviewed.  Obstructive sleep apnea reviewed.  Complications of untreated sleep apnea were reviewed.    Kody Talamantes MD     CC: Guy Paulson,

## 2019-11-19 ENCOUNTER — TELEPHONE (OUTPATIENT)
Dept: OBGYN | Facility: CLINIC | Age: 39
End: 2019-11-19

## 2019-11-19 ENCOUNTER — OFFICE VISIT (OUTPATIENT)
Dept: FAMILY MEDICINE | Facility: CLINIC | Age: 39
End: 2019-11-19
Payer: COMMERCIAL

## 2019-11-19 VITALS
SYSTOLIC BLOOD PRESSURE: 122 MMHG | HEIGHT: 62 IN | BODY MASS INDEX: 33.31 KG/M2 | HEART RATE: 88 BPM | TEMPERATURE: 98 F | WEIGHT: 181 LBS | DIASTOLIC BLOOD PRESSURE: 72 MMHG | OXYGEN SATURATION: 100 %

## 2019-11-19 DIAGNOSIS — D64.9 ANEMIA, UNSPECIFIED TYPE: ICD-10-CM

## 2019-11-19 DIAGNOSIS — Z01.818 PREOP GENERAL PHYSICAL EXAM: Primary | ICD-10-CM

## 2019-11-19 DIAGNOSIS — N97.9 FEMALE INFERTILITY: ICD-10-CM

## 2019-11-19 DIAGNOSIS — E03.8 SUBCLINICAL HYPOTHYROIDISM: ICD-10-CM

## 2019-11-19 DIAGNOSIS — D50.9 IRON DEFICIENCY ANEMIA, UNSPECIFIED IRON DEFICIENCY ANEMIA TYPE: ICD-10-CM

## 2019-11-19 LAB
ERYTHROCYTE [DISTWIDTH] IN BLOOD BY AUTOMATED COUNT: 20.5 % (ref 10–15)
HCG UR QL: NEGATIVE
HCT VFR BLD AUTO: 33.7 % (ref 35–47)
HGB BLD-MCNC: 9.9 G/DL (ref 11.7–15.7)
MCH RBC QN AUTO: 19.7 PG (ref 26.5–33)
MCHC RBC AUTO-ENTMCNC: 29.4 G/DL (ref 31.5–36.5)
MCV RBC AUTO: 67 FL (ref 78–100)
PLATELET # BLD AUTO: 329 10E9/L (ref 150–450)
RBC # BLD AUTO: 5.02 10E12/L (ref 3.8–5.2)
RETICS # AUTO: 53.5 10E9/L (ref 25–95)
RETICS/RBC NFR AUTO: 1.1 % (ref 0.5–2)
TSH SERPL DL<=0.005 MIU/L-ACNC: 2.52 MU/L (ref 0.4–4)
WBC # BLD AUTO: 4.8 10E9/L (ref 4–11)

## 2019-11-19 PROCEDURE — 36415 COLL VENOUS BLD VENIPUNCTURE: CPT | Performed by: FAMILY MEDICINE

## 2019-11-19 PROCEDURE — 82728 ASSAY OF FERRITIN: CPT | Performed by: OBSTETRICS & GYNECOLOGY

## 2019-11-19 PROCEDURE — 85045 AUTOMATED RETICULOCYTE COUNT: CPT | Performed by: OBSTETRICS & GYNECOLOGY

## 2019-11-19 PROCEDURE — 81025 URINE PREGNANCY TEST: CPT | Performed by: FAMILY MEDICINE

## 2019-11-19 PROCEDURE — 83550 IRON BINDING TEST: CPT | Performed by: OBSTETRICS & GYNECOLOGY

## 2019-11-19 PROCEDURE — 99214 OFFICE O/P EST MOD 30 MIN: CPT | Performed by: FAMILY MEDICINE

## 2019-11-19 PROCEDURE — 83540 ASSAY OF IRON: CPT | Performed by: OBSTETRICS & GYNECOLOGY

## 2019-11-19 PROCEDURE — 85027 COMPLETE CBC AUTOMATED: CPT | Performed by: OBSTETRICS & GYNECOLOGY

## 2019-11-19 PROCEDURE — 84443 ASSAY THYROID STIM HORMONE: CPT | Performed by: FAMILY MEDICINE

## 2019-11-19 ASSESSMENT — MIFFLIN-ST. JEOR: SCORE: 1449.26

## 2019-11-19 NOTE — PROGRESS NOTES
[address] [id number] [phone]  Dept: [phone]    PRE-OP EVALUATION:  Today's date: 2019    Ines Gaitan (: 1980) presents for pre-operative evaluation assessment as requested by Dr. Belle.  She requires evaluation and anesthesia risk assessment prior to undergoing surgery/procedure for treatment of Left Phalopeon Tube Removal .    Fax number for surgical facility: [facility]   Primary Physician: Guy Paulson  Type of Anesthesia Anticipated: General    Patient has a Health Care Directive or Living Will:  NO    Preop Questions 2019   Who is doing your surgery? dr ramirez belle    laparoscopy with left salpingectomy,   Date of Surgery/Procedure: 2019   Facility or Hospital where procedure/surgery will be performed: [facility]   1.  Do you have a history of Heart attack, stroke, stent, coronary bypass surgery, or other heart surgery? No   2.  Do you ever have any pain or discomfort in your chest? No   3.  Do you have a history of  Heart Failure? No   4.   Are you troubled by shortness of breath when:  walking on a level surface, or up a slight hill, or at night? YES - with anem   5.  Do you currently have a cold, bronchitis or other respiratory infection? No   6.  Do you have a cough, shortness of breath, or wheezing? No   7.  Do you sometimes get pains in the calves of your legs when you walk? No   8. Do you or anyone in your family have previous history of blood clots? No   9.  Do you or does anyone in your family have a serious bleeding problem such as prolonged bleeding following surgeries or cuts? No   10. Have you ever had problems with anemia or been told to take iron pills? YES - iron deg anemia chronic, not taking daily iron   11. Have you had any abnormal blood loss such as black, tarry or bloody stools, or abnormal vaginal bleeding? No   12. Have you ever had a blood  transfusion? No   13. Have you or any of your relatives ever had problems with anesthesia? No   14. Do you have sleep apnea, excessive snoring or daytime drowsiness? UNKNOWN - has seen sleep study, she will set up formal sleep study   15. Do you have any prosthetic heart valves? No   16. Do you have prosthetic joints? No   17. Is there any chance that you may be pregnant? unknown         HPI:     HPI related to upcoming procedure: patient with history of fertility issues.  Has been seeing IFV provider. She was told she would need to have left tube removed due to inflammation and blockage. [facility] is her IVF clinic.   She is off ocp now.  She has anemia -has not taken for the last few week  Not heavy periods, not bleeding, continuous OCP  Hypothyroidism-she is seeing endocrine, she is taking taking med daily, no symptoms    Last month period        MEDICAL HISTORY:     Patient Active Problem List    Diagnosis Date Noted     Subclinical hypothyroidism 06/05/2019     Priority: Medium     Female infertility 06/05/2019     Priority: Medium     Iron deficiency anemia, unspecified iron deficiency anemia type 09/02/2016     Priority: Medium     Pain in thoracic spine 09/16/2015     Priority: Medium     Vitamin D deficiency 11/07/2014     Priority: Medium     Chronic constipation 05/27/2014     Priority: Medium     CARDIOVASCULAR SCREENING; LDL GOAL LESS THAN 160 05/09/2014     Priority: Medium     Class 1 obesity due to excess calories without serious comorbidity with body mass index (BMI) of 32.0 to 32.9 in adult 11/12/2019     Priority: Low      Past Medical History:   Diagnosis Date     ASCUS of cervix with negative high risk HPV 05/2014    neg HPV. Plan cotest in 3 years.     MEDICAL HISTORY OF - 2006    infection of L5-S1.  ?abscess Treatment in [place]     Subclinical hyperthyroidism 2016    resolved; had positive TSI and normal thyroid ultrasound     Tuberculosis 2007     Past Surgical History:   Procedure Laterality Date  "    HC TREATMENT MISSED  SURG, 1ST TRIMESTER  2016     Current Outpatient Medications   Medication Sig Dispense Refill     aspirin 81 MG EC tablet Take 81 mg by mouth daily       estradiol (ESTRACE) 2 MG tablet        ferrous sulfate ER (SLO-FE) 142 (45 Fe) MG CR tablet Take 1 tablet (142 mg) by mouth daily 90 tablet 1     GANIRELIX ACETATE SC        JULEBER 0.15-30 MG-MCG tablet        levothyroxine (SYNTHROID/LEVOTHROID) 25 MCG tablet        menotropins (MENOPUR) 75 units SC SOLR Inject Subcutaneous daily       Prenatal MV-Min-Fe Fum-FA-DHA (PRENATAL 1 PO)        vitamin D2 (ERGOCALCIFEROL) 25851 units (1250 mcg) capsule Take 1 capsule (50,000 Units) by mouth once a week 10 capsule 0     zolpidem (AMBIEN) 5 MG tablet Take tablet by mouth 15 minutes prior to sleep, for Sleep Study 1 tablet 0     OTC products: None, except as noted above    No Known Allergies   Latex Allergy: NO    Social History     Tobacco Use     Smoking status: Never Smoker     Smokeless tobacco: Never Used   Substance Use Topics     Alcohol use: No     History   Drug Use No       REVIEW OF SYSTEMS:   Constitutional, neuro, ENT, endocrine, pulmonary, cardiac, gastrointestinal, genitourinary, musculoskeletal, integument and psychiatric systems are negative, except as otherwise noted.    EXAM:   /72   Pulse 88   Temp 98  F (36.7  C) (Oral)   Ht 1.575 m (5' 2\")   Wt 82.1 kg (181 lb)   LMP 10/20/2019 (Exact Date)   SpO2 100%   BMI 33.11 kg/m      GENERAL APPEARANCE: healthy, alert and no distress     EYES: EOMI, PERRL     HENT: ear canals and TM's normal and nose and mouth without ulcers or lesions     NECK: no adenopathy, no asymmetry, masses, or scars and thyroid normal to palpation     RESP: lungs clear to auscultation - no rales, rhonchi or wheezes     CV: regular rates and rhythm, normal S1 S2, no S3 or S4 and no murmur, click or rub     ABDOMEN:  soft, nontender, no HSM or masses and bowel sounds normal     MS: extremities " normal- no gross deformities noted, no evidence of inflammation in joints, FROM in all extremities.     SKIN: no suspicious lesions or rashes     NEURO: Normal strength and tone, sensory exam grossly normal, mentation intact and speech normal     PSYCH: mentation appears normal. and affect normal/bright     LYMPHATICS: No cervical adenopathy    DIAGNOSTICS:   EKG: Not indicated due to non-vascular surgery and low risk of event (age <65 and without cardiac risk factors)    Recent Labs   Lab Test 03/04/19  1155 07/31/17  1512   HGB 9.3* 8.2*    299    139   POTASSIUM 4.4 3.8   CR 0.78 0.79   A1C 5.5  --       Results for orders placed or performed in visit on 11/19/19   Reticulocyte count     Status: None   Result Value Ref Range    % Retic 1.1 0.5 - 2.0 %    Absolute Retic 53.5 25 - 95 10e9/L   Iron and iron binding capacity     Status: Abnormal   Result Value Ref Range    Iron 19 (L) 35 - 180 ug/dL    Iron Binding Cap 519 (H) 240 - 430 ug/dL    Iron Saturation Index 4 (L) 15 - 46 %   Ferritin     Status: Abnormal   Result Value Ref Range    Ferritin 4 (L) 12 - 150 ng/mL   CBC with platelets     Status: Abnormal   Result Value Ref Range    WBC 4.8 4.0 - 11.0 10e9/L    RBC Count 5.02 3.8 - 5.2 10e12/L    Hemoglobin 9.9 (L) 11.7 - 15.7 g/dL    Hematocrit 33.7 (L) 35.0 - 47.0 %    MCV 67 (L) 78 - 100 fl    MCH 19.7 (L) 26.5 - 33.0 pg    MCHC 29.4 (L) 31.5 - 36.5 g/dL    RDW 20.5 (H) 10.0 - 15.0 %    Platelet Count 329 150 - 450 10e9/L   Results for orders placed or performed in visit on 11/19/19   HCG Qual, Urine ([id number])     Status: None   Result Value Ref Range    HCG Qual Urine Negative NEG^Negative   TSH with free T4 reflex     Status: None   Result Value Ref Range    TSH 2.52 0.40 - 4.00 mU/L       IMPRESSION:   Reason for surgery/procedure:laparoscopy with left salpingectomy,  Diagnosis/reason for consult: preoperative consult    The proposed surgical procedure is considered LOW risk.    REVISED  CARDIAC RISK INDEX  The patient has the following serious cardiovascular risks for perioperative complications such as (MI, PE, VFib and 3  AV Block):  No serious cardiac risks  INTERPRETATION: 0 risks: Class I (very low risk - 0.4% complication rate)    The patient has the following additional risks for perioperative complications:  No identified additional risks  The ASCVD Risk score (Evonne MONTALVO Jr., et al., 2013) failed to calculate for the following reasons:    The 2013 ASCVD risk score is only valid for ages 40 to 79      ICD-10-CM    1. Preop general physical exam Z01.818    2. Female infertility N97.9 HCG Qual, Urine ([id number])     CANCELED: Beta HCG qual IFA urine   3. Iron deficiency anemia, unspecified iron deficiency anemia type D50.9    4. Subclinical hypothyroidism E03.9 TSH with free T4 reflex   hypothyroidism-stable, cont meds, labs stable  Anemia-Anemia -9.9 today, much improved from previously on oral iron every 3 days with vit C. Will refer back to surgeon to see if needing IV iron infusion.  From my perspective does not require treatment prior to surgery.  Monitor Hemoglobin postoperatively.      RECOMMENDATIONS:     --Consult hospital rounder / IM to assist post-op medical management    Anemia  Anemia -almost 10 today, much improved from previously on oral iron every 3 days with vit C. Will refer back to surgeon to see if needing IV iron infusion.  From my perspective does not require treatment prior to surgery.  Monitor Hemoglobin postoperatively.      --Patient is to take all scheduled medications on the day of surgery EXCEPT for modifications listed below.    APPROVAL GIVEN to proceed with proposed procedure, without further diagnostic evaluation       Signed Electronically by: Guy Paulson DO    Copy of this evaluation report is provided to requesting physician.    [facility] Preop Guidelines    Revised Cardiac Risk Index

## 2019-11-19 NOTE — LETTER
[address] [id number] [phone]                                                                                                November 29, 2019    Ines Arnie  [address] [address] [facility]  [place] [address]        Dear Ms. Gaitan,    Your recent lab results were within normal limits. A copy of those results are included with this letter.        Sincerely,      Guy Paulson, DO/hl    Results for orders placed or performed in visit on 11/19/19   Reticulocyte count     Status: None   Result Value Ref Range    % Retic 1.1 0.5 - 2.0 %    Absolute Retic 53.5 25 - 95 10e9/L   Iron and iron binding capacity     Status: Abnormal   Result Value Ref Range    Iron 19 (L) 35 - 180 ug/dL    Iron Binding Cap 519 (H) 240 - 430 ug/dL    Iron Saturation Index 4 (L) 15 - 46 %   Ferritin     Status: Abnormal   Result Value Ref Range    Ferritin 4 (L) 12 - 150 ng/mL   CBC with platelets     Status: Abnormal   Result Value Ref Range    WBC 4.8 4.0 - 11.0 10e9/L    RBC Count 5.02 3.8 - 5.2 10e12/L    Hemoglobin 9.9 (L) 11.7 - 15.7 g/dL    Hematocrit 33.7 (L) 35.0 - 47.0 %    MCV 67 (L) 78 - 100 fl    MCH 19.7 (L) 26.5 - 33.0 pg    MCHC 29.4 (L) 31.5 - 36.5 g/dL    RDW 20.5 (H) 10.0 - 15.0 %    Platelet Count 329 150 - 450 10e9/L   Results for orders placed or performed in visit on 11/19/19   HCG Qual, Urine ([id number])     Status: None   Result Value Ref Range    HCG Qual Urine Negative NEG^Negative   TSH with free T4 reflex     Status: None   Result Value Ref Range    TSH 2.52 0.40 - 4.00 mU/L

## 2019-11-19 NOTE — PATIENT INSTRUCTIONS
Your hemoglobin is better  Continue iron every 3 days with vit c and oj juice  Will send a message to your surgeon to see if he wants you to be set up for infusion  Take care    Before Your Surgery      Call your surgeon if there is any change in your health. This includes signs of a cold or flu (such as a sore throat, runny nose, cough, rash or fever).    Do not smoke, drink alcohol or take over the counter medicine (unless your surgeon or primary care doctor tells you to) for the 24 hours before and after surgery.    If you take prescribed drugs: Follow your doctor s orders about which medicines to take and which to stop until after surgery.    Eating and drinking prior to surgery: follow the instructions from your surgeon    Take a shower or bath the night before surgery. Use the soap your surgeon gave you to gently clean your skin. If you do not have soap from your surgeon, use your regular soap. Do not shave or scrub the surgery site.  Wear clean pajamas and have clean sheets on your bed.

## 2019-11-19 NOTE — TELEPHONE ENCOUNTER
"Patient is scheduled for surgery on 11/27/2019-   Reason for Admit:  Left Hydrosalpinx, possible right Hydrosalpinx,  Surgeon: Quinten Belle MD  Surgical Procedure: Laparoscopy, left salpingectomy, possible tubal dye study  ICD-9 Coded: N70.11 and N97.9  Date of Surgery: 11/25/19  Consent [id number]? N/A      [place] [facility] [place]    Patient had a perop exam today with FP. Some labs are resulted but have no yet been interpreted by provider, other results are still \"in process\". OV note from today is not yet complete.     RN will wait for completion of OV note from preop and labs to call back patient.     Regi Barnett RN on 11/19/2019 at 12:46 PM    "

## 2019-11-19 NOTE — TELEPHONE ENCOUNTER
Reason for call:  Other   Patient called regarding (reason for call): Patient would like more information on why her surgery was cancelled  Additional comments: Please call back    Phone number to reach patient:  Home number on file [phone] (home)    Best Time:  Any    Can we leave a detailed message on this number?  YES

## 2019-11-19 NOTE — Clinical Note
I saw our mutual patient today, labs showing hgb 9.9.  Discussed with out obgyn and they said that if close to 10 no need for aggressive managementPlease let us know if ok for surgery.Saud

## 2019-11-20 LAB
FERRITIN SERPL-MCNC: 4 NG/ML (ref 12–150)
IRON SATN MFR SERPL: 4 % (ref 15–46)
IRON SERPL-MCNC: 19 UG/DL (ref 35–180)
TIBC SERPL-MCNC: 519 UG/DL (ref 240–430)

## 2019-11-20 RX ORDER — HEPARIN SODIUM (PORCINE) LOCK FLUSH IV SOLN 100 UNIT/ML 100 UNIT/ML
5 SOLUTION INTRAVENOUS
Status: CANCELLED | OUTPATIENT
Start: 2019-11-20

## 2019-11-20 RX ORDER — HEPARIN SODIUM,PORCINE 10 UNIT/ML
5 VIAL (ML) INTRAVENOUS
Status: CANCELLED | OUTPATIENT
Start: 2019-11-20

## 2019-11-20 NOTE — TELEPHONE ENCOUNTER
I spoke to patient.  Surgery has not been cancelled.  Surgery is still on the 25th at [facility].  What was cancelled was it on the 27th at the [facility].  I have paged Dr. Belle regarding patients HGB.  Not sure if patient will need an iron infusion prior to surgery.  Patient verbalized understanding.    Haylee Jovel CMA  November 20, 2019 10:29 AM

## 2019-11-20 NOTE — TELEPHONE ENCOUNTER
"Patient is calling today because yesterday she had her pre-op exam for her scheduled procedure on 11/27/2019. Patient states she was told the procedure was cancelled before her examination began.     Per patient report, her concern is that she was told by Dr. Belle to take oral contraceptives pills until the Nov 15th before surgery on 11/25/2019, and now has stopped. If she isn't having surgery, then she will have to go through the next cycle again.    RN is speaking to . If FP saw that the surgery was cancelled at the [facility] on 11/27/2019, they may have not seen that it was scheduled for [facility] at 11/25/2019.     RN to follow up with Dr. Belle and  before updating patient.    Still waiting for \"in process\" labs to be resulted.    Regi Barnett RN on 11/20/2019 at 10:05 AM    "

## 2019-11-20 NOTE — TELEPHONE ENCOUNTER
Dr. Belle returned my call.  Per Dr. Belle, patient would not need an iron infusion prior to surgery.  Patient is informed of this.  Patient pleased.  Haylee Jovel CMA  November 20, 2019 11:31 AM

## 2019-11-27 ENCOUNTER — TELEPHONE (OUTPATIENT)
Dept: OBGYN | Facility: CLINIC | Age: 39
End: 2019-11-27

## 2019-11-27 NOTE — TELEPHONE ENCOUNTER
I called patient with Path report. Doing well. Reviewed instructions. Patient understands Path findings and requests that the op note and Path report be faxed to Dr. Didi Neff at [facility] as planned.   Quinten Belle MD      Case Report   Surgical Pathology                                Case: [id number]                                    Authorizing Provider:  Quinten Belle MD       Collected:           11/25/2019 01:49 PM           Ordering Location:     [facility]       Received:            11/26/2019 08:33 AM                                  Operating Room                                                                Pathologist:           Eva Hong MD                                                                            Specimen:    Fallopian Tube Left, Non-Neoplastic                                                        Final Diagnosis   Left fallopian tube, salpingectomy - Fallopian tube with paratubal cysts. Negative for atypia or malignancy.   Electronically signed by Eva Hong MD on 11/27/2019 at 1426   Clinical Information    left hydrosalpinx, possible right   Gross Description    Left fallopian tube: Is a 6.0 cm in length segment of fimbriated fallopian tube with multiple paratubal cysts averaging 0.1 cm. There are no areas of dilatation. Representative sections are submitted in cassette A1.  (CM)   Microscopic Description    Performed.   Resulting Agency [facility]         Specimen Collected: 11/25/19 13:49 Last Resulted: 11/27/19 14:26        Order Details       View Encounter       Lab and Collection Details       Routing       Result History            Scans on Order [id number]     Document on 11/27/2019  2:26 PM by Eva Hong MD

## 2019-11-27 NOTE — TELEPHONE ENCOUNTER
Reason for Call:  Other call back    Detailed comments: Ines had a procedure done by Dr Belle on 11/225 and had stitches placed. She has some after care questions. Does not remember what was told to her by the provider for after care. Please call to advise. Thank you    Phone Number Patient can be reached at: Home number on file [phone] (home)    Best Time: Any    Can we leave a detailed message on this number? YES    Call taken on 11/27/2019 at 1:36 PM by Carmen Storey

## 2019-11-27 NOTE — TELEPHONE ENCOUNTER
Spoke with pt. She was wondering if she could shower and take off her bandages.  She had surgery on 11/25/19 and has 3 incisions on her belly.  I told her she can take a shower now but do not scrub incisions and make sure they are completely dried when she gets out of shower, pat dry do not rub.  I told her she can take off bandaids and replace with dry bandaids.  I advised if there are steri strips or glue to not pick at those things.  I advised against taking a bath or submerging her incisions in water.  I gave her signs and symptoms of incisional infection to keep an eye for.    Pt verbalized understanding and agreed to plan.    Lola Jarrell RN

## 2020-01-10 ENCOUNTER — TELEPHONE (OUTPATIENT)
Dept: OBGYN | Facility: CLINIC | Age: 40
End: 2020-01-10

## 2020-01-10 NOTE — TELEPHONE ENCOUNTER
Called and left a VM. The information being asked for is from the hospital PT will have to contact them.  Michelle Johnson CMA

## 2020-01-10 NOTE — TELEPHONE ENCOUNTER
[facility] Call Center    Phone Message    May a detailed message be left on voicemail: no    Reason for Call: Requesting Results   Name/type of test: Biopsy results  Date of test: 11.25.19    Fax:  [phone]  Attn: Goldie    Was test done at a location other than [facility] (Please fill in the location if not [facility])?: No      Action Taken: Message routed to:  [facility] p [phone]

## 2020-02-17 ENCOUNTER — TRANSFERRED RECORDS (OUTPATIENT)
Dept: HEALTH INFORMATION MANAGEMENT | Facility: CLINIC | Age: 40
End: 2020-02-17

## 2020-03-02 ENCOUNTER — HEALTH MAINTENANCE LETTER (OUTPATIENT)
Age: 40
End: 2020-03-02

## 2020-03-06 ENCOUNTER — TRANSFERRED RECORDS (OUTPATIENT)
Dept: HEALTH INFORMATION MANAGEMENT | Facility: CLINIC | Age: 40
End: 2020-03-06

## 2020-03-13 ENCOUNTER — TELEPHONE (OUTPATIENT)
Dept: FAMILY MEDICINE | Facility: CLINIC | Age: 40
End: 2020-03-13

## 2020-03-13 ENCOUNTER — OFFICE VISIT (OUTPATIENT)
Dept: FAMILY MEDICINE | Facility: CLINIC | Age: 40
End: 2020-03-13
Payer: COMMERCIAL

## 2020-03-13 VITALS
HEART RATE: 93 BPM | TEMPERATURE: 98.4 F | HEIGHT: 62 IN | OXYGEN SATURATION: 98 % | SYSTOLIC BLOOD PRESSURE: 124 MMHG | RESPIRATION RATE: 16 BRPM | DIASTOLIC BLOOD PRESSURE: 86 MMHG | WEIGHT: 184.4 LBS | BODY MASS INDEX: 33.93 KG/M2

## 2020-03-13 DIAGNOSIS — R06.2 WHEEZING: Primary | ICD-10-CM

## 2020-03-13 DIAGNOSIS — Z98.890 HISTORY OF IN VITRO FERTILIZATION: ICD-10-CM

## 2020-03-13 PROCEDURE — 99214 OFFICE O/P EST MOD 30 MIN: CPT | Performed by: PHYSICIAN ASSISTANT

## 2020-03-13 RX ORDER — ALBUTEROL SULFATE 90 UG/1
2 AEROSOL, METERED RESPIRATORY (INHALATION) EVERY 6 HOURS
Qty: 1 INHALER | Refills: 0 | Status: SHIPPED | OUTPATIENT
Start: 2020-03-13 | End: 2021-12-07

## 2020-03-13 ASSESSMENT — MIFFLIN-ST. JEOR: SCORE: 1457.3

## 2020-03-13 NOTE — TELEPHONE ENCOUNTER
Patient feeling wheezy for 6 weeks. Began when she was in [place], [facility]. Flew through [place] on the way to [facility] on 2/13. No worsening of symptoms. No body aches, fever or other symptoms. Schedule for IVF bloodwork in 3 days.     OK to amaya in same day care.    JOSE ANTONIO Hull

## 2020-03-13 NOTE — PROGRESS NOTES
Subjective     Ines Gaitan is a 39 year old female who presents to clinic today for the following health issues:    HPI   No history of pulmonary disease. No tobacco use. No flu shot this season. Stable symptoms.  She is mainly seeking evaluation today as she finds out if she is pregnant via IVF in 3 days and fertility clinic.  She traveled to [facility] via [employer] earlier in the year and returned by the same route on 2020.  Her wheezing symptoms developed while she was in [place].  No ill contacts.    Acute Illness   Acute illness concerns: chest congestion, wheezing  Onset: 6 weeks now    Fever: no     Chills/Sweats: no     Headache (location?): no     Sinus Pressure:no    Conjunctivitis:  no    Ear Pain: no    Rhinorrhea: no     Congestion: YES- chest and throat    Sore Throat: no      Cough: no    Wheeze: YES    Decreased Appetite: no     Nausea: no     Vomiting: no     Diarrhea:  no     Dysuria/Freq.: no     Fatigue/Achiness: no     Sick/Strep Exposure: YES-  has a cold     Therapies Tried and outcome: herbs (boiling basal leaves and brenda); soothing, not for long    Patient Active Problem List   Diagnosis     CARDIOVASCULAR SCREENING; LDL GOAL LESS THAN 160     Chronic constipation     Vitamin D deficiency     Pain in thoracic spine     Iron deficiency anemia, unspecified iron deficiency anemia type     Subclinical hypothyroidism     Female infertility     Class 1 obesity due to excess calories without serious comorbidity with body mass index (BMI) of 32.0 to 32.9 in adult     Past Surgical History:   Procedure Laterality Date     HC TREATMENT MISSED  SURG, 1ST TRIMESTER  2016     LAPAROSCOPIC SALPINGECTOMY Left 2019    small hydrosalpinx       Social History     Tobacco Use     Smoking status: Never Smoker     Smokeless tobacco: Never Used   Substance Use Topics     Alcohol use: No     Family History   Problem Relation Age of Onset     Hypertension Mother      Diabetes Father       Cerebrovascular Disease Father      Heart Surgery Father      Thyroid Disease Sister      Cancer Maternal Grandmother         liver     Macular Degeneration No family hx of      Glaucoma No family hx of          Current Outpatient Medications   Medication Sig Dispense Refill     albuterol (PROAIR HFA/PROVENTIL HFA/VENTOLIN HFA) 108 (90 Base) MCG/ACT inhaler Inhale 2 puffs into the lungs every 6 hours 1 Inhaler 0     aspirin 81 MG EC tablet Take 81 mg by mouth daily       estradiol (ESTRACE) 2 MG tablet        ferrous sulfate ER (SLO-FE) 142 (45 Fe) MG CR tablet Take 1 tablet (142 mg) by mouth daily 90 tablet 1     levothyroxine (SYNTHROID/LEVOTHROID) 25 MCG tablet        Prenatal MV-Min-Fe Fum-FA-DHA (PRENATAL 1 PO)        UNABLE TO FIND MEDICATION NAME: Progesterone injection       UNABLE TO FIND MEDICATION NAME: Progesterone vag caps       vitamin D2 (ERGOCALCIFEROL) 83407 units (1250 mcg) capsule Take 1 capsule (50,000 Units) by mouth once a week 10 capsule 0     No Known Allergies    Review of Systems   ROS COMP: Constitutional, HEENT, cardiovascular, pulmonary, gi and gu systems are negative, except as otherwise noted.      Objective    There were no vitals taken for this visit.  There is no height or weight on file to calculate BMI.  Physical Exam  Constitutional:       General: She is not in acute distress.     Appearance: She is not ill-appearing or diaphoretic.   HENT:      Head: Normocephalic and atraumatic.      Mouth/Throat:      Mouth: Mucous membranes are moist.   Eyes:      Conjunctiva/sclera: Conjunctivae normal.   Cardiovascular:      Rate and Rhythm: Normal rate and regular rhythm.      Heart sounds: Normal heart sounds. No murmur. No friction rub. No gallop.    Pulmonary:      Effort: Pulmonary effort is normal. No respiratory distress.      Breath sounds: No stridor. Wheezing (bilaterally) present. No rhonchi or rales.   Abdominal:      General: Abdomen is flat. Bowel sounds are normal. There is  no distension.      Palpations: Abdomen is soft. There is no mass.      Tenderness: There is no abdominal tenderness.      Hernia: No hernia is present.   Musculoskeletal:      Comments: No lower extremity edema or tenderness.   Skin:     General: Skin is warm and dry.   Neurological:      General: No focal deficit present.      Mental Status: She is alert. Mental status is at baseline.   Psychiatric:         Mood and Affect: Mood normal.         Behavior: Behavior normal.          Assessment & Plan   Patient is a 39-year-old female who is currently undergoing treatment for IVF and finds out in 3 days whether she is pregnant who now presents for evaluation of 6 weeks of wheezing.  Wheezing began while she was in [facility] and has been stable since that time.  She has a history of childhood bronchospasm per her report.  Impression is likely bronchospasm due to reactive airway from viral illness or seasonal allergies.  The patient appears well and nontoxic and I have low suspicion for impending respiratory distress.  We did discuss the possibility of pulmonary embolism given her long plane rides and use of estrogen, however she has no chest discomfort, lower extremity edema or tenderness or other risk factors.  She is neither hypoxic nor tachycardic.  Her breath sounds are otherwise normal aside from the wheezing and she has no fever.  Low suspicion for pneumonia, pulmonary edema or pneumothorax.  She wishes not pursue any work-up at this time and declined a chest x-ray and albuterol nebulizer here.  She would prefer to discuss albuterol use with her fertility provider and if she is not pregnant in 3 days, she will return for a chest x-ray and to address this with me or her primary care provider.  She agreed to go to the emergency department should she develop worsening/changing symptoms at any time.  Outpatient x-ray as well as albuterol inhaler ordered.    Complete history and physical exam as above. AF with  normal VS    DDx and Dx discussed with and explained to the pt to their satisfaction.  All questions were answered at this time. Pt expressed understanding of and agreement with this dx, tx, and plan. No further workup warranted and standard medication warnings given. I have given the patient a list of pertinent indications for re-evaluation. Will go to the Emergency Department if symptoms worsen or new concerning symptoms arise. Patient left in no apparent distress.     ICD-10-CM    1. Wheezing  R06.2 albuterol (PROAIR HFA/PROVENTIL HFA/VENTOLIN HFA) 108 (90 Base) MCG/ACT inhaler     XR Chest 2 Views   2. History of in vitro fertilization  Z98.890        See Patient Instructions    Return in about 1 week (around 3/20/2020) for a recheck of your symptoms if not improving.    JOSE ANTONIO Hull  [facility]

## 2020-03-13 NOTE — PATIENT INSTRUCTIONS
Autumn Chacon,    Thank you for allowing [facility] to manage your care.    You do have wheezing on your exam and I feel it is reasonable to treat you with an albuterol inhaler and obtain an x-ray, but I understand that you would like to discuss this with your fertility provider in 3 days.     Please see your primary care provider if you are not improving in the next 1-2 weeks and go to the emergency department if you develop worsening/changing symptoms.    I sent your prescriptions to your pharmacy.    I ordered a chest xray if you would like this in the future, please call diagnostic imaging [phone] to schedule your test.    If you have any questions or concerns, please feel free to call us at [phone]    Sincerely,    Alex Sabillon PA-C    Did you know?  You can schedule an e-Visit for certain simple non-emergent issue for your convenience.  To learn more about or start an eVisit, simply login to [employer], click  Visits  on top banner, click  Start a Virtual Visit  drop down, and click  Symptom-Specific E-Visit     Patient Education     Bronchospasm (Adult)    Bronchospasm occurs when the airways (bronchial tubes) go into spasm and contract. This makes it hard to breathe and causes wheezing (a high-pitched whistling sound). Bronchospasm can also cause frequent coughing without wheezing.  Bronchospasm is due to irritation, inflammation, or allergic reaction of the airways. People with asthma get bronchospasm. However, not everyone with bronchospasm has asthma.  Being exposed to harmful fumes, a recent case of bronchitis, exercise, or a flare-up of chronic obstructive pulmonary disease (COPD) may cause the airways to spasm. An episode of bronchospasm may last 7 to 14 days. Medicine may be prescribed to relax the airways and prevent wheezing. Antibiotics will be prescribed only if your healthcare provider thinks there is a bacterial infection. Antibiotics do not help a viral infection.  Home  care    Drink lots of water or other fluids (at least 10 glasses a day) during an attack. This will loosen lung secretions and make it easier to breathe. If you have heart or kidney disease, check with your doctor before you drink extra fluids.    Take prescribed medicine exactly at the times advised. If you take an inhaled medicine to help with breathing, don't use it more than once every 4 hours, unless told to do so. If prescribed an antibiotic or prednisone, take all of the medicine, even if you are feeling better after a few days.    Don't smoke. Also avoid being exposed to secondhand smoke.    If you were given an inhaler, use it exactly as directed. If you need to use it more often than prescribed, your condition may be getting worse. Contact your healthcare provider.  Follow-up care  Follow up with your healthcare provider, or as advised.  If you are age 65 or older, have a chronic lung disease or condition that affects your immune system, or you smoke, ask your healthcare provider about getting a pneumococcal vaccine, as well as a yearly flu shot (influenza vaccine).  When to seek medical advice  Call your healthcare provider right away if any of these occur:    You need to use your inhalers more often than usual    Fever of 100.4 F (38 C) or higher, or as directed by your healthcare provider    Cough that brings up lots of dark-colored sputum (mucus)    You don't get better within 24 hours  Call 911  Call 911 if any of these occur:    Coughing up bloody sputum (mucus)    Chest pain with each breath    Increased wheezing or shortness of breath  Date Last Reviewed: 6/1/2018 2000-2019 [employer]. [address] [place] [address], [place] [address]. All rights reserved. This information is not intended as a substitute for professional medical care. Always follow your healthcare professional's instructions.

## 2020-03-30 ENCOUNTER — TRANSFERRED RECORDS (OUTPATIENT)
Dept: HEALTH INFORMATION MANAGEMENT | Facility: CLINIC | Age: 40
End: 2020-03-30

## 2020-05-06 ENCOUNTER — OFFICE VISIT (OUTPATIENT)
Dept: OBGYN | Facility: CLINIC | Age: 40
End: 2020-05-06
Payer: COMMERCIAL

## 2020-05-06 VITALS
BODY MASS INDEX: 34.31 KG/M2 | OXYGEN SATURATION: 100 % | WEIGHT: 186.7 LBS | DIASTOLIC BLOOD PRESSURE: 89 MMHG | HEART RATE: 90 BPM | SYSTOLIC BLOOD PRESSURE: 132 MMHG

## 2020-05-06 DIAGNOSIS — N76.0 BV (BACTERIAL VAGINOSIS): ICD-10-CM

## 2020-05-06 DIAGNOSIS — N89.8 VAGINAL DISCHARGE: ICD-10-CM

## 2020-05-06 DIAGNOSIS — B96.89 BV (BACTERIAL VAGINOSIS): ICD-10-CM

## 2020-05-06 DIAGNOSIS — R30.0 DYSURIA: Primary | ICD-10-CM

## 2020-05-06 LAB
ALBUMIN UR-MCNC: NEGATIVE MG/DL
APPEARANCE UR: CLEAR
BILIRUB UR QL STRIP: NEGATIVE
COLOR UR AUTO: NORMAL
GLUCOSE UR STRIP-MCNC: NEGATIVE MG/DL
HGB UR QL STRIP: NEGATIVE
KETONES UR STRIP-MCNC: NEGATIVE MG/DL
LEUKOCYTE ESTERASE UR QL STRIP: NEGATIVE
NITRATE UR QL: NEGATIVE
PH UR STRIP: 5.5 PH (ref 5–7)
SOURCE: NORMAL
SP GR UR STRIP: 1.02 (ref 1–1.03)
SPECIMEN SOURCE: ABNORMAL
UROBILINOGEN UR STRIP-MCNC: NORMAL MG/DL (ref 0–2)
WET PREP SPEC: ABNORMAL

## 2020-05-06 PROCEDURE — 87210 SMEAR WET MOUNT SALINE/INK: CPT | Performed by: OBSTETRICS & GYNECOLOGY

## 2020-05-06 PROCEDURE — 99213 OFFICE O/P EST LOW 20 MIN: CPT | Performed by: OBSTETRICS & GYNECOLOGY

## 2020-05-06 PROCEDURE — 81003 URINALYSIS AUTO W/O SCOPE: CPT | Performed by: OBSTETRICS & GYNECOLOGY

## 2020-05-06 RX ORDER — METRONIDAZOLE 500 MG/1
500 TABLET ORAL 2 TIMES DAILY
Qty: 14 TABLET | Refills: 0 | Status: SHIPPED | OUTPATIENT
Start: 2020-05-06 | End: 2020-07-15

## 2020-05-06 NOTE — PROGRESS NOTES
This 38 y/o female, , presents c/o vaginal irritation and odor, even after completing a 1-day course of po Monistat followed by a 3-day course of vaginal anti-fungal cream.  She feels that these two treatment have actually made her symptoms worse so needs a wet prep collected and submitted to lab.  She denies any STD exposure so declines testing.  She also has noted light spotting with urination so would like to be screened for a possible UTI.  She denies any fever or flank pain.  /89   Pulse 90   Wt 84.7 kg (186 lb 11.2 oz)   SpO2 100%   BMI 34.31 kg/m    ROS:  10 systems were reviewed and the positives were listed under problems.  A bi-valve speculum was placed and a wet prep swab was used to collect the discharge.  There was a small amount of frothy white vaginal discharge but no vaginal lesions or growths.  She has no CVAT bilaterally.  Assessment - Abnormal vaginal discharge with irritation and odor, hematuria  Plan - Submit the wet prep and treat if +.  Her UA results are negative and not even occult blood is noted.  F/u as discussed.  This was a 20-minute visit and over 50% of the time was spent in direct patient consultation.

## 2020-06-08 ENCOUNTER — VIRTUAL VISIT (OUTPATIENT)
Dept: OBGYN | Facility: CLINIC | Age: 40
End: 2020-06-08
Payer: COMMERCIAL

## 2020-06-08 ENCOUNTER — OFFICE VISIT (OUTPATIENT)
Dept: OBGYN | Facility: CLINIC | Age: 40
End: 2020-06-08
Payer: COMMERCIAL

## 2020-06-08 VITALS — BODY MASS INDEX: 34.68 KG/M2 | WEIGHT: 188.7 LBS

## 2020-06-08 DIAGNOSIS — N89.8 VAGINAL ITCHING: Primary | ICD-10-CM

## 2020-06-08 DIAGNOSIS — B37.31 YEAST INFECTION OF THE VAGINA: ICD-10-CM

## 2020-06-08 LAB
SPECIMEN SOURCE: ABNORMAL
WET PREP SPEC: ABNORMAL

## 2020-06-08 PROCEDURE — 99213 OFFICE O/P EST LOW 20 MIN: CPT | Performed by: OBSTETRICS & GYNECOLOGY

## 2020-06-08 PROCEDURE — 99207 ZZC NO BILLABLE SERVICE THIS VISIT: CPT | Performed by: OBSTETRICS & GYNECOLOGY

## 2020-06-08 PROCEDURE — 87210 SMEAR WET MOUNT SALINE/INK: CPT | Performed by: OBSTETRICS & GYNECOLOGY

## 2020-06-08 RX ORDER — FLUCONAZOLE 150 MG/1
150 TABLET ORAL ONCE
Qty: 1 TABLET | Refills: 0 | Status: SHIPPED | OUTPATIENT
Start: 2020-06-08 | End: 2020-07-15

## 2020-06-08 NOTE — PROGRESS NOTES
Ines is a 39 year old  referred here by Dr. Guadarrama after a telephone visit today for consultation regarding intense vaginal itching for 5 days.. She self medicated with OTC monistat without relief.  She is on IVF treatment and used estrace vaginally and orally daily. She experience similar itching with the treatment in 2020, but not as intense.    ROS: Ten point review of systems was reviewed and negative except the above.    Gyne: - abn pap (last pap ), - STD's    Past Medical History:   Diagnosis Date     ASCUS of cervix with negative high risk HPV 2014    neg HPV. Plan cotest in 3 years.     MEDICAL HISTORY OF -     infection of L5-S1.  ?abscess Treatment in [place]     Subclinical hyperthyroidism     resolved; had positive TSI and normal thyroid ultrasound     Tuberculosis      Past Surgical History:   Procedure Laterality Date     HC TREATMENT MISSED  SURG, 1ST TRIMESTER  2016     LAPAROSCOPIC SALPINGECTOMY Left 2019    small hydrosalpinx     Patient Active Problem List   Diagnosis     CARDIOVASCULAR SCREENING; LDL GOAL LESS THAN 160     Chronic constipation     Vitamin D deficiency     Pain in thoracic spine     Iron deficiency anemia, unspecified iron deficiency anemia type     Subclinical hypothyroidism     Female infertility     Class 1 obesity due to excess calories without serious comorbidity with body mass index (BMI) of 32.0 to 32.9 in adult       ALL/Meds: Her medication and allergy histories were reviewed and are documented in their appropriate chart areas.    SH: - tob, - EtOH,     FH: Her family history was reviewed and documented in its appropriate chart area.    PE: Wt 85.6 kg (188 lb 11.2 oz)   LMP 2020 (Exact Date)   Breastfeeding No   BMI 34.68 kg/m    Body mass index is 34.68 kg/m .    General Appearance:  healthy, alert, active, no distress  HEENT: NCAT  Abdomen: Soft, nontender.  Normal bowel sounds.  No masses  Pelvic:       - Ext: NEFG,         - Urethra: no lesions, no masses, no hypermobility       - Urethral Meatus: normal appearance,        - Bladder: no tenderness, no masses       - Vagina: pink, moist, normal rugae, no lesions, greenish discharge from vafgnal estrace.       - Cervix: no lesions, nulliparous         Results for orders placed or performed in visit on 06/08/20   Wet prep     Status: Abnormal    Specimen: Vagina   Result Value Ref Range    Specimen Description Vagina     Wet Prep WBC'S seen  Moderate       Wet Prep No Trichomonas seen     Wet Prep No clue cells seen     Wet Prep Yeast seen (A)      Nurse for exam.    A/P      ICD-10-CM    1. Vaginal itching  N89.8 Wet prep     fluconazole (DIFLUCAN) 150 MG tablet   2. Yeast infection of the vagina  B37.3 fluconazole (DIFLUCAN) 150 MG tablet     She will check with keke IVF provider before taking the diflucan presctribed.    15 minutes was spent face to face with the patient today discussing her history, diagnosis, and follow-up plan as noted above.  Over 50% of the visit was spent in counseling and coordination of care.    Total Visit Time: 20 minutes.      CEPHAS AGBEH, MD.

## 2020-06-08 NOTE — PROGRESS NOTES
"Ines Gaitan is a 39 year old female who is being evaluated via a billable telephone visit.      The patient has been notified of following:     \"This telephone visit will be conducted via a call between you and your physician/provider. We have found that certain health care needs can be provided without the need for a physical exam.  This service lets us provide the care you need with a short phone conversation.  If a prescription is necessary we can send it directly to your pharmacy.  If lab work is needed we can place an order for that and you can then stop by our lab to have the test done at a later time.    Telephone visits are billed at different rates depending on your insurance coverage. During this emergency period, for some insurers they may be billed the same as an in-person visit.  Please reach out to your insurance provider with any questions.    If during the course of the call the physician/provider feels a telephone visit is not appropriate, you will not be charged for this service.\"    Patient has given verbal consent for Telephone visit?  Yes    What phone number would you like to be contacted at? [phone]    How would you like to obtain your AVS? Emiliano     This 38 y/o female, , c/o perineal itching which is intense enough, at times, to wake her up from sleep.  She has not noted any vaginal discharge or internal itching.  She was diagnosed with bacterial vaginosis in May 2020 but admits to only taking 4 Flagyl tablets since she dislikes taking any type of medication.  She felt \"cured\" after this 2-day treatment but now is questioning if she has a new infection.  She is not at risk for STD exposure.  She is undergoing infertility treatment and was recently started on Estrace tablets - takes orally and intravaginally.  ROS:  10 systems were reviewed and the positives were listed under problems.    Assessment:  Perineal itching    Plan:  I advised that she make a clinic appointment, " today if possible, given the intensity of the itching.  If she cannot get in, then an option would be to self-treat with a 1-day or 3-day course of OTC Monistat/vaginal anti-fungal.  However, ideally it would be best to know the source of her itching so a wet prep is needed.    Phone call duration: 5 minutes    Farzana Guadarrama DO  FACOG, FACS

## 2020-07-15 ENCOUNTER — TRANSFERRED RECORDS (OUTPATIENT)
Dept: HEALTH INFORMATION MANAGEMENT | Facility: CLINIC | Age: 40
End: 2020-07-15

## 2020-07-15 ENCOUNTER — TELEPHONE (OUTPATIENT)
Dept: FAMILY MEDICINE | Facility: CLINIC | Age: 40
End: 2020-07-15

## 2020-07-15 ENCOUNTER — VIRTUAL VISIT (OUTPATIENT)
Dept: FAMILY MEDICINE | Facility: CLINIC | Age: 40
End: 2020-07-15
Payer: COMMERCIAL

## 2020-07-15 DIAGNOSIS — R20.0 NUMBNESS OF FACE: Primary | ICD-10-CM

## 2020-07-15 DIAGNOSIS — D50.9 IRON DEFICIENCY ANEMIA, UNSPECIFIED IRON DEFICIENCY ANEMIA TYPE: ICD-10-CM

## 2020-07-15 DIAGNOSIS — Z53.9 ERRONEOUS ENCOUNTER--DISREGARD: Primary | ICD-10-CM

## 2020-07-15 DIAGNOSIS — E03.8 SUBCLINICAL HYPOTHYROIDISM: ICD-10-CM

## 2020-07-15 DIAGNOSIS — E55.9 VITAMIN D DEFICIENCY: ICD-10-CM

## 2020-07-15 DIAGNOSIS — E53.8 VITAMIN B12 DEFICIENCY: ICD-10-CM

## 2020-07-15 PROCEDURE — 99214 OFFICE O/P EST MOD 30 MIN: CPT | Mod: 95 | Performed by: NURSE PRACTITIONER

## 2020-07-15 NOTE — PROGRESS NOTES
"Ines Gaitan is a 39 year old female who is being evaluated via a billable video visit.      The patient has been notified of following:     \"This video visit will be conducted via a call between you and your physician/provider. We have found that certain health care needs can be provided without the need for an in-person physical exam.  This service lets us provide the care you need with a video conversation.  If a prescription is necessary we can send it directly to your pharmacy.  If lab work is needed we can place an order for that and you can then stop by our lab to have the test done at a later time.    Video visits are billed at different rates depending on your insurance coverage.  Please reach out to your insurance provider with any questions.    If during the course of the call the physician/provider feels a video visit is not appropriate, you will not be charged for this service.\"    Patient has given verbal consent for Video visit? Yes  How would you like to obtain your AVS? MyChart  If you are dropped from the video visit, the video invite should be resent to: Text to cell phone: [phone]  Will anyone else be joining your video visit? No      Subjective     Ines Gaitan is a 39 year old female who presents today via video visit for the following health issues:    HPI     Patient would like orders for labs. TSH and Vitamin D.      Face and Scalp numbness. Sometimes it's in forehead, cheeks and nose. This has been a problem for the last two weeks. Not sure what its related to. Generally happens when she wakes up from a night of sleep or a nap. She began IVF treatment about 1 month ago. She describes the numbness further as a feeling of her \"skin stretching.\" she denies facial swelling, denies troubles with speech and swallowing. She noticed mild dizziness in the beginning but not recently. She has checked her BP and it has been okay per report. Denies recent head injury. Denies rash. She has " headaches at times (posterior and frontal areas bilat). She took tylenol once without improvement. She denies neck stiffness. She states she has some mild tenderness to her neck and upper shoulders when she presses on them. She denies radiation of her symptoms. Denies vision changes. It can last up to a few hours when it occurs, she just feels like sleeping when it happens. Her symptoms have remained the same since they began. Sleep has been okay. She states she does not sleep with a pillow. She has iron deficiency anemia and has not been taking iron supplements for the past few months as she is not a fond of medications and it causes constipation. She denies sinus congestion/drainage     Hypothyroidism Follow-up      Since last visit, patient describes the following symptoms: Weight stable, no hair loss, no skin changes, no constipation, no loose stools      Video Start Time: 1356      Current Outpatient Medications   Medication Sig Dispense Refill     aspirin 81 MG EC tablet Take 81 mg by mouth daily       estradiol (ESTRACE) 2 MG tablet        ferrous sulfate ER (SLO-FE) 142 (45 Fe) MG CR tablet Take 1 tablet (142 mg) by mouth daily 90 tablet 1     levothyroxine (SYNTHROID/LEVOTHROID) 25 MCG tablet        Prenatal MV-Min-Fe Fum-FA-DHA (PRENATAL 1 PO)        UNABLE TO FIND MEDICATION NAME: Progesterone injection       UNABLE TO FIND MEDICATION NAME: Progesterone vag caps       vitamin D2 (ERGOCALCIFEROL) 32209 units (1250 mcg) capsule Take 1 capsule (50,000 Units) by mouth once a week 10 capsule 0     albuterol (PROAIR HFA/PROVENTIL HFA/VENTOLIN HFA) 108 (90 Base) MCG/ACT inhaler Inhale 2 puffs into the lungs every 6 hours (Patient not taking: Reported on 7/15/2020) 1 Inhaler 0     No Known Allergies    Reviewed and updated as needed this visit by Provider         Review of Systems   Constitutional, HEENT, cardiovascular, pulmonary, GI, , skin, and psych systems are negative, except scalp/face numbness and neck  tenderness       Objective             Physical Exam     GENERAL: Healthy, alert and no distress  EYES: Eyes grossly normal to inspection.  No discharge or erythema, or obvious scleral/conjunctival abnormalities.  RESP: No audible wheeze, cough, or visible cyanosis.  No visible retractions or increased work of breathing.    SKIN: Visible skin clear. No significant rash, abnormal pigmentation or lesions.  NEURO: Cranial nerves grossly intact.  Mentation and speech appropriate for age. No facial droop noted   PSYCH: Mentation appears normal, affect normal/bright, judgement and insight intact, normal speech and appearance well-groomed.      Assessment & Plan     1. Numbness of face  Reviewed symptoms with colleagues. Will check labs today. No red flag/warning signs seen via video visit today however recommended that if she develops the worst headache of her life, vision changes, troubles talking or swallowing or weakness she should call 911.    - **Comprehensive metabolic panel FUTURE anytime; Future  - **Vitamin B12 FUTURE 2mo; Future    2. Vitamin D deficiency  Will check labs and adjust medications accordingly   - **Vitamin D Deficiency FUTURE anytime; Future    3. Subclinical hypothyroidism  Will check labs and adjust medications accordingly   - **TSH with free T4 reflex FUTURE anytime; Future    4. Iron deficiency anemia, unspecified iron deficiency anemia type  Will check labs, pt denies needing a refill of iron supplements at this time.   - **Iron and iron binding capacity FUTURE 2mo; Future  - **Ferritin FUTURE 2mo; Future  - **CBC with platelets FUTURE anytime; Future       Return in about 1 week (around 7/22/2020) for If symptoms worsen or fail to improve.    Karie Khan NP  [facility]      Video-Visit Details    Type of service:  Video Visit    Video End Time:1414    Originating Location (pt. Location): Home    Distant Location (provider location):  [facility]      Platform used for Video Visit: Doximity    Return in about 1 week (around 7/22/2020) for If symptoms worsen or fail to improve.       Karie Khan, NP

## 2020-07-15 NOTE — PATIENT INSTRUCTIONS
If you develop the worst headache of your life, vision changes, troubles talking or swallowing or weakness you should call 911.

## 2020-07-16 DIAGNOSIS — R20.0 NUMBNESS OF FACE: ICD-10-CM

## 2020-07-16 DIAGNOSIS — E55.9 VITAMIN D DEFICIENCY: ICD-10-CM

## 2020-07-16 DIAGNOSIS — E03.8 SUBCLINICAL HYPOTHYROIDISM: ICD-10-CM

## 2020-07-16 DIAGNOSIS — D50.9 IRON DEFICIENCY ANEMIA, UNSPECIFIED IRON DEFICIENCY ANEMIA TYPE: ICD-10-CM

## 2020-07-16 LAB
ALBUMIN SERPL-MCNC: 3.5 G/DL (ref 3.4–5)
ALP SERPL-CCNC: 64 U/L (ref 40–150)
ALT SERPL W P-5'-P-CCNC: 35 U/L (ref 0–50)
ANION GAP SERPL CALCULATED.3IONS-SCNC: 9 MMOL/L (ref 3–14)
AST SERPL W P-5'-P-CCNC: 26 U/L (ref 0–45)
BILIRUB SERPL-MCNC: 0.4 MG/DL (ref 0.2–1.3)
BUN SERPL-MCNC: 9 MG/DL (ref 7–30)
CALCIUM SERPL-MCNC: 8.8 MG/DL (ref 8.5–10.1)
CHLORIDE SERPL-SCNC: 108 MMOL/L (ref 94–109)
CO2 SERPL-SCNC: 22 MMOL/L (ref 20–32)
CREAT SERPL-MCNC: 0.72 MG/DL (ref 0.52–1.04)
ERYTHROCYTE [DISTWIDTH] IN BLOOD BY AUTOMATED COUNT: 18.9 % (ref 10–15)
GFR SERPL CREATININE-BSD FRML MDRD: >90 ML/MIN/{1.73_M2}
GLUCOSE SERPL-MCNC: 85 MG/DL (ref 70–99)
HCT VFR BLD AUTO: 30.7 % (ref 35–47)
HGB BLD-MCNC: 9 G/DL (ref 11.7–15.7)
MCH RBC QN AUTO: 19 PG (ref 26.5–33)
MCHC RBC AUTO-ENTMCNC: 29.3 G/DL (ref 31.5–36.5)
MCV RBC AUTO: 65 FL (ref 78–100)
PLATELET # BLD AUTO: 299 10E9/L (ref 150–450)
POTASSIUM SERPL-SCNC: 3.8 MMOL/L (ref 3.4–5.3)
PROT SERPL-MCNC: 7.8 G/DL (ref 6.8–8.8)
RBC # BLD AUTO: 4.73 10E12/L (ref 3.8–5.2)
SODIUM SERPL-SCNC: 139 MMOL/L (ref 133–144)
TSH SERPL DL<=0.005 MIU/L-ACNC: 2.36 MU/L (ref 0.4–4)
VIT B12 SERPL-MCNC: 161 PG/ML (ref 193–986)
WBC # BLD AUTO: 5.3 10E9/L (ref 4–11)

## 2020-07-16 PROCEDURE — 83540 ASSAY OF IRON: CPT | Performed by: NURSE PRACTITIONER

## 2020-07-16 PROCEDURE — 36415 COLL VENOUS BLD VENIPUNCTURE: CPT | Performed by: NURSE PRACTITIONER

## 2020-07-16 PROCEDURE — 80053 COMPREHEN METABOLIC PANEL: CPT | Performed by: NURSE PRACTITIONER

## 2020-07-16 PROCEDURE — 82306 VITAMIN D 25 HYDROXY: CPT | Performed by: NURSE PRACTITIONER

## 2020-07-16 PROCEDURE — 82728 ASSAY OF FERRITIN: CPT | Performed by: NURSE PRACTITIONER

## 2020-07-16 PROCEDURE — 83550 IRON BINDING TEST: CPT | Performed by: NURSE PRACTITIONER

## 2020-07-16 PROCEDURE — 85027 COMPLETE CBC AUTOMATED: CPT | Performed by: NURSE PRACTITIONER

## 2020-07-16 PROCEDURE — 82607 VITAMIN B-12: CPT | Performed by: NURSE PRACTITIONER

## 2020-07-16 PROCEDURE — 84443 ASSAY THYROID STIM HORMONE: CPT | Performed by: NURSE PRACTITIONER

## 2020-07-17 ENCOUNTER — MEDICAL CORRESPONDENCE (OUTPATIENT)
Dept: HEALTH INFORMATION MANAGEMENT | Facility: CLINIC | Age: 40
End: 2020-07-17

## 2020-07-17 LAB
DEPRECATED CALCIDIOL+CALCIFEROL SERPL-MC: 29 UG/L (ref 20–75)
FERRITIN SERPL-MCNC: 3 NG/ML (ref 12–150)
IRON SATN MFR SERPL: 4 % (ref 15–46)
IRON SERPL-MCNC: 20 UG/DL (ref 35–180)
TIBC SERPL-MCNC: 474 UG/DL (ref 240–430)

## 2020-07-17 RX ORDER — UREA 10 %
500 LOTION (ML) TOPICAL DAILY
Qty: 90 TABLET | Refills: 0 | Status: SHIPPED | OUTPATIENT
Start: 2020-07-17 | End: 2021-12-07

## 2020-07-18 ENCOUNTER — OFFICE VISIT (OUTPATIENT)
Dept: URGENT CARE | Facility: URGENT CARE | Age: 40
End: 2020-07-18
Payer: COMMERCIAL

## 2020-07-18 VITALS
RESPIRATION RATE: 20 BRPM | BODY MASS INDEX: 35.1 KG/M2 | DIASTOLIC BLOOD PRESSURE: 86 MMHG | SYSTOLIC BLOOD PRESSURE: 123 MMHG | HEART RATE: 84 BPM | TEMPERATURE: 98.4 F | OXYGEN SATURATION: 99 % | WEIGHT: 191 LBS

## 2020-07-18 DIAGNOSIS — E53.8 VITAMIN B12 DEFICIENCY: ICD-10-CM

## 2020-07-18 DIAGNOSIS — D50.9 IRON DEFICIENCY ANEMIA, UNSPECIFIED IRON DEFICIENCY ANEMIA TYPE: ICD-10-CM

## 2020-07-18 DIAGNOSIS — Z11.9 SCREENING EXAMINATION FOR INFECTIOUS DISEASE: ICD-10-CM

## 2020-07-18 DIAGNOSIS — S01.01XA LACERATION OF SCALP WITHOUT FOREIGN BODY, INITIAL ENCOUNTER: Primary | ICD-10-CM

## 2020-07-18 PROCEDURE — 36415 COLL VENOUS BLD VENIPUNCTURE: CPT | Performed by: OBSTETRICS & GYNECOLOGY

## 2020-07-18 PROCEDURE — 87340 HEPATITIS B SURFACE AG IA: CPT | Performed by: OBSTETRICS & GYNECOLOGY

## 2020-07-18 PROCEDURE — 87389 HIV-1 AG W/HIV-1&-2 AB AG IA: CPT | Performed by: OBSTETRICS & GYNECOLOGY

## 2020-07-18 PROCEDURE — 99212 OFFICE O/P EST SF 10 MIN: CPT | Performed by: FAMILY MEDICINE

## 2020-07-18 PROCEDURE — 86803 HEPATITIS C AB TEST: CPT | Performed by: OBSTETRICS & GYNECOLOGY

## 2020-07-18 NOTE — PROGRESS NOTES
"Jann Gaitan is a 39 year old female who presents to clinic today for the following health issues:    HPI   Patient reports she injured forehead, hit it at rusted refrigerator.  She had a small laceration, initially she had bleeding, now the bleeding stopped.  She had no headache, no loss of consciousness.  No pain.    Came in was concerned she may need to have a tetanus vaccine.    Her last tetanus vaccine was in 2014.      Reviewed and updated as needed this visit by Provider         Review of Systems   Constitutional, HEENT, cardiovascular, pulmonary, gi and gu systems are negative, except as otherwise noted.      Objective    /86 (BP Location: Left arm, Patient Position: Chair, Cuff Size: Adult Regular)   Pulse 84   Temp 98.4  F (36.9  C) (Oral)   Resp 20   Wt 86.6 kg (191 lb)   SpO2 99%   Breastfeeding No   BMI 35.10 kg/m    Body mass index is 35.1 kg/m .  Physical Exam   GENERAL: healthy, alert and no distress  Small laceration on forehead, 2 mm by 1 mm, no bleeding. No bruising.    Diagnostic Test Results:  Labs reviewed in Epic  none         Assessment & Plan       ICD-10-CM    1. Laceration of scalp without foreign body, initial encounter  S01.01XA    Up to date with TDAP on 05/2014     BMI:   Estimated body mass index is 35.1 kg/m  as calculated from the following:    Height as of 3/13/20: 1.571 m (5' 1.85\").    Weight as of this encounter: 86.6 kg (191 lb).   Weight management plan: Discussed healthy diet and exercise guidelines        There are no Patient Instructions on file for this visit.    No follow-ups on file.    Tony Chavez MD  [facility]  "

## 2020-07-20 LAB
HBV SURFACE AG SERPL QL IA: NONREACTIVE
HCV AB SERPL QL IA: NONREACTIVE
HIV 1+2 AB+HIV1 P24 AG SERPL QL IA: NONREACTIVE

## 2020-08-27 ENCOUNTER — NURSE TRIAGE (OUTPATIENT)
Dept: FAMILY MEDICINE | Facility: CLINIC | Age: 40
End: 2020-08-27

## 2020-08-27 NOTE — TELEPHONE ENCOUNTER
IVF treatment currently and recently diagnosis with endometrioses    Burning sensation when urinating and notices a smell, she is unsure if this is coming from urine or vagina.     Appointment advised, patient would like to start with a virtual appointment with anyone she can. Patient scheduled tomorrow for a telephone appointment per protocol.  Nevaeh Aleman RN      Additional Information    Bad or foul-smelling urine    Negative: Shock suspected (e.g., cold/pale/clammy skin, too weak to stand, low BP, rapid pulse)    Negative: Sounds like a life-threatening emergency to the triager    Negative: Followed a genital area injury    Negative: Followed a genital area injury (penis, scrotum)    Negative: Vaginal discharge    Negative: Pus (white, yellow) or bloody discharge from end of penis    Negative: [1] Taking antibiotic for urinary tract infection (UTI) AND [2] female    Negative: [1] Taking antibiotic for urinary tract infection (UTI) AND [2] male    Negative: [1] Discomfort (pain, burning or stinging) when passing urine AND [2] pregnant    Negative: [1] Discomfort (pain, burning or stinging) when passing urine AND [2] postpartum (from 0 to 6 weeks after delivery)    Negative: [1] Discomfort (pain, burning or stinging) when passing urine AND [2] female    Negative: [1] Discomfort (pain, burning or stinging) when passing urine AND [2] male    Negative: Pain or itching in the vulvar area    Negative: Pain in scrotum is main symptom    Negative: Blood in the urine is main symptom    Negative: Symptoms arising from use of a urinary catheter (Roberts or Coude)    Negative: [1] Unable to urinate (or only a few drops) > 4 hours AND     [2] bladder feels very full (e.g., palpable bladder or strong urge to urinate)    Negative: [1] Decreased urination and [2] drinking very little AND [2] dehydration suspected (e.g., dark urine, no urine > 12 hours, very dry mouth, very lightheaded)    Negative: Patient sounds very sick or  "weak to the triager    Negative: Fever > 100.5 F (38.1 C)    Negative: Side (flank) or lower back pain present    Negative: [1] Can't control passage of urine (i.e., urinary incontinence) AND [2] new onset (< 2 weeks) or worsening    Negative: Urinating more frequently than usual (i.e., frequency)    Answer Assessment - Initial Assessment Questions  1. SYMPTOM: \"What's the main symptom you're concerned about?\" (e.g., frequency, incontinence)      [place]  2. ONSET: \"When did the  start?\"      More than a week   3. PAIN: \"Is there any pain?\" If so, ask: \"How bad is it?\" (Scale: 1-10; mild, moderate, severe)      yes  4. CAUSE: \"What do you think is causing the symptoms?\"      UTI  5. OTHER SYMPTOMS: \"Do you have any other symptoms?\" (e.g., fever, flank pain, blood in urine, pain with urination)      no  6. PREGNANCY: \"Is there any chance you are pregnant?\" \"When was your last menstrual period?\"      No, currently going through IVF treatment    Protocols used: URINARY SYMPTOMS-A-AH      "

## 2020-08-27 NOTE — TELEPHONE ENCOUNTER
Reason for call:  Patient reporting a symptom    Symptom or request: possible uti     Duration (how long have symptoms been present): 2 weeks     Have you been treated for this before? Yes    Additional comments: Patient requesting for nurse to call with recommendations     Phone Number patient can be reached at:  Home number on file [phone] (home)    Best Time:  Anytime     Can we leave a detailed message on this number:  YES    Call taken on 8/27/2020 at 1:49 PM by Jessica Castillo

## 2020-08-28 ENCOUNTER — VIRTUAL VISIT (OUTPATIENT)
Dept: FAMILY MEDICINE | Facility: CLINIC | Age: 40
End: 2020-08-28
Payer: COMMERCIAL

## 2020-08-28 DIAGNOSIS — N89.8 VAGINAL DISCHARGE: ICD-10-CM

## 2020-08-28 DIAGNOSIS — R30.0 DYSURIA: Primary | ICD-10-CM

## 2020-08-28 DIAGNOSIS — B37.31 YEAST INFECTION OF THE VAGINA: ICD-10-CM

## 2020-08-28 DIAGNOSIS — R30.0 DYSURIA: ICD-10-CM

## 2020-08-28 LAB
ALBUMIN UR-MCNC: NEGATIVE MG/DL
APPEARANCE UR: CLEAR
BACTERIA #/AREA URNS HPF: ABNORMAL /HPF
BILIRUB UR QL STRIP: NEGATIVE
COLOR UR AUTO: YELLOW
GLUCOSE UR STRIP-MCNC: NEGATIVE MG/DL
HGB UR QL STRIP: NEGATIVE
KETONES UR STRIP-MCNC: NEGATIVE MG/DL
LEUKOCYTE ESTERASE UR QL STRIP: ABNORMAL
MUCOUS THREADS #/AREA URNS LPF: PRESENT /LPF
NITRATE UR QL: NEGATIVE
NON-SQ EPI CELLS #/AREA URNS LPF: ABNORMAL /LPF
PH UR STRIP: 5 PH (ref 5–7)
RBC #/AREA URNS AUTO: ABNORMAL /HPF
SOURCE: ABNORMAL
SP GR UR STRIP: 1.02 (ref 1–1.03)
SPECIMEN SOURCE: ABNORMAL
UROBILINOGEN UR STRIP-ACNC: 0.2 EU/DL (ref 0.2–1)
WBC #/AREA URNS AUTO: ABNORMAL /HPF
WET PREP SPEC: ABNORMAL

## 2020-08-28 PROCEDURE — 87210 SMEAR WET MOUNT SALINE/INK: CPT | Performed by: NURSE PRACTITIONER

## 2020-08-28 PROCEDURE — 81001 URINALYSIS AUTO W/SCOPE: CPT | Performed by: NURSE PRACTITIONER

## 2020-08-28 PROCEDURE — 99213 OFFICE O/P EST LOW 20 MIN: CPT | Mod: 95 | Performed by: NURSE PRACTITIONER

## 2020-08-28 RX ORDER — FLUCONAZOLE 150 MG/1
150 TABLET ORAL ONCE
Qty: 1 TABLET | Refills: 0 | Status: SHIPPED | OUTPATIENT
Start: 2020-08-28 | End: 2020-08-28

## 2020-08-28 RX ORDER — NORETHINDRONE ACETATE 5 MG
1 TABLET ORAL DAILY
COMMUNITY
Start: 2020-08-10 | End: 2021-04-09

## 2020-08-28 RX ORDER — LETROZOLE 2.5 MG/1
2 TABLET, FILM COATED ORAL DAILY
COMMUNITY
Start: 2020-08-11 | End: 2021-04-09

## 2020-08-28 NOTE — PROGRESS NOTES
"Ines Gaitan is a 39 year old female who is being evaluated via a billable telephone visit.      The patient has been notified of following:     \"This telephone visit will be conducted via a call between you and your physician/provider. We have found that certain health care needs can be provided without the need for a physical exam.  This service lets us provide the care you need with a short phone conversation.  If a prescription is necessary we can send it directly to your pharmacy.  If lab work is needed we can place an order for that and you can then stop by our lab to have the test done at a later time.    Telephone visits are billed at different rates depending on your insurance coverage. During this emergency period, for some insurers they may be billed the same as an in-person visit.  Please reach out to your insurance provider with any questions.    If during the course of the call the physician/provider feels a telephone visit is not appropriate, you will not be charged for this service.\"    Patient has given verbal consent for Telephone visit?  Yes    What phone number would you like to be contacted at? [phone]    How would you like to obtain your AVS? Emiliano Forte     Ines Gaitan is a 39 year old female who presents via phone visit today for the following health issues:    HPI    Folic acid  Prenatal    Genitourinary - Female  Onset/Duration: more than a week ago.    Description:   Painful urination (Dysuria): YES           Frequency: YES  Blood in urine (Hematuria): no  Delay in urine (Hesitency): no  Intensity: mild, moderate  Progression of Symptoms:  same and constant  Accompanying Signs & Symptoms:  Fever/chills: no  Flank pain: no  Nausea and vomiting: no  Vaginal symptoms: itching and burning   POSITIVE for vaginal itching  Abdominal/Pelvic Pain: no  History:   History of frequent UTI s: YES  History of kidney stones: no  Sexually Active: no  Possibility of pregnancy: " "No  Precipitating or alleviating factors: None  Therapies tried and outcome: Increase fluid intake helps    Currently on medication for endometerosis, 8/17/2020 she started Norethindrone and Letrozole.    Nurse triage note from yesterday:  \"IVF treatment currently and recently diagnosis with endometrioses     Burning sensation when urinating and notices a smell, she is unsure if this is coming from urine or vagina.\"      Review of Systems   Constitutional, HEENT, cardiovascular, pulmonary, gi and gu systems are negative, except as otherwise noted.       Objective          Vitals:  No vitals were obtained today due to virtual visit.    healthy, alert and no distress  PSYCH: Alert and oriented times 3; coherent speech, normal   rate and volume, able to articulate logical thoughts, able   to abstract reason, no tangential thoughts, no hallucinations   or delusions  Her affect is normal  RESP: No cough, no audible wheezing, able to talk in full sentences  Remainder of exam unable to be completed due to telephone visits          Assessment/Plan:    Assessment & Plan     Dysuria    - UA with Microscopic reflex to Culture; Future    Vaginal discharge    - Wet prep; Future     Patient has h/o urinary tract infection, yeast infection, and bacterial vaginosis in the past.  Recommend getting UA and wet prep.  Since this is a telephone visit, okay for self collection of wet prep.  Patient is agreeable to plan.    Return today (on 8/28/2020) for Lab Work.    Ese Rodriguez NP  [facility]    Phone call duration:  14 minutes with review of chart, review of patient concerns and review of ongoing plans.    Telephone visit (rather than a office visit) done today due to COVID-19 pandemic.      "

## 2020-08-28 NOTE — PATIENT INSTRUCTIONS
[facility]     Discharged by : Ese Rodriguez NP  Paper scripts provided to patient : none     If you have any questions regarding your visit please contact your care team:     Team Linda              Clinic Hours Telephone Number     Dr. Ravi Rodriguez, CNP   7am-7pm  Monday - Thursday   7am-5pm  Fridays  [phone]   (Appointment scheduling available 24/7)     RN Line  [phone] option 2     Urgent Care - [place] - 11am-9pm Monday-Friday Saturday-Sunday- 9am-5pm     [place] -   5pm-9pm Monday-Friday Saturday-Sunday- 9am-5pm    [phone] - [facility]    [phone] - [place]     For a Price Quote for your services, please call our Consumer Price Line at [phone].     What options do I have for visits at the clinic other than the traditional office visit?     To expand how we care for you, many of our providers are utilizing electronic visits (e-visits) and telephone visits, when medically appropriate, for interactions with their patients rather than a visit in the clinic. We also offer nurse visits for many medical concerns. Just like any other service, we will bill your insurance company for this type of visit based on time spent on the phone with your provider. Not all insurance companies cover these visits. Please check with your medical insurance if this type of visit is covered. You will be responsible for any charges that are not paid by your insurance.     E-visits via [facility]: generally incur a $45.00 fee.     Telephone visits:  Time spent on the phone: *charged based on time that is spent on the phone in increments of 10 minutes. Estimated cost:   5-10 mins $30.00   11-20 mins. $59.00   21-30 mins. $85.00       Use [employer] (secure email communication and access to your chart) to send your primary care provider a message or make an appointment. Ask someone on your Team how to sign up for [employer].      As always, Thank you for trusting us with your health care needs!      [facility]:    Scheduling Appointments  [facility]  Call: [phone]    [facility] [place]  Call: [phone]    [facility]  Call: [phone]    For Gastroenterology referrals   [facility]   Clinics and Surgery Center, 4th Floor   [address] [place] [address]   Appointments: [phone]    WHERE TO GO FOR CARE?    Clinic    Make an appointment if you:       Are sick (cold, cough, flu, sore throat, earache or in pain).       Have a small injury (sprain, small cut, burn or broken bone).       Need a physical exam, Pap smear, vaccine or prescription refill.       Have questions about your health or medicines.    To reach us:      Call [phone] ([phone]). Open 24 hours every day. (For counseling services, call [phone].)    Log into [facility]. (Visit [email or web address] to create an account.) Hospital emergency room    An emergency is a serious or life- threatening problem that must be treated right away.    Call 272 or get to the hospital if you have:      Very bad or sudden:            - Chest pain or pressure         - Bleeding         - Head or belly pain         - Dizziness or trouble seeing, walking or                          Speaking      Problems breathing      Blood in your vomit or you are coughing up blood      A major injury (knocked out, loss of a finger or limb, rape, broken bone protruding from skin)    A mental health crisis. (Or call the Mental Health Crisis line at 1-241.910.2654 or Suicide Prevention Hotline at 1-561.551.3947.)    Open 24 hours every day. You don't need an appointment.     Urgent care    Visit urgent care for sickness or small injuries when the clinic is closed. You don't need an appointment. To check hours or find an urgent care near you, visit  [email or web address]. Online care    Get online care from [facility] for more than 70 common problems, like colds, allergies and infections. Open 24 hours every day at:   [email or web address]   Need help deciding?    For advice about where to be seen, you may call your clinic and ask to speak with a nurse. We're here for you 24 hours every day.         If you are deaf or hard of hearing, please let us know. We provide many free services including sign language interpreters, oral interpreters, TTYs, telephone amplifiers, note takers and written materials.

## 2020-10-07 ENCOUNTER — OFFICE VISIT (OUTPATIENT)
Dept: OPHTHALMOLOGY | Facility: CLINIC | Age: 40
End: 2020-10-07
Payer: COMMERCIAL

## 2020-10-07 DIAGNOSIS — S05.02XS ABRASION OF LEFT CORNEA, SEQUELA: Primary | ICD-10-CM

## 2020-10-07 PROCEDURE — 92002 INTRM OPH EXAM NEW PATIENT: CPT | Performed by: STUDENT IN AN ORGANIZED HEALTH CARE EDUCATION/TRAINING PROGRAM

## 2020-10-07 RX ORDER — TOBRAMYCIN AND DEXAMETHASONE 3; 1 MG/ML; MG/ML
1 SUSPENSION/ DROPS OPHTHALMIC 3 TIMES DAILY
Qty: 1 BOTTLE | Refills: 0 | Status: SHIPPED | OUTPATIENT
Start: 2020-10-07 | End: 2021-12-07

## 2020-10-07 ASSESSMENT — TONOMETRY
IOP_METHOD: ICARE
OS_IOP_MMHG: 22
OD_IOP_MMHG: 19

## 2020-10-07 ASSESSMENT — VISUAL ACUITY
METHOD: SNELLEN - LINEAR
OS_PH_SC: 20/40
OD_CC: 20/20
OS_PH_SC+: -1
OS_SC: 20/150
CORRECTION_TYPE: CONTACTS

## 2020-10-07 ASSESSMENT — EXTERNAL EXAM - RIGHT EYE: OD_EXAM: NORMAL

## 2020-10-07 ASSESSMENT — SLIT LAMP EXAM - LIDS
COMMENTS: NORMAL
COMMENTS: NORMAL

## 2020-10-07 ASSESSMENT — EXTERNAL EXAM - LEFT EYE: OS_EXAM: NORMAL

## 2020-10-07 NOTE — LETTER
"    10/7/2020         RE: Ines Gaitan  [address]  [place] [address]        Dear Colleague,    Thank you for referring your patient, Ines Gaitan, to the [facility]. Please see a copy of my visit note below.     Current Eye Medications:  none     Subjective:  Was wearing soft contact and had severe itching left eye causing patient to rub the left eye on Sunday (4 days ago). Eye was pink, irritated, and had bump after on Sunday. Slight aching pain comes and goes left eye since scratching on Sunday. Patient using contact right eye, stopped left eye since scratch. Vision is doing well right eye. Vision is blurry left eye, but can't use contact. Patient uses both monthly and daily contacts, was using monthly when scratch happened. Patient uses contacts 10-12 hours a day. Would normally use glasses at home, but broke a week ago.      Objective:  See Ophthalmology Exam.       Assessment:  Ines Gaitan is a 39 year old female who presents with:   Encounter Diagnosis   Name Primary?     Abrasion of left cornea, sequela        Plan:  Use tobradex drops three times a day in the left eye for 5 days    Use artificial tears up to four times a day (like Refresh Optive, Systane Balance, TheraTears, or generic artificial tears are ok. Avoid \"get the red out\" drops).     Stay out of contact lens in the left eye while using medicated drops    Almaz Powell MD  [phone]          Again, thank you for allowing me to participate in the care of your patient.        Sincerely,        Almaz Powell MD    "

## 2020-10-07 NOTE — PROGRESS NOTES
" Current Eye Medications:  none     Subjective:  Was wearing soft contact and had severe itching left eye causing patient to rub the left eye on Sunday (4 days ago). Eye was pink, irritated, and had bump after on Sunday. Slight aching pain comes and goes left eye since scratching on Sunday. Patient using contact right eye, stopped left eye since scratch. Vision is doing well right eye. Vision is blurry left eye, but can't use contact. Patient uses both monthly and daily contacts, was using monthly when scratch happened. Patient uses contacts 10-12 hours a day. Would normally use glasses at home, but broke a week ago.      Objective:  See Ophthalmology Exam.       Assessment:  Ines Gaitan is a 39 year old female who presents with:   Encounter Diagnosis   Name Primary?     Abrasion of left cornea, sequela        Plan:  Use tobradex drops three times a day in the left eye for 5 days    Use artificial tears up to four times a day (like Refresh Optive, Systane Balance, TheraTears, or generic artificial tears are ok. Avoid \"get the red out\" drops).     Stay out of contact lens in the left eye while using medicated drops    Almaz Powell MD  [phone]      "

## 2020-10-07 NOTE — PATIENT INSTRUCTIONS
"Use tobradex drops three times a day in the left eye for 5 days    Use artificial tears up to four times a day (like Refresh Optive, Systane Balance, TheraTears, or generic artificial tears are ok. Avoid \"get the red out\" drops).     Stay out of contact lens in the left eye while using medicated drops    Almaz Powell MD  [phone]    "

## 2020-10-21 ENCOUNTER — TELEPHONE (OUTPATIENT)
Dept: OBGYN | Facility: CLINIC | Age: 40
End: 2020-10-21

## 2020-10-21 NOTE — TELEPHONE ENCOUNTER
I called patient and offered her an appointment with Dr. Agbeh at [facility] tomorrow to be seen for UTI concerns.  I cancelled her Friday video appointment and made 10:30 appointment at [facility] tomorrow.  States understanding.  ALLAN Mahan 10/21/2020

## 2020-10-21 NOTE — TELEPHONE ENCOUNTER
[facility] Call Center    Phone Message    May a detailed message be left on voicemail: yes     Reason for Call: Patient scheduled appointment for Friday 10/23/2020 for UTI concerns. She is wondering if she could come in today or tomorrow to get urine testing done? Please advise. Thank you.    Action Taken: Message routed to:  [phone]    Travel Screening: Not Applicable

## 2020-10-22 ENCOUNTER — OFFICE VISIT (OUTPATIENT)
Dept: OBGYN | Facility: CLINIC | Age: 40
End: 2020-10-22
Payer: COMMERCIAL

## 2020-10-22 VITALS
HEART RATE: 91 BPM | DIASTOLIC BLOOD PRESSURE: 76 MMHG | WEIGHT: 189.6 LBS | BODY MASS INDEX: 34.85 KG/M2 | SYSTOLIC BLOOD PRESSURE: 110 MMHG

## 2020-10-22 DIAGNOSIS — N89.8 VAGINAL ITCHING: ICD-10-CM

## 2020-10-22 DIAGNOSIS — B37.31 YEAST INFECTION OF THE VAGINA: ICD-10-CM

## 2020-10-22 DIAGNOSIS — R30.0 DYSURIA: Primary | ICD-10-CM

## 2020-10-22 LAB
ALBUMIN UR-MCNC: NEGATIVE MG/DL
APPEARANCE UR: CLEAR
BACTERIA #/AREA URNS HPF: ABNORMAL /HPF
BILIRUB UR QL STRIP: NEGATIVE
COLOR UR AUTO: YELLOW
GLUCOSE UR STRIP-MCNC: NEGATIVE MG/DL
HGB UR QL STRIP: NEGATIVE
KETONES UR STRIP-MCNC: NEGATIVE MG/DL
LEUKOCYTE ESTERASE UR QL STRIP: ABNORMAL
NITRATE UR QL: NEGATIVE
NON-SQ EPI CELLS #/AREA URNS LPF: ABNORMAL /LPF
PH UR STRIP: 5.5 PH (ref 5–7)
RBC #/AREA URNS AUTO: ABNORMAL /HPF
SOURCE: ABNORMAL
SP GR UR STRIP: 1.01 (ref 1–1.03)
SPECIMEN SOURCE: ABNORMAL
UROBILINOGEN UR STRIP-ACNC: 0.2 EU/DL (ref 0.2–1)
WBC #/AREA URNS AUTO: ABNORMAL /HPF
WET PREP SPEC: ABNORMAL

## 2020-10-22 PROCEDURE — 81001 URINALYSIS AUTO W/SCOPE: CPT | Performed by: OBSTETRICS & GYNECOLOGY

## 2020-10-22 PROCEDURE — 99214 OFFICE O/P EST MOD 30 MIN: CPT | Performed by: OBSTETRICS & GYNECOLOGY

## 2020-10-22 PROCEDURE — 87210 SMEAR WET MOUNT SALINE/INK: CPT | Performed by: OBSTETRICS & GYNECOLOGY

## 2020-10-22 RX ORDER — TERCONAZOLE 8 MG/G
1 CREAM VAGINAL AT BEDTIME
Qty: 30 G | Refills: 1 | Status: SHIPPED | OUTPATIENT
Start: 2020-10-22 | End: 2021-12-07

## 2020-10-22 NOTE — PROGRESS NOTES
Ines is a 39 year old [id number] referred here by self for consultation regarding recurrent vaginal itching. She was treated with diflucan for yeast about 4 months ago.  Since then she self medicated with OTC monistat cream without relief.  She is on vaginal estradiol from [facility] and due for IVF on 10/2/20.    ROS: Ten point review of systems was reviewed and negative except the above.    Gyne: - abn pap (last pap ), - STD's    Past Medical History:   Diagnosis Date     ASCUS of cervix with negative high risk HPV 2014    neg HPV. Plan cotest in 3 years.     MEDICAL HISTORY OF -     infection of L5-S1.  ?abscess Treatment in [place]     Subclinical hyperthyroidism 2016    resolved; had positive TSI and normal thyroid ultrasound     Tuberculosis      Past Surgical History:   Procedure Laterality Date     HC TREATMENT MISSED  SURG, 1ST TRIMESTER  2016     LAPAROSCOPIC SALPINGECTOMY Left 2019    small hydrosalpinx     Patient Active Problem List   Diagnosis     CARDIOVASCULAR SCREENING; LDL GOAL LESS THAN 160     Chronic constipation     Vitamin D deficiency     Pain in thoracic spine     Iron deficiency anemia, unspecified iron deficiency anemia type     Subclinical hypothyroidism     Female infertility     Class 1 obesity due to excess calories without serious comorbidity with body mass index (BMI) of 32.0 to 32.9 in adult       ALL/Meds: Her medication and allergy histories were reviewed and are documented in their appropriate chart areas.    SH: - tob, - EtOH,     FH: Her family history was reviewed and documented in its appropriate chart area.    PE: /76   Pulse 91   Wt 86 kg (189 lb 9.6 oz)   LMP 2020 (Exact Date)   Breastfeeding No   BMI 34.85 kg/m    Body mass index is 34.85 kg/m .    General Appearance:  healthy, alert, active, no distress  HEENT: NCAT  Abdomen: Soft, nontender.  Normal bowel sounds.  No masses  Pelvic:       - Ext: NEFG,        -  Urethra: no lesions, no masses, no hypermobility       - Urethral Meatus: normal appearance,        - Bladder: no tenderness, no masses       - Vagina: greenish discharge( estradiol)  discharge       - Cervix: no lesions, parous      Nurse for exam.  Results for orders placed or performed in visit on 10/22/20   *UA reflex to Microscopic     Status: Abnormal   Result Value Ref Range    Color Urine Yellow     Appearance Urine Clear     Glucose Urine Negative NEG^Negative mg/dL    Bilirubin Urine Negative NEG^Negative    Ketones Urine Negative NEG^Negative mg/dL    Specific Gravity Urine 1.010 1.003 - 1.035    Blood Urine Negative NEG^Negative    pH Urine 5.5 5.0 - 7.0 pH    Protein Albumin Urine Negative NEG^Negative mg/dL    Urobilinogen Urine 0.2 0.2 - 1.0 EU/dL    Nitrite Urine Negative NEG^Negative    Leukocyte Esterase Urine Moderate (A) NEG^Negative    Source Midstream Urine    Urine Microscopic     Status: Abnormal   Result Value Ref Range    WBC Urine 5-10 (A) OTO5^0 - 5 /HPF    RBC Urine O - 2 OTO2^O - 2 /HPF    Squamous Epithelial /LPF Urine Few FEW^Few /LPF    Bacteria Urine Few (A) NEG^Negative /HPF   Wet prep     Status: Abnormal    Specimen: Vagina   Result Value Ref Range    Specimen Description Vagina     Wet Prep No Trichomonas seen     Wet Prep No clue cells seen     Wet Prep Yeast seen  Moderate   (A)     Wet Prep WBC'S seen  Few          A/P    ICD-10-CM    1. Dysuria  R30.0 *UA reflex to Microscopic     Urine Microscopic   2. Vaginal itching  N89.8 Wet prep     terconazole (TERAZOL 3) 0.8 % vaginal cream   3. Yeast infection of the vagina  B37.3 Wet prep     terconazole (TERAZOL 3) 0.8 % vaginal cream     Will treat recurrent yeast infection.    25 minutes was spent face to face with the patient today discussing her history, diagnosis, and follow-up plan as noted above.  Over 50% of the visit was spent in counseling and coordination of care.    Total Visit Time: 30 minutes.    CEPHAS AGBEH, MD.

## 2020-10-22 NOTE — PATIENT INSTRUCTIONS
If you have any questions regarding your visit, Please contact your care team.  [employer] [facility]: [phone]  [facility] CLINIC HOURS TELEPHONE NUMBER   Cephas Agbeh, M.D.      Freddie Garcia-  Chantelle-         Monday-[place]    8:00a.m-4:45 p.m    Tuesday--[place]     8:00a.m-4:45 p.m.    Thursday-[place]    8:00a.m-4:45 p.m.    Friday-[place]    8:00a.m-4:45 p.m    [facility]   [address] [place] [address]   [phone]-Ask for [facility]   Fax [phone]   Sfvfynn-[phone]     [facility]   [address] [place] [address] [place] [address]   [phone]    [facility]  [address] [facility] [address]  [phone]  Stlmffp-[phone]   Urgent Care locations:    [facility] Monday-Friday  5 pm - 9 pm  Saturday and Sunday   9 am - 5 pm   Monday-Friday   5 pm - 9 pm  Saturday and Sunday  9 am - 5 pm    [phone]   If you need a medication refill, please contact your pharmacy. Please allow 3 business days for your refill to be completed.  As always, Thank you for trusting us with your healthcare needs!

## 2020-10-23 ENCOUNTER — TELEPHONE (OUTPATIENT)
Dept: OBGYN | Facility: CLINIC | Age: 40
End: 2020-10-23

## 2020-10-23 DIAGNOSIS — B37.31 YEAST INFECTION OF THE VAGINA: Primary | ICD-10-CM

## 2020-10-23 RX ORDER — FLUCONAZOLE 150 MG/1
150 TABLET ORAL DAILY
Qty: 3 TABLET | Refills: 0 | Status: SHIPPED | OUTPATIENT
Start: 2020-10-23 | End: 2020-10-26

## 2020-10-23 NOTE — TELEPHONE ENCOUNTER
Reason for call:  Other   Patient called regarding (reason for call): prescription  Additional comments: Patient was given an Rx for a yeast infection cream yesterday but is asking instead to have the oral medication that was discussed. Please call.     Phone number to reach patient:  Home number on file [phone] (home)    Best Time:  Before 3 pm    Can we leave a detailed message on this number?  YES    Travel screening: Not Applicable

## 2020-10-23 NOTE — TELEPHONE ENCOUNTER
Pt was prescribed terconazole (TERAZOL 3) 0.8 % vaginal cream yesterday, 10/22/2020, for a yeast infection.  Pt is requesting an oral medication instead.    Will route to Dr. Agbeh to send in an rx for an oral medication for her yeast infection.    Lola Jarrell RN

## 2020-10-23 NOTE — TELEPHONE ENCOUNTER
Agbeh, Cephas Mawuena, MD  You 13 minutes ago (11:22 AM)     A prescription for diflucan has been sent to pharmacy.   CEPHAS AGBEH, MD.      Let pt know that an oral yeast medication was sent to her pharmacy.  Pt grateful for this as her IVF doctor said they will be doing a transfer soon and they do not want her putting any medicine in her vagina.    Lola Jarrell RN

## 2020-11-02 ENCOUNTER — TRANSFERRED RECORDS (OUTPATIENT)
Dept: HEALTH INFORMATION MANAGEMENT | Facility: CLINIC | Age: 40
End: 2020-11-02

## 2020-12-20 ENCOUNTER — HEALTH MAINTENANCE LETTER (OUTPATIENT)
Age: 40
End: 2020-12-20

## 2021-01-07 ENCOUNTER — OFFICE VISIT (OUTPATIENT)
Dept: FAMILY MEDICINE | Facility: CLINIC | Age: 41
End: 2021-01-07
Payer: COMMERCIAL

## 2021-01-07 VITALS
HEIGHT: 62 IN | SYSTOLIC BLOOD PRESSURE: 112 MMHG | HEART RATE: 97 BPM | BODY MASS INDEX: 34.96 KG/M2 | DIASTOLIC BLOOD PRESSURE: 72 MMHG | WEIGHT: 190 LBS | OXYGEN SATURATION: 99 %

## 2021-01-07 DIAGNOSIS — E03.8 SUBCLINICAL HYPOTHYROIDISM: ICD-10-CM

## 2021-01-07 DIAGNOSIS — D50.9 IRON DEFICIENCY ANEMIA, UNSPECIFIED IRON DEFICIENCY ANEMIA TYPE: ICD-10-CM

## 2021-01-07 DIAGNOSIS — Z00.00 ROUTINE GENERAL MEDICAL EXAMINATION AT A HEALTH CARE FACILITY: Primary | ICD-10-CM

## 2021-01-07 DIAGNOSIS — Z12.31 VISIT FOR SCREENING MAMMOGRAM: ICD-10-CM

## 2021-01-07 DIAGNOSIS — F43.21 ADJUSTMENT DISORDER WITH DEPRESSED MOOD: ICD-10-CM

## 2021-01-07 DIAGNOSIS — N97.9 FEMALE FERTILITY PROBLEM: ICD-10-CM

## 2021-01-07 DIAGNOSIS — E55.9 VITAMIN D DEFICIENCY: ICD-10-CM

## 2021-01-07 PROCEDURE — 90471 IMMUNIZATION ADMIN: CPT | Performed by: FAMILY MEDICINE

## 2021-01-07 PROCEDURE — 90686 IIV4 VACC NO PRSV 0.5 ML IM: CPT | Performed by: FAMILY MEDICINE

## 2021-01-07 PROCEDURE — 99396 PREV VISIT EST AGE 40-64: CPT | Mod: 25 | Performed by: FAMILY MEDICINE

## 2021-01-07 PROCEDURE — 99214 OFFICE O/P EST MOD 30 MIN: CPT | Mod: 25 | Performed by: FAMILY MEDICINE

## 2021-01-07 RX ORDER — ERGOCALCIFEROL 1.25 MG/1
50000 CAPSULE, LIQUID FILLED ORAL WEEKLY
Qty: 10 CAPSULE | Refills: 3 | Status: SHIPPED | OUTPATIENT
Start: 2021-01-07 | End: 2021-12-07

## 2021-01-07 ASSESSMENT — ENCOUNTER SYMPTOMS
DIZZINESS: 0
COUGH: 0
DIARRHEA: 0
CHILLS: 0
FREQUENCY: 0
CONSTIPATION: 0
FEVER: 0
HEMATURIA: 0
ABDOMINAL PAIN: 0
EYE PAIN: 0
HEMATOCHEZIA: 0
NERVOUS/ANXIOUS: 0

## 2021-01-07 ASSESSMENT — MIFFLIN-ST. JEOR: SCORE: 1477.7

## 2021-01-07 NOTE — PROGRESS NOTES
SUBJECTIVE:   CC: Ines Gaitan is an 40 year old woman who presents for preventive health visit.       Patient has been advised of split billing requirements and indicates understanding: Yes  Healthy Habits:     Getting at least 3 servings of Calcium per day:  Yes    Bi-annual eye exam:  Yes    Dental care twice a year:  NO    Sleep apnea or symptoms of sleep apnea:  Excessive snoring    Diet:  Vegetarian/vegan    Frequency of exercise:  2-3 days/week    Duration of exercise:  15-30 minutes    Taking medications regularly:  Yes    Medication side effects:  None    PHQ-2 Total Score: 2    Additional concerns today:  No    Follow up on hemoglobin and iron     Not taking iron tabs, she has history of iron def  Constipation     She has had a hard time conceiving, she has had many attempts IVF, tubes blocked and had surgery for that , egg retreival 12 eggs, out of 12 -8 embroys , 2  2 times , eggs passed after genetic testing, transfer of embryo did not work  She is having a hard time emotionally,   She is on hormones for the IVF  Weight gain    ? 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 Hemoglobin was 8. This was done through fertility clinic.   She has checked her BP at home when he eyes get bloodshot. Results around 120/70-80. Both of her parents deal with high blood pressure and heart issues so she was concerned.       Psychologist -she has one with IVF group and is seeing them    Weight -she is avoiding sweets, intermittent fasting      Today's PHQ-2 Score:   PHQ-2 ( 1999 Pfizer) 1/7/2021   Q1: Little interest or pleasure in doing things 1   Q2: Feeling down, depressed or hopeless 1   PHQ-2 Score 2   Q1: Little interest  or pleasure in doing things Several days   Q2: Feeling down, depressed or hopeless Several days   PHQ-2 Score 2       Abuse: Current or Past (Physical, Sexual or Emotional) - No  Do you feel safe in your environment? Yes        Social History     Tobacco Use     Smoking status: Never Smoker     Smokeless tobacco: Never Used   Substance Use Topics     Alcohol use: No         Alcohol Use 2021   Prescreen: >3 drinks/day or >7 drinks/week? Not Applicable   Prescreen: >3 drinks/day or >7 drinks/week? -       Reviewed orders with patient.  Reviewed health maintenance and updated orders accordingly - Yes  BP Readings from Last 3 Encounters:   21 112/72   10/22/20 110/76   20 123/86    Wt Readings from Last 3 Encounters:   21 86.2 kg (190 lb)   10/22/20 86 kg (189 lb 9.6 oz)   20 86.6 kg (191 lb)                    Mammogram Screening: Patient under age 50, mutual decision reflected in health maintenance.      Pertinent mammograms are reviewed under the imaging tab.  History of abnormal Pap smear: NO - age 30- 65 PAP every 3 years recommended  PAP / HPV Latest Ref Rng & Units 3/4/2019 9/15/2016 2014   PAP - NIL NIL ASC-US(A)   HPV 16 DNA NEG:Negative Negative Negative -   HPV 18 DNA NEG:Negative Negative Negative -   OTHER HR HPV NEG:Negative Negative Negative -     Reviewed and updated as needed this visit by clinical staff  Tobacco  Allergies  Meds   Med Hx  Surg Hx  Fam Hx  Soc Hx        Reviewed and updated as needed this visit by Provider                Past Medical History:   Diagnosis Date     ASCUS of cervix with negative high risk HPV 2014    neg HPV. Plan cotest in 3 years.     MEDICAL HISTORY OF - 2006    infection of L5-S1.  ?abscess Treatment in [place]     Subclinical hyperthyroidism     resolved; had positive TSI and normal thyroid ultrasound     Tuberculosis       Past Surgical History:   Procedure Laterality Date     HC TREATMENT MISSED  SURG, 1ST  "TRIMESTER  2016     LAPAROSCOPIC SALPINGECTOMY Left 2019    small hydrosalpinx     OB History    Para Term  AB Living   2 0 0 0 2 0   SAB TAB Ectopic Multiple Live Births   2 0 0 0 0      # Outcome Date GA Lbr Ant/2nd Weight Sex Delivery Anes PTL Lv   2 SAB 10/2016     SAB      1 SAB 2015     SAB          Review of Systems   Constitutional: Negative for chills and fever.   HENT: Negative for congestion and ear pain.    Eyes: Negative for pain.   Respiratory: Negative for cough.    Cardiovascular: Negative for chest pain.   Gastrointestinal: Negative for abdominal pain, constipation, diarrhea and hematochezia.   Genitourinary: Negative for frequency, genital sores and hematuria.   Neurological: Negative for dizziness.   Psychiatric/Behavioral: The patient is not nervous/anxious.      CONSTITUTIONAL: NEGATIVE for fever, chills, change in weight  INTEGUMENTARU/SKIN: NEGATIVE for worrisome rashes, moles or lesions  EYES: NEGATIVE for vision changes or irritation  ENT: NEGATIVE for ear, mouth and throat problems  RESP: NEGATIVE for significant cough or SOB  BREAST: NEGATIVE for masses, tenderness or discharge  CV: NEGATIVE for chest pain, palpitations or peripheral edema  GI: NEGATIVE for nausea, abdominal pain, heartburn, or change in bowel habits  : NEGATIVE for unusual urinary or vaginal symptoms. Periods are regular.  MUSCULOSKELETAL: NEGATIVE for significant arthralgias or myalgia  NEURO: NEGATIVE for weakness, dizziness or paresthesias  PSYCHIATRIC: NEGATIVE for changes in mood or affect     OBJECTIVE:   /72   Pulse 97   Ht 1.571 m (5' 1.85\")   Wt 86.2 kg (190 lb)   SpO2 99%   BMI 34.92 kg/m    Physical Exam  GENERAL: healthy, alert and no distress  EYES: Eyes grossly normal to inspection, PERRL and conjunctivae and sclerae normal  HENT: ear canals and TM's normal, nose and mouth without ulcers or lesions  NECK: no adenopathy, no asymmetry, masses, or scars and thyroid normal to " "palpation  RESP: lungs clear to auscultation - no rales, rhonchi or wheezes  BREAST: normal without masses, tenderness or nipple discharge and no palpable axillary masses or adenopathy  CV: regular rate and rhythm, normal S1 S2, no S3 or S4, no murmur, click or rub, no peripheral edema and peripheral pulses strong  ABDOMEN: soft, nontender, no hepatosplenomegaly, no masses and bowel sounds normal  MS: no gross musculoskeletal defects noted, no edema  SKIN: no suspicious lesions or rashes  NEURO: Normal strength and tone, mentation intact and speech normal  PSYCH: mentation appears normal, affect normal/bright    Diagnostic Test Results:  Labs reviewed in Epic    ASSESSMENT/PLAN:       ICD-10-CM    1. Routine general medical examination at a health care facility  Z00.00    2. Visit for screening mammogram  Z12.31 MA Screening Digital Bilateral   3. Vitamin D deficiency  E55.9 vitamin D2 (ERGOCALCIFEROL) 26253 units (1250 mcg) capsule   4. Subclinical hypothyroidism  E03.9    5. Iron deficiency anemia, unspecified iron deficiency anemia type  D50.9 ferrous sulfate (SLO-FE) 142 (45 Fe) MG CR tablet   6. Adjustment disorder with depressed mood  F43.21    7. Female fertility problem  N97.9      Patient with history of fertility issues, failed IVF, had lots of issues with mood.  She feels like she does not need therapy or med at this point  Advised increase physical activity, yoga , meditation etc, more engaging in friendships/community  Hypothyroidism-stable, cont med, recheck labs in 6 weeks  Iron def anemia-not take med, she it to start iron tabs    Vit d def-she is to restart vit d        Patient has been advised of split billing requirements and indicates understanding: Yes  COUNSELING:  Reviewed preventive health counseling, as reflected in patient instructions    Estimated body mass index is 34.92 kg/m  as calculated from the following:    Height as of this encounter: 1.571 m (5' 1.85\").    Weight as of this " encounter: 86.2 kg (190 lb).    Weight management plan: Patient referred to endocrine and/or weight management specialty    She reports that she has never smoked. She has never used smokeless tobacco.      Counseling Resources:  ATP IV Guidelines  Pooled Cohorts Equation Calculator  Breast Cancer Risk Calculator  BRCA-Related Cancer Risk Assessment: FHS-7 Tool  FRAX Risk Assessment  ICSI Preventive Guidelines  Dietary Guidelines for Americans, 2010  USDA's MyPlate  ASA Prophylaxis  Lung CA Screening    DO ALISON [facility]

## 2021-01-07 NOTE — PATIENT INSTRUCTIONS
Restart iron, take vit C  Vit D  Mammogram  Diet control, calories and portions  Start exercise 4-5 times a day as planned, Beachbody workout is what I do  Follow up in 6 weeks  Happy new year!  Guy Paulson D.O.    Mammogram Scheduling  [place] [phone] or [phone]  [facility] [phone]    Patient Education         Preventive Health Recommendations  Female Ages 40 to 49    Yearly exam:     See your health care provider every year in order to  1. Review health changes.   2. Discuss preventive care.    3. Review your medicines if your doctor prescribed any.      Get a Pap test every three years (unless you have an abnormal result and your provider advises testing more often).      If you get Pap tests with HPV test, you only need to test every 5 years, unless you have an abnormal result. You do not need a Pap test if your uterus was removed (hysterectomy) and you have not had cancer.      You should be tested each year for STDs (sexually transmitted diseases), if you're at risk.     Ask your doctor if you should have a mammogram.      Have a colonoscopy (test for colon cancer) if someone in your family has had colon cancer or polyps before age 50.       Have a cholesterol test every 5 years.       Have a diabetes test (fasting glucose) after age 45. If you are at risk for diabetes, you should have this test every 3 years.    Shots: Get a flu shot each year. Get a tetanus shot every 10 years.     Nutrition:     Eat at least 5 servings of fruits and vegetables each day.    Eat whole-grain bread, whole-wheat pasta and brown rice instead of white grains and rice.    Get adequate Calcium and Vitamin D.      Lifestyle    Exercise at least 150 minutes a week (an average of 30 minutes a day, 5 days a week). This will help you control your weight and prevent disease.    Limit alcohol to one drink per day.    No smoking.     Wear sunscreen to prevent skin cancer.    See your dentist every six months for an  exam and cleaning.

## 2021-01-13 ENCOUNTER — ANCILLARY PROCEDURE (OUTPATIENT)
Dept: MAMMOGRAPHY | Facility: CLINIC | Age: 41
End: 2021-01-13
Attending: FAMILY MEDICINE
Payer: COMMERCIAL

## 2021-01-13 DIAGNOSIS — Z12.31 VISIT FOR SCREENING MAMMOGRAM: ICD-10-CM

## 2021-01-13 PROCEDURE — 77067 SCR MAMMO BI INCL CAD: CPT | Mod: TC | Performed by: RADIOLOGY

## 2021-01-18 NOTE — RESULT ENCOUNTER NOTE
Mammo results managed by the breast center. Results communicated to the patient by their office.   Guy Paulson D.O.

## 2021-01-19 ENCOUNTER — OFFICE VISIT (OUTPATIENT)
Dept: OPTOMETRY | Facility: CLINIC | Age: 41
End: 2021-01-19
Payer: COMMERCIAL

## 2021-01-19 DIAGNOSIS — Z46.0 CONTACT LENS/GLASSES FITTING: ICD-10-CM

## 2021-01-19 DIAGNOSIS — H52.13 MYOPIA OF BOTH EYES: ICD-10-CM

## 2021-01-19 DIAGNOSIS — Z01.00 ENCOUNTER FOR EXAMINATION OF EYES AND VISION WITHOUT ABNORMAL FINDINGS: Primary | ICD-10-CM

## 2021-01-19 PROCEDURE — 92310 CONTACT LENS FITTING OU: CPT | Performed by: OPTOMETRIST

## 2021-01-19 PROCEDURE — 92014 COMPRE OPH EXAM EST PT 1/>: CPT | Performed by: OPTOMETRIST

## 2021-01-19 PROCEDURE — 92015 DETERMINE REFRACTIVE STATE: CPT | Performed by: OPTOMETRIST

## 2021-01-19 ASSESSMENT — REFRACTION_CURRENTRX
OS_SPHERE: -3.00
OS_BRAND: COOPER BIOFINITY BC 8.6, D 14.0
OS_SPHERE: -3.00
OD_DIAMETER: 14.0
OS_DIAMETER: 14.0
OD_SPHERE: -3.00
OS_BASECURVE: 8.70
OD_BASECURVE: 8.70
OD_BRAND: COOPER BIOFINITY BC 8.6, D 14.0
OD_SPHERE: -3.00

## 2021-01-19 ASSESSMENT — REFRACTION_MANIFEST
OS_CYLINDER: SPHERE
OD_CYLINDER: SPHERE
OD_SPHERE: -3.00
OS_SPHERE: -3.00

## 2021-01-19 ASSESSMENT — TONOMETRY
IOP_METHOD: APPLANATION
OS_IOP_MMHG: 22
OD_IOP_MMHG: 22

## 2021-01-19 ASSESSMENT — CONF VISUAL FIELD
OD_NORMAL: 1
OS_NORMAL: 1

## 2021-01-19 ASSESSMENT — CUP TO DISC RATIO
OS_RATIO: 0.35
OD_RATIO: 0.35

## 2021-01-19 ASSESSMENT — VISUAL ACUITY
OD_CC: J1
OS_CC: J1+
METHOD: SNELLEN - LINEAR
OS_CC: 20/20
OD_CC: 20/20
CORRECTION_TYPE: CONTACTS

## 2021-01-19 ASSESSMENT — SLIT LAMP EXAM - LIDS
COMMENTS: NORMAL
COMMENTS: NORMAL

## 2021-01-19 ASSESSMENT — EXTERNAL EXAM - RIGHT EYE: OD_EXAM: NORMAL

## 2021-01-19 ASSESSMENT — EXTERNAL EXAM - LEFT EYE: OS_EXAM: NORMAL

## 2021-01-19 NOTE — LETTER
1/19/2021         RE: Ines Gaitan  [address]  [place] [address]        Dear Colleague,    Thank you for referring your patient, Ines Gaitan, to the [facility]. Please see a copy of my visit note below.    Chief Complaint   Patient presents with     Annual Eye Exam       Previous contact lens wearer? Yes:   Comfort of contact lenses :satisfied  Satisfied with current lenses: Yes; wears dailies and monthly lenses (unsure of which monthly lens, prescription was from outside provider)        Last Eye Exam: 2 years ago at [facility]  Dilated Previously: Yes    What are you currently using to see?  Contacts, she broke her glasses 2 months ago.    Distance Vision Acuity: Satisfied with vision    **patient reports a single episode where it seemed she could not read the last few letters of words on street signs, did not feel comfortable driving; patient was very tired and after the episode slept for a long time; after resting the vision return to normal; may have had an accompanying headache?    Near Vision Acuity: Not satisfied, recently she is having problems reading small print.    Eye Comfort: good  Do you use eye drops? : No     Medical, surgical and family histories reviewed and updated 1/19/2021.       OBJECTIVE: See Ophthalmology exam    ASSESSMENT:    ICD-10-CM    1. Encounter for examination of eyes and vision without abnormal findings  Z01.00    2. Myopia of both eyes  H52.13    3. Contact lens/glasses fitting  Z46.0       PLAN:     Patient Instructions   Ocular health within normal limits.   Episode of blind spot in vision likely ocular migraine variant. If episodes continue, I recommend further evaluation with primary care or neurology.     Updated glasses prescription provided today.     Updated contact lens prescription for dailies (AquaComfort Plus) provided today.   Patient would also like prescription for monthly contact lenses.   Begin trial of Biofinity contact  lenses. If adequate comfort/vision with contact lenses, we can finalize the prescription. If not, notify me and we can try a different lens.     Return in 1 year for comprehensive eye exam, or sooner if needed.     The effects of the dilating drops last for 4- 6 hours.  You will be more sensitive to light and vision will be blurry up close.  Mydriatic sunglasses were given if needed.    Ehsan Saini OD  [address] [facility] [place]  [address] [phone]                             Again, thank you for allowing me to participate in the care of your patient.        Sincerely,        Ehsan Saini OD

## 2021-01-19 NOTE — PROGRESS NOTES
Chief Complaint   Patient presents with     Annual Eye Exam       Previous contact lens wearer? Yes:   Comfort of contact lenses :satisfied  Satisfied with current lenses: Yes; wears dailies and monthly lenses (unsure of which monthly lens, prescription was from outside provider)        Last Eye Exam: 2 years ago at [facility]  Dilated Previously: Yes    What are you currently using to see?  Contacts, she broke her glasses 2 months ago.    Distance Vision Acuity: Satisfied with vision    **patient reports a single episode where it seemed she could not read the last few letters of words on street signs, did not feel comfortable driving; patient was very tired and after the episode slept for a long time; after resting the vision return to normal; may have had an accompanying headache?    Near Vision Acuity: Not satisfied, recently she is having problems reading small print.    Eye Comfort: good  Do you use eye drops? : No     Medical, surgical and family histories reviewed and updated 1/19/2021.       OBJECTIVE: See Ophthalmology exam    ASSESSMENT:    ICD-10-CM    1. Encounter for examination of eyes and vision without abnormal findings  Z01.00    2. Myopia of both eyes  H52.13    3. Contact lens/glasses fitting  Z46.0       PLAN:     Patient Instructions   Ocular health within normal limits.   Episode of blind spot in vision likely ocular migraine variant. If episodes continue, I recommend further evaluation with primary care or neurology.     Updated glasses prescription provided today.     Updated contact lens prescription for dailies (AquaComfort Plus) provided today.   Patient would also like prescription for monthly contact lenses.   Begin trial of Biofinity contact lenses. If adequate comfort/vision with contact lenses, we can finalize the prescription. If not, notify me and we can try a different lens.     Return in 1 year for comprehensive eye exam, or sooner if needed.     The effects of the dilating drops  last for 4- 6 hours.  You will be more sensitive to light and vision will be blurry up close.  Mydriatic sunglasses were given if needed.    Ehsan Saini, [address]. [place]  [address]    [phone]

## 2021-01-19 NOTE — PATIENT INSTRUCTIONS
Ocular health within normal limits.   Episode of blind spot in vision likely ocular migraine variant. If episodes continue, I recommend further evaluation with primary care or neurology.     Updated glasses prescription provided today.     Updated contact lens prescription for dailies (AquaComfort Plus) provided today.   Patient would also like prescription for monthly contact lenses.   Begin trial of Biofinity contact lenses. If adequate comfort/vision with contact lenses, we can finalize the prescription. If not, notify me and we can try a different lens.     Return in 1 year for comprehensive eye exam, or sooner if needed.     The effects of the dilating drops last for 4- 6 hours.  You will be more sensitive to light and vision will be blurry up close.  Mydriatic sunglasses were given if needed.    Ehsan Saini, OD  [facility]  [address] [place] [address]. [place]  [phone]    [phone]

## 2021-04-09 ENCOUNTER — OFFICE VISIT (OUTPATIENT)
Dept: OBGYN | Facility: CLINIC | Age: 41
End: 2021-04-09
Payer: COMMERCIAL

## 2021-04-09 ENCOUNTER — TELEPHONE (OUTPATIENT)
Dept: FAMILY MEDICINE | Facility: CLINIC | Age: 41
End: 2021-04-09

## 2021-04-09 VITALS
OXYGEN SATURATION: 99 % | HEART RATE: 91 BPM | WEIGHT: 180 LBS | DIASTOLIC BLOOD PRESSURE: 80 MMHG | SYSTOLIC BLOOD PRESSURE: 124 MMHG | BODY MASS INDEX: 33.08 KG/M2

## 2021-04-09 DIAGNOSIS — Z31.41 FERTILITY TESTING: ICD-10-CM

## 2021-04-09 DIAGNOSIS — R93.89 ENDOMETRIAL THICKENING ON ULTRASOUND: Primary | ICD-10-CM

## 2021-04-09 DIAGNOSIS — N97.9 FEMALE INFERTILITY: ICD-10-CM

## 2021-04-09 PROCEDURE — 99214 OFFICE O/P EST MOD 30 MIN: CPT | Performed by: OBSTETRICS & GYNECOLOGY

## 2021-04-09 RX ORDER — CLOMIPHENE CITRATE 50 MG/1
50 TABLET ORAL DAILY
Qty: 5 TABLET | Refills: 0 | Status: SHIPPED | OUTPATIENT
Start: 2021-04-09 | End: 2021-12-07

## 2021-04-09 NOTE — TELEPHONE ENCOUNTER
Please notify patient and proceed with obtaining pre-authorization for Clomid due to infertility and recurrent spontaneous .   Quinten Belle MD

## 2021-04-09 NOTE — TELEPHONE ENCOUNTER
Prior authorization required for : Clomiphene Citrate 50 mg tablet  Insurance plan: [facility]  Patient Id:[id number]  Bin Number:[id number]  [id number]: [id number]  Help Desk Number:  [phone]    Please fax over an alternative medication to the pharmacy or if you are going to pursue a prior authorization please forward this to your appropriate prior authorization team and contact the pharmacy and patient when approved. Thanks!    Juani Sepulveda Octavia  [facility]

## 2021-04-09 NOTE — PATIENT INSTRUCTIONS
If you have any questions regarding your visit, Please contact your care team.     [facility] [employer] [facility]: [phone]  Women s Health CLINIC HOURS TELEPHONE NUMBER       Quinten Belle M.D.    Ghazala-AASHISH Davila-AASHISH Reynaga-Medical Assistant    Radha-  Chantelle-     Monday-[place]  8:00a.m-4:45 p.m  Tuesday-[place]  9:00a.m-4:00 p.m  Wednesday-[place] 8:00a.m-4:45 p.m.  Thursday-[place]  8:00a.m-4:45 p.m.  Friday-[place]  8:00a.m-4:45 p.m. [facility]  [address] [facility] [place] [address] [phone] ask for [facility]  [phone] Fax  Imaging [phone]    [facility]  [address]  [place], [place] [address] [phone]    [facility]  [address] [place]  [place] [address] [phone] ask [facility]  [phone] Fax  Imaging [phone]     Urgent Care locations:    [place]        [place] Monday-Friday  5 pm - 9 pm  Saturday and Sunday   9 am - 5 pm  Monday-Friday   11 am - 9 pm  Saturday and Sunday   9 am - 5 pm   [phone]   If you need a medication refill, please contact your pharmacy. Please allow 3 business days for your refill to be completed.  As always, Thank you for trusting us with your healthcare needs!

## 2021-04-09 NOTE — TELEPHONE ENCOUNTER
Spoke with Insurance, unfortunately, fertility medication is excluded from her plan. No alternative was given. Pt will have to pay out of pocket for it if needed, per pharmacy rep aHylee. Otherwise, will need to appeal but that can take days to week for approval. Please advise if we should have pt pay out of pocket for it or initiate appeal.     [facility] Appeal #: [phone]

## 2021-04-10 NOTE — PROGRESS NOTES
OB-GYN Problem-Oriented Visit or Consultation      Ines Gaitan is a 40 year old year old P 0 who presents with a chief complaint of infertility follow-up.  Referred by self.  Patient's last menstrual period was 02/16/2021. LMP 3/17.     Assessment:   Encounter Diagnoses   Name Primary?     Endometrial thickening on ultrasound Yes     Fertility testing      Female infertility          Plan and Recommendations: May follow-up with RE, Dr. Neff for repeat IVF with donor egg or own egg and PIGT again. Currently patient is deferring this. Desires to try again with Clomid- tolerated well before. Recheck D5 pelvic ultrasound to assess for ovarian endometriomas or endometrial thickening. Check  D21P and TSH (currently off levothyroxine).   Suggest supportive care for depression.   Medications and prescriptions given as noted.  I reviewed side effects, risks, benefits and instructions on proper use.  I reviewed the condition, causes, differential diagnosis, prognosis, evaluation and management considerations and options.  Questions answered and information given. See orders.   35 minutes spent on the date of encounter doing chart review, history, discussion with patient, and documentation, and further activities as noted, and review of appropriateness of decision-making for care.    A/P:  Ines was seen today for infertililty.    Diagnoses and all orders for this visit:    Endometrial thickening on ultrasound  -     US Pelvic Complete with Transvaginal; Future    Fertility testing  -     US Pelvic Complete with Transvaginal; Future  -     TSH with free T4 reflex; Future    Female infertility  -     clomiPHENE (CLOMID) 50 MG tablet; Take 1 tablet (50 mg) by mouth daily On cycle days 3-7  -     Progesterone; Future        Quinten Belle MD    HPI:     See prior notes. Had laparoscopy with left salpingectomy by me in 2019 for small hydrosalpinx and prior to IVF/ET with PIGT by Dr. Neff. Noted possible single spot of  endometriosis at laparoscopy. Had failed ET in 3/20 and 10/20. Also had Lupron and letrozole x 3 months last yr between attempts and endometrial biopsy was done due to some endometrial thickening on ultrasound. This was apparently neg. Concern for endometriosis was also raised and patient has had some dyspareunia; it did seem better when on Lupron. Donor egg advised by RE. Patient has put this on hold. Patient feels depressed concerning her infertility.     Past medical, obstetrical, surgical, family and social history reviewed and as noted or updated in chart.     Allergies, meds and supplements are as noted or updated in chart.      ROS:   Systems reviewed include:  constitutional, gastrointestinal, genitourinary, psychological, hematologic/lymphatic and endocrine.    These systems were negative for significant symptoms except for the following additional: none; see HPI.    EXAM:  VS as noted. /80 (BP Location: Left arm, Patient Position: Chair, Cuff Size: Adult Regular)   Pulse 91   Wt 81.6 kg (180 lb)   LMP 02/16/2021   SpO2 99%   Breastfeeding No   BMI 33.08 kg/m    Constitutionally normal.     Exam not repeated at this time.    Quinten Belle MD

## 2021-04-10 NOTE — TELEPHONE ENCOUNTER
Please notify patient that she will need to pay out of pocket for this. I think she understands this.   Quinten Belle MD

## 2021-04-12 ENCOUNTER — TELEPHONE (OUTPATIENT)
Dept: OBGYN | Facility: CLINIC | Age: 41
End: 2021-04-12

## 2021-04-12 NOTE — TELEPHONE ENCOUNTER
[facility] Call [facility]    Phone Message    May a detailed message be left on voicemail: no     Reason for Call: Other: they are calling and this med was denied clomiPHENE (CLOMID) 50 MG tablet [1524] (Order [id number]), they are sending fax over for this, please advise        Case id [id number]    Travel Screening: Not Applicable

## 2021-04-12 NOTE — TELEPHONE ENCOUNTER
Per encounter from 4/9/2021 medication is not covered and patient will need to pay out of pocket.  Patient was informed that she would need to pay out of pocket.      Diann Pizano, CMA  April 12, 2021

## 2021-04-19 ENCOUNTER — ANCILLARY PROCEDURE (OUTPATIENT)
Dept: ULTRASOUND IMAGING | Facility: CLINIC | Age: 41
End: 2021-04-19
Attending: OBSTETRICS & GYNECOLOGY
Payer: COMMERCIAL

## 2021-04-19 DIAGNOSIS — R93.89 ENDOMETRIAL THICKENING ON ULTRASOUND: ICD-10-CM

## 2021-04-19 DIAGNOSIS — Z31.41 FERTILITY TESTING: ICD-10-CM

## 2021-04-19 PROCEDURE — 76830 TRANSVAGINAL US NON-OB: CPT | Mod: GC | Performed by: RADIOLOGY

## 2021-04-19 PROCEDURE — 76856 US EXAM PELVIC COMPLETE: CPT | Mod: GC | Performed by: RADIOLOGY

## 2021-05-05 ENCOUNTER — MYC MEDICAL ADVICE (OUTPATIENT)
Dept: OBGYN | Facility: CLINIC | Age: 41
End: 2021-05-05

## 2021-05-05 DIAGNOSIS — N97.9 FEMALE INFERTILITY: ICD-10-CM

## 2021-05-05 DIAGNOSIS — Z31.41 FERTILITY TESTING: ICD-10-CM

## 2021-05-05 DIAGNOSIS — E03.8 SUBCLINICAL HYPOTHYROIDISM: Primary | ICD-10-CM

## 2021-05-05 LAB
PROGEST SERPL-MCNC: 24.6 NG/ML
TSH SERPL DL<=0.005 MIU/L-ACNC: 3.77 MU/L (ref 0.4–4)

## 2021-05-05 PROCEDURE — 84443 ASSAY THYROID STIM HORMONE: CPT | Performed by: OBSTETRICS & GYNECOLOGY

## 2021-05-05 PROCEDURE — 84144 ASSAY OF PROGESTERONE: CPT | Performed by: OBSTETRICS & GYNECOLOGY

## 2021-05-05 PROCEDURE — 36415 COLL VENOUS BLD VENIPUNCTURE: CPT | Performed by: OBSTETRICS & GYNECOLOGY

## 2021-05-06 ENCOUNTER — MYC MEDICAL ADVICE (OUTPATIENT)
Dept: OBGYN | Facility: CLINIC | Age: 41
End: 2021-05-06

## 2021-05-06 RX ORDER — LEVOTHYROXINE SODIUM 25 UG/1
25 TABLET ORAL DAILY
Qty: 90 TABLET | Refills: 1 | Status: SHIPPED | OUTPATIENT
Start: 2021-05-06 | End: 2021-12-07

## 2021-05-06 NOTE — TELEPHONE ENCOUNTER
Per 5/5 TSH lab result note:  Viewed by Ines Gaitan on 5/5/2021 10:41 PM  Written by Quinten Belle MD on 5/5/2021  5:17 PM  This does suggest ovulation occurred this cycle.   Thyroid stimulating hormone is acceptable but at upper normal range; target is 2.5 or less if pregnant.     Satisfactory results- continue with same treatment and plan as discussed.

## 2021-05-06 NOTE — TELEPHONE ENCOUNTER
Pt's response to Dr. Belle' mychart message to her in the 5/5 mychart encounter.    Lola Jarrell RN

## 2021-07-15 ENCOUNTER — MYC MEDICAL ADVICE (OUTPATIENT)
Dept: OBGYN | Facility: CLINIC | Age: 41
End: 2021-07-15

## 2021-07-15 DIAGNOSIS — N97.0 FEMALE INFERTILITY ASSOCIATED WITH ANOVULATION: Primary | ICD-10-CM

## 2021-07-16 RX ORDER — CLOMIPHENE CITRATE 50 MG/1
50 TABLET ORAL DAILY
Qty: 5 TABLET | Refills: 2 | Status: SHIPPED | OUTPATIENT
Start: 2021-07-16 | End: 2021-12-07

## 2021-07-16 NOTE — TELEPHONE ENCOUNTER
Clomid prescription refill sent and suggest that we continue the 50 mg dose at which she ovulated. A higher dose may produce adverse effects on cervical mucus. Check a cycle day 21-22 progesterone along with a follow-up TSH then. Please notify.     Quinten Belle MD

## 2021-07-16 NOTE — TELEPHONE ENCOUNTER
Pt last seen 4/9/2021 for endometrial thickening and to restart clomid. Pt stated she took a break from clomid due to receiving COVID vaccine.     Pt started her period today, cycle day 1. Pt asking for clomid prescription to be sent to [facility].    RN routing to provider for advisement on refill request.    Ghazala Villareal RN on 7/16/2021 at 7:58 AM

## 2021-10-03 ENCOUNTER — HEALTH MAINTENANCE LETTER (OUTPATIENT)
Age: 41
End: 2021-10-03

## 2021-12-07 ENCOUNTER — VIRTUAL VISIT (OUTPATIENT)
Dept: FAMILY MEDICINE | Facility: CLINIC | Age: 41
End: 2021-12-07
Payer: COMMERCIAL

## 2021-12-07 DIAGNOSIS — R30.0 DYSURIA: Primary | ICD-10-CM

## 2021-12-07 PROCEDURE — 99213 OFFICE O/P EST LOW 20 MIN: CPT | Mod: 95 | Performed by: FAMILY MEDICINE

## 2021-12-07 RX ORDER — NITROFURANTOIN 25; 75 MG/1; MG/1
100 CAPSULE ORAL 2 TIMES DAILY
Qty: 10 CAPSULE | Refills: 0 | Status: SHIPPED | OUTPATIENT
Start: 2021-12-07 | End: 2021-12-12

## 2021-12-07 NOTE — PROGRESS NOTES
Ines is a 41 year old who is being evaluated via a billable telephone visit.      How would you like to obtain your AVS? Emiliano  If the video visit is dropped, the invitation should be resent by: Emiliano or else Text to cell phone: [phone]  Will anyone else be joining your video visit? No      Assessment & Plan     Dysuria  Symptoms are consistent with a UTI.  She was given a prescription for nitrofurantoin and I encouraged her to push fluids.  If symptoms do not improve she will schedule follow-up appointment.  - nitroFURantoin macrocrystal-monohydrate (MACROBID) 100 MG capsule; Take 1 capsule (100 mg) by mouth 2 times daily for 5 days                 Return in about 2 weeks (around 12/21/2021) for Follow up if symptoms not improving..    Peter Morrow, [facility]   Ines is a 41 year old who presents for the following health issues     HPI     Genitourinary - Female  Onset/Duration: x1 day  Description:   Painful urination (Dysuria): YES            Frequency: YES and urgency  Blood in urine (Hematuria): YES  Delay in urine (Hesitency): no  Intensity: mild  Progression of Symptoms:  worsening  Accompanying Signs & Symptoms:  Fever/chills: no  Flank pain: no  Nausea and vomiting: no  Vaginal symptoms: none  Abdominal/Pelvic Pain: no  History:   History of frequent UTI s: YES in the past d/t endometriosis  History of kidney stones: no  Sexually Active: YES  Possibility of pregnancy: no  Precipitating or alleviating factors: none  Therapies tried and outcome: cranberry juice - not helpful so far        Review of Systems   Constitutional, HEENT, cardiovascular, pulmonary, gi and gu systems are negative, except as otherwise noted.      Objective       Vitals:  No vitals were obtained today due to virtual visit.    Physical Exam   GENERAL: Healthy, alert and no distress  RESP: No audible wheeze, cough.    PSYCH: Mentation appears normal, affect normal/bright,  judgement and insight intact, normal speech                 Telephone-Visit Details    Type of service:  This visit had to be switched to a telephone visit because her video was not working.    Length of telephone visit: 7 minutes    Originating Location (pt. Location): Home    Distant Location (provider location):  [facility]

## 2022-03-20 ENCOUNTER — HEALTH MAINTENANCE LETTER (OUTPATIENT)
Age: 42
End: 2022-03-20

## 2022-04-04 ENCOUNTER — OFFICE VISIT (OUTPATIENT)
Dept: FAMILY MEDICINE | Facility: CLINIC | Age: 42
End: 2022-04-04
Payer: COMMERCIAL

## 2022-04-04 ENCOUNTER — ANCILLARY PROCEDURE (OUTPATIENT)
Dept: GENERAL RADIOLOGY | Facility: CLINIC | Age: 42
End: 2022-04-04
Attending: PHYSICIAN ASSISTANT
Payer: COMMERCIAL

## 2022-04-04 VITALS
SYSTOLIC BLOOD PRESSURE: 122 MMHG | RESPIRATION RATE: 16 BRPM | WEIGHT: 181 LBS | DIASTOLIC BLOOD PRESSURE: 74 MMHG | HEIGHT: 63 IN | TEMPERATURE: 97.9 F | HEART RATE: 81 BPM | BODY MASS INDEX: 32.07 KG/M2 | OXYGEN SATURATION: 99 %

## 2022-04-04 DIAGNOSIS — M25.551 HIP PAIN, RIGHT: Primary | ICD-10-CM

## 2022-04-04 DIAGNOSIS — M25.551 HIP PAIN, RIGHT: ICD-10-CM

## 2022-04-04 PROCEDURE — 99214 OFFICE O/P EST MOD 30 MIN: CPT | Performed by: PHYSICIAN ASSISTANT

## 2022-04-04 PROCEDURE — 72170 X-RAY EXAM OF PELVIS: CPT | Performed by: RADIOLOGY

## 2022-04-04 ASSESSMENT — PAIN SCALES - GENERAL: PAINLEVEL: MILD PAIN (3)

## 2022-04-04 NOTE — PROGRESS NOTES
Assessment & Plan     Hip pain, right  Patient is a 41-year-old female presents to clinic due to right hip pain ongoing for the last 3 weeks.  Patient started Pilates 4 weeks ago and has been doing Pilates daily at [employer] since then.  Vital signs normal.  Based on history and physical exam findings, symptoms may be due to overuse, inflammation, or possible hip impingement.  X-ray completed suggesting possible impingement.  Given sudden rapid increase in activity and location of pain, overuse/inflammation is likely contributing.  Patient would like to continue Pilates as much as possible.  Recommended discussing symptoms with instructors to modify activities.  Also recommended ice, Tylenol/ibuprofen, and physical therapy.  Return precautions provided.  - XR Pelvis 1/2 Views; Future  - Physical Therapy Referral; Future    See Patient Instructions    Return in about 4 weeks (around 5/2/2022), or if symptoms worsen or fail to improve.    ENRIQUE Hull [facility] VICKEY Chacon is a 41 year old who presents for the following health issues     HPI     Joint Pain    Onset: 3 weeks of right hip pain. Around 1 month ago patient started doing pilates and has been doing this every day at [employer]. At the time she had pain over the superior/anterior aspect of iliac crest. When activity increases the pain increased. Pain rated 6-7/10. At the end of the day, when patient sits, she has pain in the area. Pain is sharp and sudden. Walking aggravates symptoms.     Description:   Location: Right hip  Character: Sharp  Intensity: moderate    Progression of Symptoms: worse    Accompanying Signs & Symptoms:  Other symptoms: none    History:   Previous similar pain: no       Precipitating factors:   Trauma or overuse: No but she notes that she recently started doing pilates    Alleviating factors:  Improved by: Icy Hot    Therapies Tried and outcome: Icy Hot            Objective    BP  "122/74   Pulse 81   Temp 97.9  F (36.6  C) (Tympanic)   Resp 16   Ht 1.6 m (5' 3\")   Wt 82.1 kg (181 lb)   LMP  (LMP Unknown)   SpO2 99%   BMI 32.06 kg/m    Body mass index is 32.06 kg/m .  Physical Exam  Vitals and nursing note reviewed.   Constitutional:       General: She is not in acute distress.     Appearance: Normal appearance.   HENT:      Head: Normocephalic and atraumatic.   Eyes:      Extraocular Movements: Extraocular movements intact.      Pupils: Pupils are equal, round, and reactive to light.   Cardiovascular:      Rate and Rhythm: Normal rate.   Pulmonary:      Effort: Pulmonary effort is normal.   Musculoskeletal:         General: Normal range of motion.      Cervical back: Normal range of motion.      Comments: Sensation intact to bilateral lower extremities.  Cautious gait.  Hip ROM WNL.  Tenderness to palpation over anterior/superior iliac crest.  No tenderness to palpation over hip or hip flexor tendon.  Symptoms reproduced with passive hip flexion/compression.  Strength intact and equal bilaterally in flexion, abduction, abduction.   Skin:     General: Skin is warm and dry.   Neurological:      General: No focal deficit present.      Mental Status: She is alert.   Psychiatric:         Mood and Affect: Mood normal.         Behavior: Behavior normal.            XR, pelvis:  IMPRESSION: No acute displaced pelvic or proximal femur fracture  identified. Bilateral coxa profundus/acetabular over coverage. No  significant hip joint space narrowing. Symmetric SI joints.              "

## 2022-04-04 NOTE — PATIENT INSTRUCTIONS
Good news, your x-ray does not show any worrisome findings.  Your symptoms may be due to increase your exercise too quickly.  Please talk to your Pilates instructors to see if there are different modifications you can do to help decrease pain.  You can also use Tylenol and heat/ice for pain relief.  I have placed a referral to physical therapy.  The scheduling team will be call you to set up your appointment.  Please reach out with questions or concerns.  Return to clinic for any new or worsening symptoms.

## 2022-04-26 ENCOUNTER — THERAPY VISIT (OUTPATIENT)
Dept: PHYSICAL THERAPY | Facility: CLINIC | Age: 42
End: 2022-04-26
Payer: COMMERCIAL

## 2022-04-26 DIAGNOSIS — M25.551 HIP PAIN, RIGHT: ICD-10-CM

## 2022-04-26 PROCEDURE — 97110 THERAPEUTIC EXERCISES: CPT | Mod: GP | Performed by: PHYSICAL THERAPIST

## 2022-04-26 PROCEDURE — 97161 PT EVAL LOW COMPLEX 20 MIN: CPT | Mod: GP | Performed by: PHYSICAL THERAPIST

## 2022-04-26 ASSESSMENT — ACTIVITIES OF DAILY LIVING (ADL)
ROLLING_OVER_IN_BED: MODERATE DIFFICULTY
SITTING_FOR_15_MINUTES: MODERATE DIFFICULTY
LIGHT_TO_MODERATE_WORK: SLIGHT DIFFICULTY
GETTING_INTO_AND_OUT_OF_AN_AVERAGE_CAR: SLIGHT DIFFICULTY
WALKING_INITIALLY: SLIGHT DIFFICULTY
RECREATIONAL_ACTIVITIES: NO DIFFICULTY AT ALL
TWISTING/PIVOTING_ON_INVOLVED_LEG: NO DIFFICULTY AT ALL
GOING_DOWN_1_FLIGHT_OF_STAIRS: NO DIFFICULTY AT ALL
STEPPING_UP_AND_DOWN_CURBS: NO DIFFICULTY AT ALL
PUTTING_ON_SOCKS_AND_SHOES: SLIGHT DIFFICULTY
HEAVY_WORK: SLIGHT DIFFICULTY
DEEP_SQUATTING: MODERATE DIFFICULTY
STANDING_FOR_15_MINUTES: NO DIFFICULTY AT ALL
HOS_ADL_HIGHEST_POTENTIAL_SCORE: 68
WALKING_15_MINUTES_OR_GREATER: SLIGHT DIFFICULTY
GOING_UP_1_FLIGHT_OF_STAIRS: NO DIFFICULTY AT ALL
GETTING_INTO_AND_OUT_OF_A_BATHTUB: MODERATE DIFFICULTY
WALKING_UP_STEEP_HILLS: SLIGHT DIFFICULTY
HOS_ADL_ITEM_SCORE_TOTAL: 56
HOS_ADL_COUNT: 17
WALKING_APPROXIMATELY_10_MINUTES: NO DIFFICULTY AT ALL
HOS_ADL_SCORE(%): 82.35
WALKING_DOWN_STEEP_HILLS: NO DIFFICULTY AT ALL

## 2022-04-26 NOTE — PROGRESS NOTES
Physical Therapy Initial Evaluation  Subjective:  The history is provided by the patient. No  was used.   Therapist Generated HPI Evaluation  Problem details: Pt comes to therapy today for right hip pain beginning about 1-2 months ago after starting a new pilates workout routine. Pain is intensified by increased activity levels and following prolonged sitting and transitions as well as turning in her bed. Denies any previous hip pain. Pain at rest is rated at 3/10.  6/10 at its worst with aforementioned activities. Pain has remained the same since onset. Has reduced her times per week performing pilates but that has not helped her pain levels as much. Not currently taking any medications or utilizing ice/heat for the hip though referring provided did recommend OTC meds and ice/heat. Pt reports prolonged walking at work is mildly aggravating as well as bending and lifting tasks. Pt's goals are to improve pain levels with prolonged walking, squatting/bending, and return to pilates and general wellness routine without pain..         Type of problem:  Right hip.    This is a new condition.  Condition occurred with:  Insidious onset and repetition/overuse.  Where condition occurred: during recreation/sport.  Patient reports pain:  Greater trochanter and lateral.  Pain is described as aching and sharp and is intermittent.  Pain radiates to:  No radiation. Pain is worse in the A.M..  Since onset symptoms are gradually worsening (now stable).  Associated symptoms:  Loss of strength. Symptoms are exacerbated by activity, bending/squatting, transfers and sitting (worse with standing following prolonged sitting)  and relieved by rest.      Barriers include:  None as reported by patient.    Patient Health History  Ines Gaitan being seen for Right hip pain .     Date of Onset: 4/4/22 Referral Date. Began secondary to starting pilates classes after being quite sedentary.   Problem occurred: insidious onset  form starting new workout routine   Pain is reported as 4/10 on pain scale.  General health as reported by patient is fair.  Pertinent medical history includes: anemia and overweight.   Red flags:  None as reported by patient.  Medical allergies: none.       Current medications:  None.    Current occupation is .   Primary job tasks include:  Computer work, pushing/pulling, driving, repetitive tasks, lifting/carrying and prolonged standing.                                    Objective:  Standing Alignment:        Lumbar:  Normal            Gait:  Mild hip drop on R while in stance phase    Deviations:  Hip:  Trendelenberg R               Lumbar/SI Evaluation  ROM:  AROM Lumbar: normal      Lumbar Myotomes:  normal                Lumbar Dermtomes:  normal                Neural Tension/Mobility:  Lumbar:  Normal                                                  Hip Evaluation  HIP AROM:    Flexion: Left:   Right:  WNL    Extension: Left:   Right:  0  Abduction: Left:    Right:  30    Adduction: Left:   Right: WNL  Internal Rotation: Left:   Right: WNL  External Rotation: Left:   Right: WNL      Hip PROM:    Flexion: Left:  Right: WNL                Pain: pain present with flexion and external rotation actively and passively         Hip Strength:    Flexion:   Right: 4-/5    Pain: weak/painful                    Extension:  Right: 4-/5     Pain: weak/painful    Abduction:  Right: 4-/5     Pain:weak/painful  Adduction:  Right: 5/5    Pain:strong/pain free  Internal Rotation:  Right: 5-/5    Pain:strong/painful  External Rotation:  Right: 4-/5    Pain: weak/painful            Hip Special Testing:      Right hip negative for the following special tests:  Piriformis; Rony; Fadir/Labrum; Distraction or SLR    Hip Palpation:      Right hip tenderness present at:  Greater Trachanter; IT Band; hip flexors; ASIS; Iliac Crest and Gluteus Medius             General     ROS    Assessment/Plan:    Patient is a 41  year old female with right side hip complaints.    Patient has the following significant findings with corresponding treatment plan.                Diagnosis 1:  Right hip pain   Pain -  hot/cold therapy, manual therapy, self management and education  Decreased ROM/flexibility - manual therapy and therapeutic exercise  Decreased strength - therapeutic exercise and therapeutic activities  Impaired muscle performance - neuro re-education  Decreased function - therapeutic activities    Therapy Evaluation Codes:   1) History comprised of:   Personal factors that impact the plan of care:      None.    Comorbidity factors that impact the plan of care are:      None.     Medications impacting care: None.  2) Examination of Body Systems comprised of:   Body structures and functions that impact the plan of care:      Hip.   Activity limitations that impact the plan of care are:      Bending, Lifting, Squatting/kneeling, Standing, Walking and Working.  3) Clinical presentation characteristics are:   Stable/Uncomplicated.  4) Decision-Making    Low complexity using standardized patient assessment instrument and/or measureable assessment of functional outcome.  Cumulative Therapy Evaluation is: Low complexity.    Previous and current functional limitations:  (See Goal Flow Sheet for this information)    Short term and Long term goals: (See Goal Flow Sheet for this information)     Communication ability:  Patient appears to be able to clearly communicate and understand verbal and written communication and follow directions correctly.  Treatment Explanation - The following has been discussed with the patient:   RX ordered/plan of care  Anticipated outcomes  Possible risks and side effects  This patient would benefit from PT intervention to resume normal activities.   Rehab potential is good.    Frequency:  1 X week, once daily  Duration:  for 6 weeks  Discharge Plan:  Achieve all LTG.  Independent in home treatment  program.  Reach maximal therapeutic benefit.    Please refer to the daily flowsheet for treatment today, total treatment time and time spent performing 1:1 timed codes.

## 2022-05-11 NOTE — PROGRESS NOTES
SUBJECTIVE:   CC: Ines Gaitan is an 41 year old woman who presents for preventive health visit.       Patient has been advised of split billing requirements and indicates understanding: Yes  Healthy Habits:     Getting at least 3 servings of Calcium per day:  Yes    Bi-annual eye exam:  Yes    Dental care twice a year:  NO    Sleep apnea or symptoms of sleep apnea:  None    Diet:  Regular (no restrictions)    Frequency of exercise:  4-5 days/week    Duration of exercise:  45-60 minutes    Taking medications regularly:  Yes    PHQ-2 Total Score: 4    Additional concerns today:  No    piliates 5-6 days a week, she is having some hip pain, went to physical therapy and is doing better, she is doing some home exercise, helping with pain    6 days a week pm shifs at the restaurants      Hypothyroidism Follow-up      Since last visit, patient describes the following symptoms: Weight stable, no hair loss, no skin changes, no constipation, no loose stools  She is vegitarian  Not taking iron pills  Periods are not heavy, monthly last 2-3 days ,not heavy  Vit D not taking     Today's PHQ-2 Score:   PHQ-2 ( 1999 [employer]) 5/12/2022   Q1: Little interest or pleasure in doing things 2   Q2: Feeling down, depressed or hopeless 2   PHQ-2 Score 4   PHQ-2 Total Score (12-17 Years)- Positive if 3 or more points; Administer PHQ-A if positive -   Q1: Little interest or pleasure in doing things More than half the days   Q2: Feeling down, depressed or hopeless More than half the days   PHQ-2 Score 4       Abuse: Current or Past (Physical, Sexual or Emotional) - No  Do you feel safe in your environment? Yes    Have you ever done Advance Care Planning? (For example, a Health Directive, POLST, or a discussion with a medical provider or your loved ones about your wishes): No, advance care planning information given to patient to review.  Patient plans to discuss their wishes with loved ones or provider.      Social History     Tobacco Use      Smoking status: Never Smoker     Smokeless tobacco: Never Used   Substance Use Topics     Alcohol use: No         Alcohol Use 2022   Prescreen: >3 drinks/day or >7 drinks/week? No   Prescreen: >3 drinks/day or >7 drinks/week? -       Reviewed orders with patient.  Reviewed health maintenance and updated orders accordingly - Yes  BP Readings from Last 3 Encounters:   22 120/70   22 122/74   21 124/80    Wt Readings from Last 3 Encounters:   22 80.3 kg (177 lb)   22 82.1 kg (181 lb)   21 81.6 kg (180 lb)                    Breast Cancer Screening:  Any new diagnosis of family breast, ovarian, or bowel cancer? Yes         FHS-7: No flowsheet data found.  click delete button to remove this line now  Mammogram Screening - Offered annual screening and updated Health Maintenance for mutual plan based on risk factor consideration    Pertinent mammograms are reviewed under the imaging tab.    History of abnormal Pap smear: NO - age 30- 65 PAP every 3 years recommended  PAP / HPV Latest Ref Rng & Units 3/4/2019 9/15/2016 2014   PAP (Historical) - NIL NIL ASC-US(A)   HPV16 NEG:Negative Negative Negative -   HPV18 NEG:Negative Negative Negative -   HRHPV NEG:Negative Negative Negative -     Reviewed and updated as needed this visit by clinical staff   Tobacco  Allergies  Meds   Med Hx  Surg Hx  Fam Hx  Soc Hx          Reviewed and updated as needed this visit by Provider                   Past Medical History:   Diagnosis Date     Female infertility 2019     MEDICAL HISTORY OF - 2006    infection of L5-S1.  ?abscess Treatment in [place]     Subclinical hyperthyroidism     resolved; had positive TSI and normal thyroid ultrasound     Tuberculosis       Past Surgical History:   Procedure Laterality Date     HC TREATMENT MISSED  SURG, 1ST TRIMESTER  2016     LAPAROSCOPIC SALPINGECTOMY Left 2019    small hydrosalpinx     OB History    Para Term   AB Living   4 0 0 0 4 0   SAB IAB Ectopic Multiple Live Births   4 0 0 0 0      # Outcome Date GA Lbr Ant/2nd Weight Sex Delivery Anes PTL Lv   4 SAB 10/01/20     SAB         Birth Comments: failed embryo transfer   3 SAB 03/01/20     SAB         Birth Comments: failed embryo transfer   2 SAB 10/2016     SAB      1 SAB 11/2015     SAB          Review of Systems   Constitutional: Negative for chills and fever.   HENT: Negative for congestion, ear pain, hearing loss and sore throat.    Eyes: Negative for pain and visual disturbance.   Respiratory: Negative for cough and shortness of breath.    Cardiovascular: Negative for chest pain, palpitations and peripheral edema.   Gastrointestinal: Negative for abdominal pain, constipation, diarrhea, heartburn, hematochezia and nausea.   Genitourinary: Negative for dysuria, frequency, genital sores, hematuria and urgency.   Musculoskeletal: Positive for arthralgias. Negative for joint swelling and myalgias.   Skin: Negative for rash.   Neurological: Positive for weakness. Negative for dizziness, headaches and paresthesias.   Psychiatric/Behavioral: Positive for mood changes. The patient is not nervous/anxious.      CONSTITUTIONAL: NEGATIVE for fever, chills, change in weight  INTEGUMENTARU/SKIN: NEGATIVE for worrisome rashes, moles or lesions  EYES: NEGATIVE for vision changes or irritation  ENT: NEGATIVE for ear, mouth and throat problems  RESP: NEGATIVE for significant cough or SOB  BREAST: NEGATIVE for masses, tenderness or discharge  CV: NEGATIVE for chest pain, palpitations or peripheral edema  GI: NEGATIVE for nausea, abdominal pain, heartburn, or change in bowel habits  : NEGATIVE for unusual urinary or vaginal symptoms. Periods are regular.  MUSCULOSKELETAL: NEGATIVE for significant arthralgias or myalgia  NEURO: NEGATIVE for weakness, dizziness or paresthesias  PSYCHIATRIC: NEGATIVE for changes in mood or affect     OBJECTIVE:   /70   Pulse 74   Temp 98  F  "(36.7  C) (Oral)   Resp 16   Ht 1.6 m (5' 3\")   Wt 80.3 kg (177 lb)   LMP  (LMP Unknown)   SpO2 99%   BMI 31.35 kg/m    Physical Exam  GENERAL: healthy, alert and no distress  NECK: no adenopathy, no asymmetry, masses, or scars and thyroid normal to palpation  RESP: lungs clear to auscultation - no rales, rhonchi or wheezes  CV: regular rate and rhythm, normal S1 S2, no S3 or S4, no murmur, click or rub, no peripheral edema and peripheral pulses strong  ABDOMEN: soft, nontender, no hepatosplenomegaly, no masses and bowel sounds normal  MS: no gross musculoskeletal defects noted, no edema    Diagnostic Test Results:  Labs reviewed in Epic    ASSESSMENT/PLAN:       ICD-10-CM    1. Routine general medical examination at a health care facility  Z00.00 Comprehensive metabolic panel (BMP + Alb, Alk Phos, ALT, AST, Total. Bili, TP)   2. Elevated blood sugar  R73.9 Hemoglobin A1c   3. Lipid screening  Z13.220    4. History of thyroid disease  Z86.39    5. Other iron deficiency anemia  D50.8 CBC with platelets and differential     Iron and iron binding capacity     Ferritin   6. Vitamin D deficiency  E55.9 Vitamin D Deficiency   7. BMI 31.0-31.9,adult  Z68.31 TSH with free T4 reflex     Comprehensive metabolic panel (BMP + Alb, Alk Phos, ALT, AST, Total. Bili, TP)   history of iron def anemia-not taking iron, advised iron supplements as planned, recheck in 3 weeks    Elevated blood glucose, check for diabetes    Vit D def-advised supplement    BMI 31-advised weight management , diet, exercise    History of infertility- working with OBGYN , fertility clinic. She wants me to check TSH as se has hard boarder line tests.      Low mood-she is doing well with her art and also exercising, advised consider therapy  Patient has been advised of split billing requirements and indicates understanding: Yes    COUNSELING:  Reviewed preventive health counseling, as reflected in patient instructions    Estimated body mass index is " "31.35 kg/m  as calculated from the following:    Height as of this encounter: 1.6 m (5' 3\").    Weight as of this encounter: 80.3 kg (177 lb).    Weight management plan: Discussed healthy diet and exercise guidelines    She reports that she has never smoked. She has never used smokeless tobacco.      Counseling Resources:  ATP IV Guidelines  Pooled Cohorts Equation Calculator  Breast Cancer Risk Calculator  BRCA-Related Cancer Risk Assessment: FHS-7 Tool  FRAX Risk Assessment  ICSI Preventive Guidelines  Dietary Guidelines for Americans, 2010  USDA's MyPlate  ASA Prophylaxis  Lung CA Screening    DO ALISON Henry [facility]  Answers for HPI/ROS submitted by the patient on 5/12/2022  PHQ9 TOTAL SCORE: 16      "

## 2022-05-11 NOTE — PATIENT INSTRUCTIONS
Mammogram Scheduling  [facility] [phone] or [phone]  [place] [facility] [phone]    Patient Education     Preventive Health Recommendations  Female Ages 40 to 49    Yearly exam:   See your health care provider every year in order to  Review health changes.   Discuss preventive care.    Review your medicines if your doctor prescribed any.    Get a Pap test every three years (unless you have an abnormal result and your provider advises testing more often).    If you get Pap tests with HPV test, you only need to test every 5 years, unless you have an abnormal result. You do not need a Pap test if your uterus was removed (hysterectomy) and you have not had cancer.    You should be tested each year for STDs (sexually transmitted diseases), if you're at risk.   Ask your doctor if you should have a mammogram.    Have a colonoscopy (test for colon cancer) if someone in your family has had colon cancer or polyps before age 50.     Have a cholesterol test every 5 years.     Have a diabetes test (fasting glucose) after age 45. If you are at risk for diabetes, you should have this test every 3 years.    Shots: Get a flu shot each year. Get a tetanus shot every 10 years.     Nutrition:   Eat at least 5 servings of fruits and vegetables each day.  Eat whole-grain bread, whole-wheat pasta and brown rice instead of white grains and rice.  Get adequate Calcium and Vitamin D.      Lifestyle  Exercise at least 150 minutes a week (an average of 30 minutes a day, 5 days a week). This will help you control your weight and prevent disease.  Limit alcohol to one drink per day.  No smoking.   Wear sunscreen to prevent skin cancer.  See your dentist every six months for an exam and cleaning.

## 2022-05-12 ENCOUNTER — OFFICE VISIT (OUTPATIENT)
Dept: FAMILY MEDICINE | Facility: CLINIC | Age: 42
End: 2022-05-12
Payer: COMMERCIAL

## 2022-05-12 VITALS
HEIGHT: 63 IN | WEIGHT: 177 LBS | TEMPERATURE: 98 F | HEART RATE: 74 BPM | BODY MASS INDEX: 31.36 KG/M2 | RESPIRATION RATE: 16 BRPM | DIASTOLIC BLOOD PRESSURE: 70 MMHG | OXYGEN SATURATION: 99 % | SYSTOLIC BLOOD PRESSURE: 120 MMHG

## 2022-05-12 DIAGNOSIS — E55.9 VITAMIN D DEFICIENCY: ICD-10-CM

## 2022-05-12 DIAGNOSIS — Z00.00 ROUTINE GENERAL MEDICAL EXAMINATION AT A HEALTH CARE FACILITY: Primary | ICD-10-CM

## 2022-05-12 DIAGNOSIS — Z86.39 HISTORY OF THYROID DISEASE: ICD-10-CM

## 2022-05-12 DIAGNOSIS — Z13.220 LIPID SCREENING: ICD-10-CM

## 2022-05-12 DIAGNOSIS — Z01.84 IMMUNITY STATUS TESTING: ICD-10-CM

## 2022-05-12 DIAGNOSIS — D50.8 OTHER IRON DEFICIENCY ANEMIA: ICD-10-CM

## 2022-05-12 DIAGNOSIS — Z12.31 VISIT FOR SCREENING MAMMOGRAM: ICD-10-CM

## 2022-05-12 DIAGNOSIS — R73.9 ELEVATED BLOOD SUGAR: ICD-10-CM

## 2022-05-12 LAB
BASOPHILS # BLD AUTO: 0 10E3/UL (ref 0–0.2)
BASOPHILS NFR BLD AUTO: 0 %
BURR CELLS BLD QL SMEAR: SLIGHT
ELLIPTOCYTES BLD QL SMEAR: ABNORMAL
EOSINOPHIL # BLD AUTO: 0 10E3/UL (ref 0–0.7)
EOSINOPHIL NFR BLD AUTO: 1 %
ERYTHROCYTE [DISTWIDTH] IN BLOOD BY AUTOMATED COUNT: 22.2 % (ref 10–15)
HBA1C MFR BLD: 5.5 % (ref 0–5.6)
HCT VFR BLD AUTO: 30 % (ref 35–47)
HGB BLD-MCNC: 8.2 G/DL (ref 11.7–15.7)
IMM GRANULOCYTES # BLD: 0 10E3/UL
IMM GRANULOCYTES NFR BLD: 0 %
LYMPHOCYTES # BLD AUTO: 1.4 10E3/UL (ref 0.8–5.3)
LYMPHOCYTES NFR BLD AUTO: 25 %
MCH RBC QN AUTO: 17.5 PG (ref 26.5–33)
MCHC RBC AUTO-ENTMCNC: 27.3 G/DL (ref 31.5–36.5)
MCV RBC AUTO: 64 FL (ref 78–100)
MONOCYTES # BLD AUTO: 0.4 10E3/UL (ref 0–1.3)
MONOCYTES NFR BLD AUTO: 7 %
NEUTROPHILS # BLD AUTO: 3.6 10E3/UL (ref 1.6–8.3)
NEUTROPHILS NFR BLD AUTO: 67 %
NRBC # BLD AUTO: 0 10E3/UL
NRBC BLD AUTO-RTO: 0 /100
PLAT MORPH BLD: ABNORMAL
PLATELET # BLD AUTO: 351 10E3/UL (ref 150–450)
RBC # BLD AUTO: 4.68 10E6/UL (ref 3.8–5.2)
RBC MORPH BLD: ABNORMAL
WBC # BLD AUTO: 5.4 10E3/UL (ref 4–11)

## 2022-05-12 PROCEDURE — 87340 HEPATITIS B SURFACE AG IA: CPT | Performed by: FAMILY MEDICINE

## 2022-05-12 PROCEDURE — 83550 IRON BINDING TEST: CPT | Performed by: FAMILY MEDICINE

## 2022-05-12 PROCEDURE — 82306 VITAMIN D 25 HYDROXY: CPT | Performed by: FAMILY MEDICINE

## 2022-05-12 PROCEDURE — 86735 MUMPS ANTIBODY: CPT | Performed by: FAMILY MEDICINE

## 2022-05-12 PROCEDURE — 99214 OFFICE O/P EST MOD 30 MIN: CPT | Mod: 25 | Performed by: FAMILY MEDICINE

## 2022-05-12 PROCEDURE — 83036 HEMOGLOBIN GLYCOSYLATED A1C: CPT | Performed by: FAMILY MEDICINE

## 2022-05-12 PROCEDURE — 86708 HEPATITIS A ANTIBODY: CPT | Performed by: FAMILY MEDICINE

## 2022-05-12 PROCEDURE — 80050 GENERAL HEALTH PANEL: CPT | Performed by: FAMILY MEDICINE

## 2022-05-12 PROCEDURE — 80061 LIPID PANEL: CPT | Performed by: FAMILY MEDICINE

## 2022-05-12 PROCEDURE — 82728 ASSAY OF FERRITIN: CPT | Performed by: FAMILY MEDICINE

## 2022-05-12 PROCEDURE — 86762 RUBELLA ANTIBODY: CPT | Performed by: FAMILY MEDICINE

## 2022-05-12 PROCEDURE — 99396 PREV VISIT EST AGE 40-64: CPT | Performed by: FAMILY MEDICINE

## 2022-05-12 PROCEDURE — 36415 COLL VENOUS BLD VENIPUNCTURE: CPT | Performed by: FAMILY MEDICINE

## 2022-05-12 PROCEDURE — 86706 HEP B SURFACE ANTIBODY: CPT | Performed by: FAMILY MEDICINE

## 2022-05-12 PROCEDURE — 86765 RUBEOLA ANTIBODY: CPT | Performed by: FAMILY MEDICINE

## 2022-05-12 RX ORDER — CHOLECALCIFEROL (VITAMIN D3) 50 MCG
1 TABLET ORAL DAILY
Qty: 90 TABLET | Refills: 0 | Status: SHIPPED | OUTPATIENT
Start: 2022-05-12 | End: 2024-08-13

## 2022-05-12 ASSESSMENT — ENCOUNTER SYMPTOMS
HEMATURIA: 0
ARTHRALGIAS: 1
CONSTIPATION: 0
HEARTBURN: 0
MYALGIAS: 0
NAUSEA: 0
PALPITATIONS: 0
FEVER: 0
DIZZINESS: 0
DYSURIA: 0
WEAKNESS: 1
EYE PAIN: 0
NERVOUS/ANXIOUS: 0
DIARRHEA: 0
ABDOMINAL PAIN: 0
SORE THROAT: 0
HEADACHES: 0
HEMATOCHEZIA: 0
JOINT SWELLING: 0
FREQUENCY: 0
CHILLS: 0
SHORTNESS OF BREATH: 0
PARESTHESIAS: 0
COUGH: 0

## 2022-05-12 ASSESSMENT — PATIENT HEALTH QUESTIONNAIRE - PHQ9
SUM OF ALL RESPONSES TO PHQ QUESTIONS 1-9: 16
SUM OF ALL RESPONSES TO PHQ QUESTIONS 1-9: 16

## 2022-05-12 ASSESSMENT — PAIN SCALES - GENERAL: PAINLEVEL: NO PAIN (0)

## 2022-05-12 NOTE — LETTER
May 16, 2022      Ines Gaitan  [address] [place] [address]        Dear Ines,     Your vitamin D is low, please start vitamin D and iron supplement as discussed.  You are immune to hepatitis A, but not Hepatatis B,  you need to come into any pharmacy and start hepatits B shots which is a three dose series.           Take Care,        Guy Paulson, DO    Results for orders placed or performed in visit on 05/12/22   TSH with free T4 reflex     Status: Normal   Result Value Ref Range    TSH 2.63 0.40 - 4.00 mU/L   Vitamin D Deficiency     Status: Abnormal   Result Value Ref Range    Vitamin D, Total (25-Hydroxy) 17 (L) 20 - 75 ug/L    Narrative    Season, race, dietary intake, and treatment affect the concentration of 25-hydroxy-Vitamin D. Values may decrease during winter months and increase during summer months. Values 20-29 ug/L may indicate Vitamin D insufficiency and values <20 ug/L may indicate Vitamin D deficiency.    Vitamin D determination is routinely performed by an immunoassay specific for 25 hydroxyvitamin D3.  If an individual is on vitamin D2(ergocalciferol) supplementation, please specify 25 OH vitamin D2 and D3 level determination by LCMSMS test [id number].     Iron and iron binding capacity     Status: Abnormal   Result Value Ref Range    Iron 13 (L) 35 - 180 ug/dL    Iron Binding Capacity 506 (H) 240 - 430 ug/dL    Iron Sat Index 3 (L) 15 - 46 %   Ferritin     Status: Abnormal   Result Value Ref Range    Ferritin 2 (L) 12 - 150 ng/mL   Comprehensive metabolic panel (BMP + Alb, Alk Phos, ALT, AST, Total. Bili, TP)     Status: Normal   Result Value Ref Range    Sodium 140 133 - 144 mmol/L    Potassium 3.8 3.4 - 5.3 mmol/L    Chloride 109 94 - 109 mmol/L    Carbon Dioxide (CO2) 22 20 - 32 mmol/L    Anion Gap 9 3 - 14 mmol/L    Urea Nitrogen 10 7 - 30 mg/dL    Creatinine 0.69 0.52 - 1.04 mg/dL    Calcium 8.8 8.5 - 10.1 mg/dL    Glucose 88 70 - 99 mg/dL    Alkaline Phosphatase 75 40  - 150 U/L    AST 22 0 - 45 U/L    ALT 27 0 - 50 U/L    Protein Total 7.9 6.8 - 8.8 g/dL    Albumin 4.1 3.4 - 5.0 g/dL    Bilirubin Total 0.5 0.2 - 1.3 mg/dL    GFR Estimate >90 >60 mL/min/1.73m2   Hemoglobin A1c     Status: Normal   Result Value Ref Range    Hemoglobin A1C 5.5 0.0 - 5.6 %   Lipid panel reflex to direct LDL Non-fasting     Status: Abnormal   Result Value Ref Range    Cholesterol 205 (H) <200 mg/dL    Triglycerides 152 (H) <150 mg/dL    Direct Measure HDL 43 (L) >=50 mg/dL    LDL Cholesterol Calculated 132 (H) <=100 mg/dL    Non HDL Cholesterol 162 (H) <130 mg/dL    Patient Fasting > 8hrs? Unknown     Narrative    Cholesterol  Desirable:  <200 mg/dL    Triglycerides  Normal:  Less than 150 mg/dL  Borderline High:  150-199 mg/dL  High:  200-499 mg/dL  Very High:  Greater than or equal to 500 mg/dL    Direct Measure HDL  Female:  Greater than or equal to 50 mg/dL   Male:  Greater than or equal to 40 mg/dL    LDL Cholesterol  Desirable:  <100mg/dL  Above Desirable:  100-129 mg/dL   Borderline High:  130-159 mg/dL   High:  160-189 mg/dL   Very High:  >= 190 mg/dL    Non HDL Cholesterol  Desirable:  130 mg/dL  Above Desirable:  130-159 mg/dL  Borderline High:  160-189 mg/dL  High:  190-219 mg/dL  Very High:  Greater than or equal to 220 mg/dL   CBC with platelets and differential     Status: Abnormal   Result Value Ref Range    WBC Count 5.4 4.0 - 11.0 10e3/uL    RBC Count 4.68 3.80 - 5.20 10e6/uL    Hemoglobin 8.2 (L) 11.7 - 15.7 g/dL    Hematocrit 30.0 (L) 35.0 - 47.0 %    MCV 64 (L) 78 - 100 fL    MCH 17.5 (L) 26.5 - 33.0 pg    MCHC 27.3 (L) 31.5 - 36.5 g/dL    RDW 22.2 (H) 10.0 - 15.0 %    Platelet Count 351 150 - 450 10e3/uL    % Neutrophils 67 %    % Lymphocytes 25 %    % Monocytes 7 %    % Eosinophils 1 %    % Basophils 0 %    % Immature Granulocytes 0 %    NRBCs per 100 WBC 0 <1 /100    Absolute Neutrophils 3.6 1.6 - 8.3 10e3/uL    Absolute Lymphocytes 1.4 0.8 - 5.3 10e3/uL    Absolute Monocytes  0.4 0.0 - 1.3 10e3/uL    Absolute Eosinophils 0.0 0.0 - 0.7 10e3/uL    Absolute Basophils 0.0 0.0 - 0.2 10e3/uL    Absolute Immature Granulocytes 0.0 <=0.4 10e3/uL    Absolute NRBCs 0.0 10e3/uL   Mumps Immune Status, IgG     Status: None   Result Value Ref Range    Mumps Vicky IgG Instrument Value >300.0 <9.0 AU/mL    Mumps Antibody IgG Positive    Hepatitis Antibody A IgG     Status: Abnormal   Result Value Ref Range    Hepatitis A Antibody IgG Reactive (A) Nonreactive    Narrative    This assay cannot be used for the diagnosis of acute HAV infection.   Hepatitis B surface antigen     Status: Normal   Result Value Ref Range    Hepatitis B Surface Antigen Nonreactive Nonreactive   Hepatitis B Surface Antibody     Status: None   Result Value Ref Range    Hepatitis B Surface Antibody 0.11 <8.00 m[IU]/mL   Rubeola Antibody IgG     Status: None   Result Value Ref Range    Rubeola (Measles) Vicky IgG Instrument Value >300.0 <13.5 AU/mL    Rubeola (Measles) Antibody IgG Positive    Rubella Antibody IgG     Status: None   Result Value Ref Range    Rubella Vicky IgG Instrument Value 20.40 <0.90 Index    Rubella Antibody IgG Positive    RBC and Platelet Morphology     Status: Abnormal   Result Value Ref Range    Platelet Assessment  Automated Count Confirmed. Platelet morphology is normal.     Automated Count Confirmed. Platelet morphology is normal.    Roddy Cells Slight (A) None Seen    Elliptocytes Moderate (A) None Seen    RBC Morphology Confirmed RBC Indices    CBC with platelets and differential     Status: Abnormal    Narrative    The following orders were created for panel order CBC with platelets and differential.  Procedure                               Abnormality         Status                     ---------                               -----------         ------                     CBC with platelets and d...[[id number]]  Abnormal            Final result               RBC and Platelet Morphology[[id number]]  Abnormal             Final result                 Please view results for these tests on the individual orders.

## 2022-05-13 LAB
ALBUMIN SERPL-MCNC: 4.1 G/DL (ref 3.4–5)
ALP SERPL-CCNC: 75 U/L (ref 40–150)
ALT SERPL W P-5'-P-CCNC: 27 U/L (ref 0–50)
ANION GAP SERPL CALCULATED.3IONS-SCNC: 9 MMOL/L (ref 3–14)
AST SERPL W P-5'-P-CCNC: 22 U/L (ref 0–45)
BILIRUB SERPL-MCNC: 0.5 MG/DL (ref 0.2–1.3)
BUN SERPL-MCNC: 10 MG/DL (ref 7–30)
CALCIUM SERPL-MCNC: 8.8 MG/DL (ref 8.5–10.1)
CHLORIDE BLD-SCNC: 109 MMOL/L (ref 94–109)
CHOLEST SERPL-MCNC: 205 MG/DL
CO2 SERPL-SCNC: 22 MMOL/L (ref 20–32)
CREAT SERPL-MCNC: 0.69 MG/DL (ref 0.52–1.04)
DEPRECATED CALCIDIOL+CALCIFEROL SERPL-MC: 17 UG/L (ref 20–75)
FASTING STATUS PATIENT QL REPORTED: ABNORMAL
FERRITIN SERPL-MCNC: 2 NG/ML (ref 12–150)
GFR SERPL CREATININE-BSD FRML MDRD: >90 ML/MIN/1.73M2
GLUCOSE BLD-MCNC: 88 MG/DL (ref 70–99)
HAV IGG SER QL IA: REACTIVE
HBV SURFACE AB SERPL IA-ACNC: 0.11 M[IU]/ML
HBV SURFACE AG SERPL QL IA: NONREACTIVE
HDLC SERPL-MCNC: 43 MG/DL
IRON SATN MFR SERPL: 3 % (ref 15–46)
IRON SERPL-MCNC: 13 UG/DL (ref 35–180)
LDLC SERPL CALC-MCNC: 132 MG/DL
MEV IGG SER IA-ACNC: >300 AU/ML
MEV IGG SER IA-ACNC: POSITIVE
MUMPS ANTIBODY IGG INSTRUMENT VALUE: >300 AU/ML
MUV IGG SER QL IA: POSITIVE
NONHDLC SERPL-MCNC: 162 MG/DL
POTASSIUM BLD-SCNC: 3.8 MMOL/L (ref 3.4–5.3)
PROT SERPL-MCNC: 7.9 G/DL (ref 6.8–8.8)
RUBV IGG SERPL QL IA: 20.4 INDEX
RUBV IGG SERPL QL IA: POSITIVE
SODIUM SERPL-SCNC: 140 MMOL/L (ref 133–144)
TIBC SERPL-MCNC: 506 UG/DL (ref 240–430)
TRIGL SERPL-MCNC: 152 MG/DL
TSH SERPL DL<=0.005 MIU/L-ACNC: 2.63 MU/L (ref 0.4–4)

## 2022-05-13 ASSESSMENT — PATIENT HEALTH QUESTIONNAIRE - PHQ9: SUM OF ALL RESPONSES TO PHQ QUESTIONS 1-9: 16

## 2022-05-16 NOTE — RESULT ENCOUNTER NOTE
Your vit D is low, please start vit D, and iron as discussed.  You are immune to hep A, but not B,  you need to come in any pharmacy and start hep B shots.    Take care  Guy Paulson D.O

## 2022-05-18 ENCOUNTER — THERAPY VISIT (OUTPATIENT)
Dept: PHYSICAL THERAPY | Facility: CLINIC | Age: 42
End: 2022-05-18
Payer: COMMERCIAL

## 2022-05-18 DIAGNOSIS — M25.551 HIP PAIN, RIGHT: ICD-10-CM

## 2022-05-18 PROCEDURE — 97110 THERAPEUTIC EXERCISES: CPT | Mod: GP | Performed by: PHYSICAL THERAPIST

## 2022-05-18 NOTE — PROGRESS NOTES
Subjective:  HPI  Physical Exam                    Objective:  System    Physical Exam    General     ROS    Assessment/Plan:    SUBJECTIVE  Subjective: Pt reports she has been feeling a little bit better with less pain noted. HAs been working on her HEP. Has not returned to [employer] yet and plans to remain out for another 3-4 weeks before she works back in,.   Current Pain level: 3/10   Changes in function:  Yes (See Goal flowsheet attached for changes in current functional level)     Adverse reaction to treatment or activity:  None    OBJECTIVE  Changes in objective findings:  None        ASSESSMENT  Ines continues to require intervention to meet STG and LTG's: PT  Patient's symptoms are resolving.  Response to therapy has shown an improvement in  pain level  Progress made towards STG/LTG?  Yes (See Goal flowsheet attached for updates on achievement of STG and LTG)    PLAN  Current treatment program is being advanced to more complex exercises.    PTA/ATC plan:  N/A    Please refer to the daily flowsheet for treatment today, total treatment time and time spent performing 1:1 timed codes.

## 2022-06-16 ENCOUNTER — ANCILLARY PROCEDURE (OUTPATIENT)
Dept: MAMMOGRAPHY | Facility: CLINIC | Age: 42
End: 2022-06-16
Attending: FAMILY MEDICINE
Payer: COMMERCIAL

## 2022-06-16 DIAGNOSIS — Z12.31 VISIT FOR SCREENING MAMMOGRAM: ICD-10-CM

## 2022-06-16 PROCEDURE — 77067 SCR MAMMO BI INCL CAD: CPT | Mod: TC | Performed by: RADIOLOGY

## 2022-06-16 PROCEDURE — 77063 BREAST TOMOSYNTHESIS BI: CPT | Mod: TC | Performed by: RADIOLOGY

## 2022-06-24 ENCOUNTER — THERAPY VISIT (OUTPATIENT)
Dept: PHYSICAL THERAPY | Facility: CLINIC | Age: 42
End: 2022-06-24
Payer: COMMERCIAL

## 2022-06-24 DIAGNOSIS — M25.551 HIP PAIN, RIGHT: Primary | ICD-10-CM

## 2022-06-24 PROCEDURE — 97110 THERAPEUTIC EXERCISES: CPT | Mod: GP | Performed by: PHYSICAL THERAPIST

## 2022-07-08 ENCOUNTER — THERAPY VISIT (OUTPATIENT)
Dept: PHYSICAL THERAPY | Facility: CLINIC | Age: 42
End: 2022-07-08
Payer: COMMERCIAL

## 2022-07-08 DIAGNOSIS — M25.551 HIP PAIN, RIGHT: Primary | ICD-10-CM

## 2022-07-08 PROCEDURE — 97110 THERAPEUTIC EXERCISES: CPT | Mod: GP | Performed by: PHYSICAL THERAPIST

## 2022-09-10 ENCOUNTER — HEALTH MAINTENANCE LETTER (OUTPATIENT)
Age: 42
End: 2022-09-10

## 2022-09-12 ENCOUNTER — THERAPY VISIT (OUTPATIENT)
Dept: PHYSICAL THERAPY | Facility: CLINIC | Age: 42
End: 2022-09-12
Payer: COMMERCIAL

## 2022-09-12 DIAGNOSIS — M25.551 HIP PAIN, RIGHT: Primary | ICD-10-CM

## 2022-09-12 PROCEDURE — 97110 THERAPEUTIC EXERCISES: CPT | Mod: GP | Performed by: PHYSICAL THERAPIST

## 2022-09-19 ENCOUNTER — THERAPY VISIT (OUTPATIENT)
Dept: PHYSICAL THERAPY | Facility: CLINIC | Age: 42
End: 2022-09-19
Payer: COMMERCIAL

## 2022-09-19 DIAGNOSIS — M25.551 HIP PAIN, RIGHT: Primary | ICD-10-CM

## 2022-09-19 PROCEDURE — 97110 THERAPEUTIC EXERCISES: CPT | Mod: GP | Performed by: PHYSICAL THERAPIST

## 2022-09-29 ENCOUNTER — THERAPY VISIT (OUTPATIENT)
Dept: PHYSICAL THERAPY | Facility: CLINIC | Age: 42
End: 2022-09-29
Payer: COMMERCIAL

## 2022-09-29 DIAGNOSIS — M25.551 HIP PAIN, RIGHT: Primary | ICD-10-CM

## 2022-09-29 PROCEDURE — 97110 THERAPEUTIC EXERCISES: CPT | Mod: GP | Performed by: PHYSICAL THERAPIST

## 2023-05-03 PROBLEM — M25.551 HIP PAIN, RIGHT: Status: RESOLVED | Noted: 2022-05-18 | Resolved: 2023-05-03

## 2023-06-05 ENCOUNTER — TELEPHONE (OUTPATIENT)
Dept: FAMILY MEDICINE | Facility: CLINIC | Age: 43
End: 2023-06-05
Payer: COMMERCIAL

## 2023-06-05 NOTE — TELEPHONE ENCOUNTER
Called and left voicemail.     Offered open appointment tomorrow and Wednesday.     Flory Rutherford MA

## 2023-06-05 NOTE — TELEPHONE ENCOUNTER
Guy Paulson, DO  [facility] [employer]  Please see if you can get her in sooner   Her  asked if we could   I have some 60min slot tues

## 2023-06-15 ENCOUNTER — OFFICE VISIT (OUTPATIENT)
Dept: FAMILY MEDICINE | Facility: CLINIC | Age: 43
End: 2023-06-15
Payer: COMMERCIAL

## 2023-06-15 VITALS
HEIGHT: 63 IN | SYSTOLIC BLOOD PRESSURE: 124 MMHG | DIASTOLIC BLOOD PRESSURE: 72 MMHG | BODY MASS INDEX: 30.12 KG/M2 | HEART RATE: 90 BPM | RESPIRATION RATE: 16 BRPM | WEIGHT: 170 LBS | TEMPERATURE: 98 F | OXYGEN SATURATION: 100 %

## 2023-06-15 DIAGNOSIS — D50.9 IRON DEFICIENCY ANEMIA, UNSPECIFIED IRON DEFICIENCY ANEMIA TYPE: ICD-10-CM

## 2023-06-15 DIAGNOSIS — R05.9 COUGH, UNSPECIFIED TYPE: ICD-10-CM

## 2023-06-15 DIAGNOSIS — G43.909 MIGRAINE WITHOUT STATUS MIGRAINOSUS, NOT INTRACTABLE, UNSPECIFIED MIGRAINE TYPE: ICD-10-CM

## 2023-06-15 DIAGNOSIS — E03.8 SUBCLINICAL HYPOTHYROIDISM: Primary | ICD-10-CM

## 2023-06-15 DIAGNOSIS — F43.23 ADJUSTMENT DISORDER WITH MIXED ANXIETY AND DEPRESSED MOOD: ICD-10-CM

## 2023-06-15 DIAGNOSIS — N97.9 FEMALE FERTILITY PROBLEMS: ICD-10-CM

## 2023-06-15 DIAGNOSIS — E55.9 VITAMIN D DEFICIENCY: ICD-10-CM

## 2023-06-15 DIAGNOSIS — R06.7 SNEEZING: ICD-10-CM

## 2023-06-15 DIAGNOSIS — K59.00 CONSTIPATION, UNSPECIFIED CONSTIPATION TYPE: ICD-10-CM

## 2023-06-15 PROCEDURE — 99215 OFFICE O/P EST HI 40 MIN: CPT | Performed by: FAMILY MEDICINE

## 2023-06-15 RX ORDER — LORATADINE 10 MG/1
10 TABLET ORAL DAILY
Qty: 30 TABLET | Refills: 0 | Status: SHIPPED | OUTPATIENT
Start: 2023-06-15

## 2023-06-15 RX ORDER — ESCITALOPRAM OXALATE 10 MG/1
10 TABLET ORAL DAILY
Qty: 90 TABLET | Refills: 0 | Status: SHIPPED | OUTPATIENT
Start: 2023-06-15 | End: 2024-06-17

## 2023-06-15 RX ORDER — POLYETHYLENE GLYCOL 3350 17 G/17G
1 POWDER, FOR SOLUTION ORAL DAILY
Qty: 850 G | Refills: 0 | Status: SHIPPED | OUTPATIENT
Start: 2023-06-15 | End: 2024-09-18

## 2023-06-15 ASSESSMENT — PAIN SCALES - GENERAL: PAINLEVEL: NO PAIN (0)

## 2023-06-15 NOTE — PATIENT INSTRUCTIONS
Keep dairy of mood and headaches  Take lexapro, follow up 4 weeks  Needs to see mental health    Follow up for lab as planned    Trial of med for cough as discussed  Follow up in 4-6 weeks    Guy Paulson D.O.          Patient Education

## 2023-06-15 NOTE — PROGRESS NOTES
ICD-10-CM    1. Subclinical hypothyroidism  E03.8       2. Constipation, unspecified constipation type  K59.00 polyethylene glycol (MIRALAX) 17 GM/Dose powder      3. Adjustment disorder with mixed anxiety and depressed mood  F43.23 escitalopram (LEXAPRO) 10 MG tablet     Adult Mental Health [facility] Referral      4. Cough, unspecified type  R05.9 loratadine (CLARITIN) 10 MG tablet      5. Sneezing  R06.7 loratadine (CLARITIN) 10 MG tablet      6. Female fertility problems  N97.9       7. Migraine without status migrainosus, not intractable, unspecified migraine type  G43.909       8. Vitamin D deficiency  E55.9       9. Iron deficiency anemia, unspecified iron deficiency anemia type  D50.9         Patient with history of vaginally problems-has had many attempts at IVF.  Discussed.  She has had management by providers [place] and also recently in [place].  This process has been hard for her and has been Struggling with depression anxiety.  Patient started advised to see therapist.  Referral to therapy placed.  Does report this point is affecting daily living.  She is decrease energy, lack of interest, etc. In the past declined meds for depression.  We reviewed risks and benefits of the medication.  Patient is advised to start Lexapro 10 mg daily and monitor symptoms.  Follow-up therapy plan.  Follow-up with me in 4-6Weeks.    Constipation-patient does have a history of constipation.  Patient advised to get fiber and water.  Trial of miralax  Follow-up reports weeks    History of abnormal thyroid patient is advised to check thyroid labs today.  She is due for her labs to the next visit and will contact them.-Recent labs have been normal so far.  She report she has had normal labs in [place].    Recent symptoms of sneezing /cough-no fever , no illnesses-sounds like allergies.  Advised trial of Claritin and follow-up if not resolving    History of migraines-recently diagnosed in [place] slowly improving has  Imitrex at home.  We did discuss concerns of triggers or distress.  She does report her in-laws are here as well.  She  she thinks that increasing stress does impact her headaches.  Denies change in the nature of the migraines.  She does have Imitrex at home to use as needed.  Keep a diary of symptoms and follow-up in 6 weeks.  Hoping that Lexapro will help with ongoing headache symptoms.    History of  Vit d and iron deficiency-she is on her supplementation patient is advised to restart recheck at her next visit      Spent  47min greater than 50% of counseling and coordination of care for the conditions documented above.      Jann Chaocn is a 42 year old, presenting for the following health issues:  Hypertension and Headache        6/15/2023    11:32 AM   Additional Questions   Roomed by Flory     Reports that blood pressures are a concern.   She was in [place] for IVF procedure. Was given highest dose for stimulation. She has been dealing with headaches since IVF. From back of head up into left ear.   Over last two weeks 120-130/90-s  Not taking suplements for the last month, did complete long course,     History of Present Illness       Reason for visit:  Head ache and high blood pressure    She eats 2-3 servings of fruits and vegetables daily.She consumes 1 sweetened beverage(s) daily.She exercises with enough effort to increase her heart rate 10 to 19 minutes per day.  She exercises with enough effort to increase her heart rate 3 or less days per week. She is missing 2 dose(s) of medications per week.     She was on high dose progesterone for IVF in [place], started to have headaches   Left sided  Sharp pain, to the front, 7-8 , doc diagnosed with migraines and was given migraines, drowsy but headaches resolved    She is not taking her meds  Improved now  Getting headaches 3- a weeks  Last 15 min now, sleeping helps  Light sensitivity, some nausea with it and dizziness    bp's 130/97 highest  Stressful  "time for her life- IVF -did not work, very stressed  Less excitement, low mood  Has not talked to     Cough resolving but wants to resolved            Review of Systems         Objective    /72   Pulse 90   Temp 98  F (36.7  C) (Oral)   Resp 16   Ht 1.6 m (5' 3\")   Wt 77.1 kg (170 lb)   SpO2 100%   BMI 30.11 kg/m    Body mass index is 30.11 kg/m .  Physical Exam                       "

## 2023-07-11 ENCOUNTER — ANCILLARY PROCEDURE (OUTPATIENT)
Dept: GENERAL RADIOLOGY | Facility: CLINIC | Age: 43
End: 2023-07-11
Attending: NURSE PRACTITIONER
Payer: COMMERCIAL

## 2023-07-11 ENCOUNTER — OFFICE VISIT (OUTPATIENT)
Dept: URGENT CARE | Facility: URGENT CARE | Age: 43
End: 2023-07-11
Payer: COMMERCIAL

## 2023-07-11 VITALS
RESPIRATION RATE: 18 BRPM | DIASTOLIC BLOOD PRESSURE: 80 MMHG | TEMPERATURE: 97.1 F | BODY MASS INDEX: 29.94 KG/M2 | SYSTOLIC BLOOD PRESSURE: 112 MMHG | HEART RATE: 68 BPM | OXYGEN SATURATION: 99 % | WEIGHT: 169 LBS

## 2023-07-11 DIAGNOSIS — M25.562 ACUTE PAIN OF LEFT KNEE: ICD-10-CM

## 2023-07-11 DIAGNOSIS — M25.562 ACUTE PAIN OF LEFT KNEE: Primary | ICD-10-CM

## 2023-07-11 PROCEDURE — 73562 X-RAY EXAM OF KNEE 3: CPT | Mod: TC | Performed by: RADIOLOGY

## 2023-07-11 PROCEDURE — 99213 OFFICE O/P EST LOW 20 MIN: CPT | Performed by: NURSE PRACTITIONER

## 2023-07-11 NOTE — PROGRESS NOTES
"Assessment & Plan     Acute pain of left knee    - XR Knee Left 3 Views  - Orthopedic  Referral     Reviewed xray images and results with patient during visit showing, \"Tiny lateral and patellofemoral compartment osteophytes. No fracture, effusion or calcified intra-articular body.\" Suspect overuse injury from increased stairs and recommend avoid stairs, rest, ice, compress, elevate, tylenol and motrin as needed for pain. Referral placed for orthopedics evaluation due to duration of symptoms.     Follow-up with ortho if symptoms persist for 7 days, and sooner if symptoms worsen or new symptoms develop.     Discussed red flag symptoms which warrant immediate visit in emergency room    All questions were answered and patient verbalized understanding. AVS reviewed with patient.     Hallie Galvez, DNP, APRN, CNP 7/11/2023 2:36 PM  [facility] [place]          Jann Chacon is a 42 year old female who presents to clinic today with her mom for the following health issues:  Chief Complaint   Patient presents with     Urgent Care     Musculoskeletal Problem     Pt having left knee pain     MS Injury/Pain    Onset of symptoms was a couple weeks ago.  Location: left knee  Context: No known injury, though has been using stairs more often since moving to basement with parents move to home  Course of symptoms is worsening.    Severity moderate  Current and Associated symptoms: Pain, Swelling and Decreased range of motion  Denies  Bruising, Warmth and Redness  Aggravating Factors: walking, especially down stairs  Therapies to improve symptoms include: none  This is not the first time this type of problem has occurred for this patient. She injured her knee 12 years ago which resolved with cartilage injury        Problem list, Medication list, Allergies, and Medical history reviewed in EPIC.    ROS:  Review of systems negative except for noted above        Objective    /80   Pulse 68   " Temp 97.1  F (36.2  C) (Tympanic)   Resp 18   Wt 76.7 kg (169 lb)   LMP 06/30/2023 (Exact Date)   SpO2 99%   BMI 29.94 kg/m    Physical Exam  Constitutional:       General: She is not in acute distress.     Appearance: She is not toxic-appearing or diaphoretic.   Musculoskeletal:      Left upper leg: Normal.      Left knee: No swelling. Decreased range of motion. Tenderness present over the lateral joint line. No LCL laxity, MCL laxity, ACL laxity or PCL laxity.     Instability Tests: Anterior drawer test negative. Posterior drawer test negative.      Left lower leg: Normal.      Comments: Tenderness with palpation left lateral knee, decreased ROM flexion past 90 degrees, full Extension   Skin:     General: Skin is warm and dry.      Capillary Refill: Capillary refill takes less than 2 seconds.      Findings: No bruising or erythema.   Neurological:      General: No focal deficit present.      Mental Status: She is alert.      Sensory: No sensory deficit.      Motor: No weakness.      Gait: Gait normal.            X-ray left knee was performed and reviewed independently by myself showing no obvious fracture or dislocation  Radiologist impression:   Results for orders placed or performed in visit on 07/11/23   XR Knee Left 3 Views     Status: None    Narrative    KNEE LEFT THREE VIEWS  July 11, 2023 2:00 PM     HISTORY: Lateral knee pain after stairs. Acute pain of left knee.    COMPARISON: None.      Impression    IMPRESSION: Tiny lateral and patellofemoral compartment osteophytes.  No fracture, effusion or calcified intra-articular body.    ALMA DOWNEY MD         SYSTEM ID:  [id number]

## 2023-07-13 ENCOUNTER — OFFICE VISIT (OUTPATIENT)
Dept: FAMILY MEDICINE | Facility: CLINIC | Age: 43
End: 2023-07-13
Payer: COMMERCIAL

## 2023-07-13 ENCOUNTER — TELEPHONE (OUTPATIENT)
Dept: OPTOMETRY | Facility: CLINIC | Age: 43
End: 2023-07-13

## 2023-07-13 VITALS
BODY MASS INDEX: 30.12 KG/M2 | OXYGEN SATURATION: 100 % | WEIGHT: 170 LBS | RESPIRATION RATE: 16 BRPM | HEART RATE: 75 BPM | DIASTOLIC BLOOD PRESSURE: 74 MMHG | TEMPERATURE: 98 F | HEIGHT: 63 IN | SYSTOLIC BLOOD PRESSURE: 110 MMHG

## 2023-07-13 DIAGNOSIS — D50.8 OTHER IRON DEFICIENCY ANEMIA: ICD-10-CM

## 2023-07-13 DIAGNOSIS — E03.8 SUBCLINICAL HYPOTHYROIDISM: Primary | ICD-10-CM

## 2023-07-13 DIAGNOSIS — M25.562 LEFT KNEE PAIN, UNSPECIFIED CHRONICITY: ICD-10-CM

## 2023-07-13 DIAGNOSIS — F43.23 ADJUSTMENT DISORDER WITH MIXED ANXIETY AND DEPRESSED MOOD: ICD-10-CM

## 2023-07-13 DIAGNOSIS — E55.9 VITAMIN D DEFICIENCY: ICD-10-CM

## 2023-07-13 LAB
BASOPHILS # BLD AUTO: 0 10E3/UL (ref 0–0.2)
BASOPHILS NFR BLD AUTO: 1 %
EOSINOPHIL # BLD AUTO: 0.1 10E3/UL (ref 0–0.7)
EOSINOPHIL NFR BLD AUTO: 1 %
ERYTHROCYTE [DISTWIDTH] IN BLOOD BY AUTOMATED COUNT: 15.6 % (ref 10–15)
FERRITIN SERPL-MCNC: 12 NG/ML (ref 6–175)
HCT VFR BLD AUTO: 38 % (ref 35–47)
HGB BLD-MCNC: 11.5 G/DL (ref 11.7–15.7)
IMM GRANULOCYTES # BLD: 0 10E3/UL
IMM GRANULOCYTES NFR BLD: 0 %
IRON BINDING CAPACITY (ROCHE): 397 UG/DL (ref 240–430)
IRON SATN MFR SERPL: 7 % (ref 15–46)
IRON SERPL-MCNC: 28 UG/DL (ref 37–145)
LYMPHOCYTES # BLD AUTO: 1.9 10E3/UL (ref 0.8–5.3)
LYMPHOCYTES NFR BLD AUTO: 37 %
MCH RBC QN AUTO: 23.9 PG (ref 26.5–33)
MCHC RBC AUTO-ENTMCNC: 30.3 G/DL (ref 31.5–36.5)
MCV RBC AUTO: 79 FL (ref 78–100)
MONOCYTES # BLD AUTO: 0.5 10E3/UL (ref 0–1.3)
MONOCYTES NFR BLD AUTO: 9 %
NEUTROPHILS # BLD AUTO: 2.8 10E3/UL (ref 1.6–8.3)
NEUTROPHILS NFR BLD AUTO: 53 %
PLATELET # BLD AUTO: 208 10E3/UL (ref 150–450)
RBC # BLD AUTO: 4.82 10E6/UL (ref 3.8–5.2)
WBC # BLD AUTO: 5.3 10E3/UL (ref 4–11)

## 2023-07-13 PROCEDURE — 36415 COLL VENOUS BLD VENIPUNCTURE: CPT | Performed by: FAMILY MEDICINE

## 2023-07-13 PROCEDURE — 83550 IRON BINDING TEST: CPT | Performed by: FAMILY MEDICINE

## 2023-07-13 PROCEDURE — 82306 VITAMIN D 25 HYDROXY: CPT | Performed by: FAMILY MEDICINE

## 2023-07-13 PROCEDURE — 90651 9VHPV VACCINE 2/3 DOSE IM: CPT | Performed by: FAMILY MEDICINE

## 2023-07-13 PROCEDURE — 90746 HEPB VACCINE 3 DOSE ADULT IM: CPT | Performed by: FAMILY MEDICINE

## 2023-07-13 PROCEDURE — 83540 ASSAY OF IRON: CPT | Performed by: FAMILY MEDICINE

## 2023-07-13 PROCEDURE — 99215 OFFICE O/P EST HI 40 MIN: CPT | Mod: 25 | Performed by: FAMILY MEDICINE

## 2023-07-13 PROCEDURE — 82728 ASSAY OF FERRITIN: CPT | Performed by: FAMILY MEDICINE

## 2023-07-13 PROCEDURE — 85025 COMPLETE CBC W/AUTO DIFF WBC: CPT | Performed by: FAMILY MEDICINE

## 2023-07-13 PROCEDURE — 90472 IMMUNIZATION ADMIN EACH ADD: CPT | Performed by: FAMILY MEDICINE

## 2023-07-13 PROCEDURE — 90471 IMMUNIZATION ADMIN: CPT | Performed by: FAMILY MEDICINE

## 2023-07-13 RX ORDER — ERGOCALCIFEROL 1.25 MG/1
50000 CAPSULE, LIQUID FILLED ORAL WEEKLY
Qty: 4 CAPSULE | Refills: 3 | Status: SHIPPED | OUTPATIENT
Start: 2023-07-13 | End: 2024-07-15

## 2023-07-13 ASSESSMENT — ANXIETY QUESTIONNAIRES
7. FEELING AFRAID AS IF SOMETHING AWFUL MIGHT HAPPEN: MORE THAN HALF THE DAYS
2. NOT BEING ABLE TO STOP OR CONTROL WORRYING: SEVERAL DAYS
4. TROUBLE RELAXING: SEVERAL DAYS
GAD7 TOTAL SCORE: 8
3. WORRYING TOO MUCH ABOUT DIFFERENT THINGS: SEVERAL DAYS
IF YOU CHECKED OFF ANY PROBLEMS ON THIS QUESTIONNAIRE, HOW DIFFICULT HAVE THESE PROBLEMS MADE IT FOR YOU TO DO YOUR WORK, TAKE CARE OF THINGS AT HOME, OR GET ALONG WITH OTHER PEOPLE: SOMEWHAT DIFFICULT
5. BEING SO RESTLESS THAT IT IS HARD TO SIT STILL: NOT AT ALL
1. FEELING NERVOUS, ANXIOUS, OR ON EDGE: SEVERAL DAYS
6. BECOMING EASILY ANNOYED OR IRRITABLE: MORE THAN HALF THE DAYS
GAD7 TOTAL SCORE: 8

## 2023-07-13 ASSESSMENT — PATIENT HEALTH QUESTIONNAIRE - PHQ9
SUM OF ALL RESPONSES TO PHQ QUESTIONS 1-9: 8
10. IF YOU CHECKED OFF ANY PROBLEMS, HOW DIFFICULT HAVE THESE PROBLEMS MADE IT FOR YOU TO DO YOUR WORK, TAKE CARE OF THINGS AT HOME, OR GET ALONG WITH OTHER PEOPLE: SOMEWHAT DIFFICULT
SUM OF ALL RESPONSES TO PHQ QUESTIONS 1-9: 8

## 2023-07-13 NOTE — PROGRESS NOTES
ICD-10-CM    1. Subclinical hypothyroidism  E03.8       2. Left knee pain, unspecified chronicity  M25.562 Orthopedic  Referral     Physical Therapy Referral      3. Adjustment disorder with mixed anxiety and depressed mood  F43.23       4. Vitamin D deficiency  E55.9 Vitamin D Deficiency     vitamin D2 (ERGOCALCIFEROL) 55107 units (1250 mcg) capsule     Vitamin D Deficiency      5. Other iron deficiency anemia  D50.8 CBC with platelets and differential     Ferritin     Iron and iron binding capacity     CBC with platelets and differential     Ferritin     Iron and iron binding capacity        Patient with history of adjustment disorder with anxiety depression-recently started Lexapro and feels it is working well.  Discussed at length with going to therapy.  Patient this point does not think that she would benefit from therapy.  We did discuss increasing physical activity, having a schedule for sitting/self care.  Advised follow-up in 8 to 10 weeks      Recent history of left knee pain-x-ray shows arthritic changes.  Patient does have upcoming appointment with sports also.  Advised ice heat.  PT referral also placed.    Iron deficiency anemia-patient has not been consistent with her iron supplementation.  Check labs today-hemoglobin today is 11.5.  Advised continuing iron supplementation and awaiting ferritin and iron study results.    Vitamin D deficiency-patient supposed to be vitamin D supplementation.  She prefers to take a weekly vitamin D.  She has not started daily vitamin D and like to know what her levels are today.  Awaiting vitamin D results.  Refilled vitamin D3 dosage for now.    Reviewed previous results for immunity test.  Patient does have recommended immunity hepatitis A.  But hepatitis B antibodies negative.  She is to have her first testing today.  Follow-up on the subsequent admission.  We also did discuss HPV vaccination.  She like to start series now.  Given her first dose today  and finish series at pharmacy.        Spent  55 min greater than 50% of counseling and coordination of care for the conditions documented above.      Jann Chacon is a 42 year old, presenting for the following health issues:  RECHECK        7/13/2023    12:22 PM   Additional Questions   Roomed by Flory     History of Present Illness       Reason for visit:  Follow up    She eats 2-3 servings of fruits and vegetables daily.She consumes 1 sweetened beverage(s) daily.She exercises with enough effort to increase her heart rate 9 or less minutes per day.  She exercises with enough effort to increase her heart rate 3 or less days per week. She is missing 2 dose(s) of medications per week.    Today's PHQ-9         PHQ-9 Total Score: 8    PHQ-9 Q9 Thoughts of better off dead/self-harm past 2 weeks :   Not at all    How difficult have these problems made it for you to do your work, take care of things at home, or get along with other people: Somewhat difficult  Today's SHAZIA-7 Score: 8       Depression and Anxiety Follow-Up    How are you doing with your depression since your last visit? Improved a little    How are you doing with your anxiety since your last visit?  Improved a little    Are you having other symptoms that might be associated with depression or anxiety? No    Have you had a significant life event? No     Do you have any concerns with your use of alcohol or other drugs? No    Social History     Tobacco Use     Smoking status: Never     Smokeless tobacco: Never   Vaping Use     Vaping Use: Never used   Substance Use Topics     Alcohol use: No     Drug use: No         5/12/2022    12:07 PM 7/13/2023    12:18 PM   PHQ   PHQ-9 Total Score 16 8   Q9: Thoughts of better off dead/self-harm past 2 weeks Not at all Not at all         7/13/2023    12:18 PM   SHAZIA-7 SCORE   Total Score 8 (mild anxiety)   Total Score 8         7/13/2023    12:18 PM   Last PHQ-9   1.  Little interest or pleasure in doing things 1    2.  Feeling down, depressed, or hopeless 1   3.  Trouble falling or staying asleep, or sleeping too much 1   4.  Feeling tired or having little energy 1   5.  Poor appetite or overeating 0   6.  Feeling bad about yourself 1   7.  Trouble concentrating 1   8.  Moving slowly or restless 2   Q9: Thoughts of better off dead/self-harm past 2 weeks 0   PHQ-9 Total Score 8         7/13/2023    12:18 PM   SHAZIA-7    1. Feeling nervous, anxious, or on edge 1   2. Not being able to stop or control worrying 1   3. Worrying too much about different things 1   4. Trouble relaxing 1   5. Being so restless that it is hard to sit still 0   6. Becoming easily annoyed or irritable 2   7. Feeling afraid, as if something awful might happen 2   SHAZIA-7 Total Score 8   If you checked any problems, how difficult have they made it for you to do your work, take care of things at home, or get along with other people? Somewhat difficult       Suicide Assessment Five-step Evaluation and Treatment (SAFE-T)        Patient with history of vaginally problems-has had many attempts at IVF.  Discussed.  She has had management by providers [place] and also recently in [place].  This process has been hard for her and has been Struggling with depression anxiety.  Patient started advised to see therapist.  Referral to therapy placed.  Does report this point is affecting daily living.  She is decrease energy, lack of interest, etc. In the past declined meds for depression.  We reviewed risks and benefits of the medication.  Patient is advised to start Lexapro 10 mg daily and monitor symptoms.  Follow-up therapy plan.  Follow-up with me in 4-6Weeks.     Constipation-patient does have a history of constipation.  Patient advised to get fiber and water.  Trial of miralax  Follow-up reports weeks     History of abnormal thyroid patient is advised to check thyroid labs today.  She is due for her labs to the next visit and will contact them.-Recent labs have  "been normal so far.  She report she has had normal labs in [place].     Recent symptoms of sneezing /cough-no fever , no illnesses-sounds like allergies.  Advised trial of Claritin and follow-up if not resolving     History of migraines-recently diagnosed in [place] slowly improving has Imitrex at home.  We did discuss concerns of triggers or distress.  She does report her in-laws are here as well.  She  she thinks that increasing stress does impact her headaches.  Denies change in the nature of the migraines.  She does have Imitrex at home to use as needed.  Keep a diary of symptoms and follow-up in 6 weeks.  Hoping that Lexapro will help with ongoing headache symptoms.     History of  Vit d and iron deficiency-she is on her supplementation patient is advised to restart recheck at her next visit    Review of Systems   Constitutional, HEENT, cardiovascular, pulmonary, GI, , musculoskeletal, neuro, skin, endocrine and psych systems are negative, except as otherwise noted.      Objective    /74   Pulse 75   Temp 98  F (36.7  C) (Oral)   Resp 16   Ht 1.6 m (5' 3\")   Wt 77.1 kg (170 lb)   LMP 06/30/2023 (Exact Date)   SpO2 100%   BMI 30.11 kg/m    Body mass index is 30.11 kg/m .  Physical Exam   GENERAL: healthy, alert and no distress  NECK: no adenopathy, no asymmetry, masses, or scars and thyroid normal to palpation  RESP: lungs clear to auscultation - no rales, rhonchi or wheezes  CV: regular rate and rhythm, normal S1 S2, no S3 or S4, no murmur, click or rub, no peripheral edema and peripheral pulses strong  ABDOMEN: soft, nontender, no hepatosplenomegaly, no masses and bowel sounds normal  MS: no gross musculoskeletal defects noted, no edema                    "

## 2023-07-13 NOTE — PATIENT INSTRUCTIONS
Acupuncture in the community  Continue current meds  Massages, yoga    Follow up for physical   Guy Paulson D.O.

## 2023-07-13 NOTE — TELEPHONE ENCOUNTER
Spoke with patient to inform her that her medical insurance does not cover adult eye exams (21+). Pt asked about eye exam pricing and I provided her phone # to get pricing. Pt will decide if she would like to keep or cancel appt after hearing pricing.     Elana Santiago on 7/13/2023 at 8:34 AM

## 2023-07-14 LAB — DEPRECATED CALCIDIOL+CALCIFEROL SERPL-MC: 25 UG/L (ref 20–75)

## 2023-07-17 NOTE — RESULT ENCOUNTER NOTE
Vit D and iron is low, please supplement as planned.   Please contact me if you have any questions.  Take care,  Guy Paulson D.O.

## 2023-07-23 ENCOUNTER — HEALTH MAINTENANCE LETTER (OUTPATIENT)
Age: 43
End: 2023-07-23

## 2023-07-25 ENCOUNTER — OFFICE VISIT (OUTPATIENT)
Dept: ORTHOPEDICS | Facility: CLINIC | Age: 43
End: 2023-07-25
Attending: NURSE PRACTITIONER
Payer: COMMERCIAL

## 2023-07-25 VITALS
WEIGHT: 170 LBS | HEIGHT: 63 IN | DIASTOLIC BLOOD PRESSURE: 80 MMHG | SYSTOLIC BLOOD PRESSURE: 114 MMHG | BODY MASS INDEX: 30.12 KG/M2

## 2023-07-25 DIAGNOSIS — M25.562 LEFT KNEE PAIN, UNSPECIFIED CHRONICITY: ICD-10-CM

## 2023-07-25 PROCEDURE — 99204 OFFICE O/P NEW MOD 45 MIN: CPT | Performed by: PEDIATRICS

## 2023-07-25 NOTE — PROGRESS NOTES
ASSESSMENT & PLAN    Ines was seen today for pain.    Diagnoses and all orders for this visit:    Left knee pain, unspecified chronicity  -     Orthopedic  Referral      Acute on chronic.  Potential for some chondrosis by history.  Desires PT next. Would also offer MRI if not improving.  Questions answered. Discussed signs and symptoms that may indicate more serious issues; the patient was instructed to seek appropriate care if noted. Ines indicates understanding of these issues and agrees with the plan.      See Patient Instructions  Patient Instructions   Reviewed nature of the left knee pain.  There appears to be some component patellofemoral source, given location of pain and what was described with activities.  We also discussed potential for lateral compartment component, potential cartilage issue, noting the x-ray findings as well as past history.  For next steps we discussed considerations around physical therapy, additional imaging with MRI, use of compression/support, also potential for anti-inflammatory medication or injection.  Following discussion, plan to proceed with physical therapy that has already been ordered.  Monitor course with PT at least 1 month to begin.  If not improving with physical therapy as desired, or if any questions or concerns in the meantime, contact clinic.  We discussed obtaining an MRI of the left knee if not getting desired improvement.  Okay to continue with use of knee sleeve with activities if desired.    If you have any further questions for your physician or physician s care team you can contact them thru MyChart or by calling [phone].      Farzad Davis [facility] [place]    -----  Chief Complaint   Patient presents with    Left Knee - Pain       SUBJECTIVE  Ines Gaitan is a/an 42 year old female who is seen as an Urgent Care referral for evaluation of Left Knee.     The patient is seen by themselves.    Onset:  "13 years(s) ago - worse in the last 2 months. Reports insidious onset without acute precipitating event. Patient reports being more active with work in the last two months.  Location of Pain: left lateral knee, just proximal and distal to the patella. 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  Additional history:  Recalls having issues with cartilage when in [place]. A number of years ago (13?). Did some therapy exercises, with some benefit. Eventually pain improved.  Recently (past ~2 months) pain flared again, question if from that previously noted cartilage issue in the knee.  Pain is usually more anterolateral left knee, can extend more laterally and more anteriorly.  Sometimes feels like knee locks with walking. Can limp at times.        REVIEW OF SYSTEMS:  Review of Systems    OBJECTIVE:  /80   Ht 1.6 m (5' 3\")   Wt 77.1 kg (170 lb)   LMP 06/30/2023 (Exact Date)   BMI 30.11 kg/m     General: healthy, alert and in no distress  Skin: no suspicious lesions or rash.  CV: distal perfusion intact   Resp: normal respiratory effort without conversational dyspnea   Psych: normal mood and affect  Gait: NORMAL  Neuro: Normal light sensory exam of extremity       Left Knee exam    Inspection:   No clear effusion   no ecchymosis    ROM:      Flexion 90 degrees       Extension grossly full degrees       Range of motion limited by pain, tightness    Patellar Motion:      Crepitus noted in the patellofemoral joint mild with slower motion    Tender:      lateral " patellar border       lateral joint line    Non Tender:       remainder of knee area    Special Tests:      neg (-) Mariah       neg (-) Lachman       neg (-) anterior drawer       neg (-) posterior drawer       neg (-) varus       neg (-) valgus       no pain with forced extension          RADIOLOGY:  Final results and radiologist's interpretation, available in the [facility] health record.  Images were reviewed with the patient in the office today.  My personal interpretation of the performed imaging: no acute bony abnormality noted. May have very slight lateral compartment narrowing, with mild hypertrophic change.        Narrative & Impression   KNEE LEFT THREE VIEWS  July 11, 2023 2:00 PM      HISTORY: Lateral knee pain after stairs. Acute pain of left knee.     COMPARISON: None.                                                                      IMPRESSION: Tiny lateral and patellofemoral compartment osteophytes.  No fracture, effusion or calcified intra-articular body.     ALMA DOWNEY MD            Review of prior external note(s) from -   Review of the result(s) of each unique test - imaging  Independent interpretation of a test performed by another physician/other qualified health care professional (not separately reported) - imaging

## 2023-07-25 NOTE — LETTER
7/25/2023         RE: Ines Gaitan  [address]  [place] [address]        Dear Colleague,    Thank you for referring your patient, Ines Gaitan, to the [facility] [place]. Please see a copy of my visit note below.    ASSESSMENT & PLAN    Ines was seen today for pain.    Diagnoses and all orders for this visit:    Left knee pain, unspecified chronicity  -     Orthopedic  Referral      Acute on chronic.  Potential for some chondrosis by history.  Desires PT next. Would also offer MRI if not improving.  Questions answered. Discussed signs and symptoms that may indicate more serious issues; the patient was instructed to seek appropriate care if noted. Ines indicates understanding of these issues and agrees with the plan.      See Patient Instructions  Patient Instructions   Reviewed nature of the left knee pain.  There appears to be some component patellofemoral source, given location of pain and what was described with activities.  We also discussed potential for lateral compartment component, potential cartilage issue, noting the x-ray findings as well as past history.  For next steps we discussed considerations around physical therapy, additional imaging with MRI, use of compression/support, also potential for anti-inflammatory medication or injection.  Following discussion, plan to proceed with physical therapy that has already been ordered.  Monitor course with PT at least 1 month to begin.  If not improving with physical therapy as desired, or if any questions or concerns in the meantime, contact clinic.  We discussed obtaining an MRI of the left knee if not getting desired improvement.  Okay to continue with use of knee sleeve with activities if desired.    If you have any further questions for your physician or physician s care team you can contact them thru [email or web address] or by calling [phone].      Farzad Davis, DO  [facility] [place]    -----  Chief Complaint   Patient presents with     Left Knee - Pain       SUBJECTIVE  Ines Gaitan is a/an 42 year old female who is seen as an Urgent Care referral for evaluation of Left Knee.     The patient is seen by themselves.    Onset: 13 years(s) ago - worse in the last 2 months. Reports insidious onset without acute precipitating event. Patient reports being more active with work in the last two months.  Location of Pain: left lateral knee, just proximal and distal to the patella. 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  Additional history:  Recalls having issues with cartilage when in [place]. A number of years ago (13?). Did some therapy exercises, with some benefit. Eventually pain improved.  Recently (past ~2 months) pain flared again, question if from that previously noted cartilage issue in the knee.  Pain is usually more anterolateral left knee, can extend more laterally and more anteriorly.  Sometimes feels like knee locks with walking. Can limp at times.        REVIEW OF SYSTEMS:  Review of Systems    OBJECTIVE:  /80   Ht 1.6 m (5' 3\")   Wt 77.1 kg (170 lb)   LMP 06/30/2023 (Exact Date)   BMI 30.11 kg/m     General: healthy, alert and in no distress  Skin: no suspicious lesions or rash.  CV: distal perfusion intact   Resp: normal respiratory effort without conversational dyspnea   Psych: normal mood and affect  Gait: NORMAL  Neuro: Normal light sensory exam of extremity       Left Knee " exam    Inspection:   No clear effusion   no ecchymosis    ROM:      Flexion 90 degrees       Extension grossly full degrees       Range of motion limited by pain, tightness    Patellar Motion:      Crepitus noted in the patellofemoral joint mild with slower motion    Tender:      lateral patellar border       lateral joint line    Non Tender:       remainder of knee area    Special Tests:      neg (-) Mariah       neg (-) Lachman       neg (-) anterior drawer       neg (-) posterior drawer       neg (-) varus       neg (-) valgus       no pain with forced extension          RADIOLOGY:  Final results and radiologist's interpretation, available in the [facility] health record.  Images were reviewed with the patient in the office today.  My personal interpretation of the performed imaging: no acute bony abnormality noted. May have very slight lateral compartment narrowing, with mild hypertrophic change.        Narrative & Impression   KNEE LEFT THREE VIEWS  July 11, 2023 2:00 PM      HISTORY: Lateral knee pain after stairs. Acute pain of left knee.     COMPARISON: None.                                                                      IMPRESSION: Tiny lateral and patellofemoral compartment osteophytes.  No fracture, effusion or calcified intra-articular body.     ALMA DOWNEY MD            Review of prior external note(s) from -   Review of the result(s) of each unique test - imaging  Independent interpretation of a test performed by another physician/other qualified health care professional (not separately reported) - imaging             Again, thank you for allowing me to participate in the care of your patient.        Sincerely,        Farzad Davis DO

## 2023-07-25 NOTE — PATIENT INSTRUCTIONS
Reviewed nature of the left knee pain.  There appears to be some component patellofemoral source, given location of pain and what was described with activities.  We also discussed potential for lateral compartment component, potential cartilage issue, noting the x-ray findings as well as past history.  For next steps we discussed considerations around physical therapy, additional imaging with MRI, use of compression/support, also potential for anti-inflammatory medication or injection.  Following discussion, plan to proceed with physical therapy that has already been ordered.  Monitor course with PT at least 1 month to begin.  If not improving with physical therapy as desired, or if any questions or concerns in the meantime, contact clinic.  We discussed obtaining an MRI of the left knee if not getting desired improvement.  Okay to continue with use of knee sleeve with activities if desired.    If you have any further questions for your physician or physician s care team you can contact them thru [email or web address] or by calling [phone].

## 2023-08-04 ENCOUNTER — THERAPY VISIT (OUTPATIENT)
Dept: PHYSICAL THERAPY | Facility: CLINIC | Age: 43
End: 2023-08-04
Payer: COMMERCIAL

## 2023-08-04 DIAGNOSIS — M25.562 LEFT KNEE PAIN: Primary | ICD-10-CM

## 2023-08-04 PROCEDURE — 97016 VASOPNEUMATIC DEVICE THERAPY: CPT | Mod: GP

## 2023-08-04 PROCEDURE — 97530 THERAPEUTIC ACTIVITIES: CPT | Mod: GP

## 2023-08-04 PROCEDURE — 97110 THERAPEUTIC EXERCISES: CPT | Mod: GP

## 2023-08-04 PROCEDURE — 97161 PT EVAL LOW COMPLEX 20 MIN: CPT | Mod: GP

## 2023-08-04 ASSESSMENT — ACTIVITIES OF DAILY LIVING (ADL)
WALK: ACTIVITY IS VERY DIFFICULT
SWELLING: THE SYMPTOM AFFECTS MY ACTIVITY MODERATELY
LIMPING: THE SYMPTOM AFFECTS MY ACTIVITY SLIGHTLY
KNEE_ACTIVITY_OF_DAILY_LIVING_SCORE: 42.86
HOW_WOULD_YOU_RATE_THE_CURRENT_FUNCTION_OF_YOUR_KNEE_DURING_YOUR_USUAL_DAILY_ACTIVITIES_ON_A_SCALE_FROM_0_TO_100_WITH_100_BEING_YOUR_LEVEL_OF_KNEE_FUNCTION_PRIOR_TO_YOUR_INJURY_AND_0_BEING_THE_INABILITY_TO_PERFORM_ANY_OF_YOUR_USUAL_DAILY_ACTIVITIES?: 40
RISE FROM A CHAIR: ACTIVITY IS FAIRLY DIFFICULT
STIFFNESS: THE SYMPTOM AFFECTS MY ACTIVITY MODERATELY
STAND: ACTIVITY IS FAIRLY DIFFICULT
SIT WITH YOUR KNEE BENT: ACTIVITY IS FAIRLY DIFFICULT
SQUAT: ACTIVITY IS SOMEWHAT DIFFICULT
KNEEL ON THE FRONT OF YOUR KNEE: ACTIVITY IS FAIRLY DIFFICULT
RAW_SCORE: 30
PAIN: THE SYMPTOM AFFECTS MY ACTIVITY SLIGHTLY
WEAKNESS: THE SYMPTOM AFFECTS MY ACTIVITY MODERATELY
AS_A_RESULT_OF_YOUR_KNEE_INJURY,_HOW_WOULD_YOU_RATE_YOUR_CURRENT_LEVEL_OF_DAILY_ACTIVITY?: ABNORMAL
GO DOWN STAIRS: ACTIVITY IS VERY DIFFICULT
HOW_WOULD_YOU_RATE_THE_OVERALL_FUNCTION_OF_YOUR_KNEE_DURING_YOUR_USUAL_DAILY_ACTIVITIES?: ABNORMAL
GIVING WAY, BUCKLING OR SHIFTING OF KNEE: THE SYMPTOM AFFECTS MY ACTIVITY SLIGHTLY
KNEE_ACTIVITY_OF_DAILY_LIVING_SUM: 30
GO UP STAIRS: ACTIVITY IS FAIRLY DIFFICULT

## 2023-08-04 NOTE — PROGRESS NOTES
PHYSICAL THERAPY EVALUATION  Type of Visit: Evaluation    See electronic medical record for Abuse and Falls Screening details.    Subjective       Presenting condition or subjective complaint: Patient reports to outpatient physical therapy with L knee pain that started mid-end of May. She had this pain before with some cartilage damage. She had exercises/PT to help with the pain. Patient notes that her activity level has recently increased and her inlaws have moved in with her so she is going up and down stairs more. She has also been walking and standing on her feet more. In June she was [place] a long period [place] in the airport and her pain was bad then too. She feels that her knee goes out to the side sometimes when she is getting out of the car. She has the most pain when she is walking and standing. Pain does not bother her that much when she is sitting. The pain is moderate and achy. She has been going for walks occasionally 4 miles at a time. She tried yesterday and the pain got worse throughout the walk. Climbing in and out of the car made it worse. 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 Stairs are also difficult, going down stairs is more painful than going up.     Date of onset: 05/15/23    Relevant medical history: Vision problems; Migraines or headaches   Dates & types of surgery:      Prior diagnostic imaging/testing results: X-ray     Prior therapy history for the same diagnosis, illness or injury: Yes 2010    Prior Level of Function  Transfers: Independent  Ambulation: Independent  ADL: Independent  IADL: Driving, Finances, Housekeeping, Laundry, Meal preparation, Medication management, Work    Living Environment  Social support: With family members   Type of home: House; 2-story   Stairs to enter the home: Yes 2     Ramp: No   Stairs inside the home: Yes 10 Is there a railing: Yes    Help at home: None  Equipment owned:       Employment: Yes    Hobbies/Interests: arts/painting    Patient goals for therapy: climb stairs, go for long walks, long standing    Pain assessment: Pain present     Objective   KNEE EVALUATION  PAIN: Pain Level at Rest: 5/10  Pain Level with Use: 8/10  INTEGUMENTARY (edema, incisions): 40 cm joint line L side, 42 cm suprapatellar L side; 39 cm joint line R side, 41 cm suprapatellar R side  POSTURE: WNL  GAIT:  Weightbearing Status: WBAT  Assistive Device(s): None reports has been wearing a compression sleeve at home  Gait Deviations: Antalgic Decreased TKE in stance phase, decreased push off on L side  BALANCE/PROPRIOCEPTION: Single Leg Stance Eyes Open (seconds): WNL bilaterally, no pain   ROM:  AROM: 0-130 deg bilaterally  STRENGTH:  knee ext: 5- R, 4- L, knee flex: 4+ R, 3+ L, hip abd: 4- bilaterally, hip ext: 4+ bilaterally  FLEXIBILITY:  heel to glute: slight pain in L knee with testing in prone, WNL bilaterally  SPECIAL TESTS:    Left Right   Apley's (Meniscus)     Mariah's (Meniscus) + Pain on lateral aspect of knee    Nydia's (ITB/TFL)     Patellar Apprehension Test     Patella Tracking     Ligamentous Stability     Anterior Drawer (ACL)     Posterior Drawer (PCL)     Prone Dial Test at 30 Deg and 90 Deg (PCL/PLC)     Valgus Stress Testing at 0 Deg and 30 Deg Negative,   Negative,     Varus Stress Testing at 0 Deg and 30 Deg  Positive,  + on lateral aspect of knee Negative,       FUNCTIONAL TESTS: Double Leg Squat: Anterior knee translation, Improper use of glutes/hips, and minimal knee bend with difficulty going into deep squat due to pain  PALPATION:  tenderness lateral aspect of knee and slightly distal (LCL); no tenderness medial joint line, MCL, infra or suprapatellar, retropatellar or along ITB    Assessment & Plan   CLINICAL IMPRESSIONS  Medical Diagnosis: L knee pain    Treatment Diagnosis:     Impression/Assessment: Patient is a 42 year old female with  L knee complaints.  The following significant findings have been identified: Pain, Decreased ROM/flexibility, Decreased strength, Impaired gait, Impaired muscle performance, and Decreased activity tolerance. These impairments interfere with their ability to perform self care tasks, work tasks, recreational activities, household chores, household mobility, and community mobility as compared to previous level of function.     Clinical Decision Making (Complexity):  Clinical Presentation: Stable/Uncomplicated  Clinical Presentation Rationale: based on medical and personal factors listed in PT evaluation  Clinical Decision Making (Complexity): Low complexity    PLAN OF CARE  Treatment Interventions:  Modalities: Cryotherapy, Vasoneumatic Device  Interventions: Gait Training, Manual Therapy, Neuromuscular Re-education, Therapeutic Activity, Therapeutic Exercise, Self-Care/Home Management    Long Term Goals     PT Goal 1  Goal Identifier: ambulation  Goal Description: patient will be able to walk 4 miles with 1/10 pain or less  Rationale: to maximize safety and independence with performance of ADLs and functional tasks;to maximize safety and independence within the home;to maximize safety and independence within the community;to maximize safety and independence with transportation  Goal Progress: patient has 8/10 pain with 4 miles of walking  Target Date: 10/13/23  PT Goal 2  Goal Identifier: stairs  Goal Description: patient will be able to ascend and descend stairs in reciprocal pattern with 1/10 pain or less  Rationale: to maximize safety and independence with performance of ADLs and functional tasks;to maximize safety and independence within the community;to maximize safety and independence within the home;to maximize safety and independence with self cares  Goal Progress: patient reports a step-to pattern and 8/10 pain down stairs  Target Date: 10/13/23      Frequency of Treatment: 1x/wk progressing to 1 x every  other week  Duration of Treatment: 2-3 months    Recommended Referrals to Other Professionals: n/a  Education Assessment:   Learner/Method: Patient    Risks and benefits of evaluation/treatment have been explained.   Patient/Family/caregiver agrees with Plan of Care.     Evaluation Time:     PT Eval, Low Complexity Minutes (30565): 15     Signing Clinician: Zee Davila PT

## 2023-08-11 ENCOUNTER — THERAPY VISIT (OUTPATIENT)
Dept: PHYSICAL THERAPY | Facility: CLINIC | Age: 43
End: 2023-08-11
Attending: FAMILY MEDICINE
Payer: COMMERCIAL

## 2023-08-11 DIAGNOSIS — M25.562 LEFT KNEE PAIN, UNSPECIFIED CHRONICITY: ICD-10-CM

## 2023-08-11 PROCEDURE — 97110 THERAPEUTIC EXERCISES: CPT | Mod: GP | Performed by: PHYSICAL THERAPIST

## 2023-10-09 ENCOUNTER — THERAPY VISIT (OUTPATIENT)
Dept: PHYSICAL THERAPY | Facility: CLINIC | Age: 43
End: 2023-10-09
Payer: COMMERCIAL

## 2023-10-09 DIAGNOSIS — M25.562 LEFT KNEE PAIN, UNSPECIFIED CHRONICITY: Primary | ICD-10-CM

## 2023-10-09 PROCEDURE — 97110 THERAPEUTIC EXERCISES: CPT | Mod: GP | Performed by: PHYSICAL THERAPIST

## 2023-10-10 NOTE — TELEPHONE ENCOUNTER
Requested Prescriptions   Pending Prescriptions Disp Refills     vitamin D2 (ERGOCALCIFEROL) 17183 units (1250 mcg) capsule 10 capsule 0     Sig: Take 1 capsule (50,000 Units) by mouth once a week       There is no refill protocol information for this order        Last Written Prescription Date:  3/4/19  Last Fill Quantity: 10,  # refills: 0   Last office visit: 3/4/2019 with prescribing provider:     Future Office Visit:      Routing refill request to provider for review/approval because:  Drug not on the [facility], P or [facility] refill protocol or controlled substance     Nasal Turnover Hinge Flap Text: The defect edges were debeveled with a #15 scalpel blade.  Given the size, depth, location of the defect and the defect being full thickness a nasal turnover hinge flap was deemed most appropriate. Using a sterile surgical marker, an appropriate hinge flap was drawn incorporating the defect. The area thus outlined was incised with a #15 scalpel blade. The flap was designed to recreate the nasal mucosal lining and the alar rim. The skin margins were undermined to an appropriate distance in all directions utilizing iris scissors. Following this, the designed flap was carried over into the primary defect and sutured into place

## 2023-10-17 ENCOUNTER — THERAPY VISIT (OUTPATIENT)
Dept: PHYSICAL THERAPY | Facility: CLINIC | Age: 43
End: 2023-10-17
Payer: COMMERCIAL

## 2023-10-17 DIAGNOSIS — M25.562 LEFT KNEE PAIN, UNSPECIFIED CHRONICITY: Primary | ICD-10-CM

## 2023-10-17 PROCEDURE — 97110 THERAPEUTIC EXERCISES: CPT | Mod: GP | Performed by: PHYSICAL THERAPIST

## 2023-10-17 PROCEDURE — 97530 THERAPEUTIC ACTIVITIES: CPT | Mod: GP | Performed by: PHYSICAL THERAPIST

## 2023-11-13 ENCOUNTER — THERAPY VISIT (OUTPATIENT)
Dept: PHYSICAL THERAPY | Facility: CLINIC | Age: 43
End: 2023-11-13
Payer: COMMERCIAL

## 2023-11-13 DIAGNOSIS — M25.562 LEFT KNEE PAIN, UNSPECIFIED CHRONICITY: Primary | ICD-10-CM

## 2023-11-13 PROCEDURE — 97110 THERAPEUTIC EXERCISES: CPT | Mod: GP

## 2023-12-14 ENCOUNTER — THERAPY VISIT (OUTPATIENT)
Dept: PHYSICAL THERAPY | Facility: CLINIC | Age: 43
End: 2023-12-14
Payer: COMMERCIAL

## 2023-12-14 DIAGNOSIS — M25.562 LEFT KNEE PAIN, UNSPECIFIED CHRONICITY: Primary | ICD-10-CM

## 2023-12-14 PROCEDURE — 97110 THERAPEUTIC EXERCISES: CPT | Mod: GP | Performed by: PHYSICAL THERAPIST

## 2024-06-17 ENCOUNTER — OFFICE VISIT (OUTPATIENT)
Dept: FAMILY MEDICINE | Facility: CLINIC | Age: 44
End: 2024-06-17
Payer: COMMERCIAL

## 2024-06-17 VITALS
BODY MASS INDEX: 30.12 KG/M2 | RESPIRATION RATE: 16 BRPM | WEIGHT: 170 LBS | TEMPERATURE: 98 F | HEIGHT: 63 IN | DIASTOLIC BLOOD PRESSURE: 76 MMHG | HEART RATE: 74 BPM | OXYGEN SATURATION: 100 % | SYSTOLIC BLOOD PRESSURE: 114 MMHG

## 2024-06-17 DIAGNOSIS — E03.8 SUBCLINICAL HYPOTHYROIDISM: ICD-10-CM

## 2024-06-17 DIAGNOSIS — M25.562 LEFT KNEE PAIN, UNSPECIFIED CHRONICITY: ICD-10-CM

## 2024-06-17 DIAGNOSIS — E66.09 CLASS 1 OBESITY DUE TO EXCESS CALORIES WITHOUT SERIOUS COMORBIDITY WITH BODY MASS INDEX (BMI) OF 32.0 TO 32.9 IN ADULT: Chronic | ICD-10-CM

## 2024-06-17 DIAGNOSIS — E66.811 CLASS 1 OBESITY DUE TO EXCESS CALORIES WITHOUT SERIOUS COMORBIDITY WITH BODY MASS INDEX (BMI) OF 32.0 TO 32.9 IN ADULT: Chronic | ICD-10-CM

## 2024-06-17 DIAGNOSIS — Z12.4 CERVICAL CANCER SCREENING: ICD-10-CM

## 2024-06-17 DIAGNOSIS — E78.5 SERUM LIPIDS HIGH: ICD-10-CM

## 2024-06-17 DIAGNOSIS — Z12.31 VISIT FOR SCREENING MAMMOGRAM: ICD-10-CM

## 2024-06-17 DIAGNOSIS — Z00.00 ROUTINE GENERAL MEDICAL EXAMINATION AT A HEALTH CARE FACILITY: Primary | ICD-10-CM

## 2024-06-17 DIAGNOSIS — R73.9 ELEVATED BLOOD SUGAR: ICD-10-CM

## 2024-06-17 DIAGNOSIS — F43.23 ADJUSTMENT DISORDER WITH MIXED ANXIETY AND DEPRESSED MOOD: ICD-10-CM

## 2024-06-17 DIAGNOSIS — D50.8 OTHER IRON DEFICIENCY ANEMIA: ICD-10-CM

## 2024-06-17 DIAGNOSIS — M25.50 MULTIPLE JOINT PAIN: ICD-10-CM

## 2024-06-17 LAB
ALBUMIN SERPL BCG-MCNC: 4.3 G/DL (ref 3.5–5.2)
ALP SERPL-CCNC: 55 U/L (ref 40–150)
ALT SERPL W P-5'-P-CCNC: 19 U/L (ref 0–50)
ANION GAP SERPL CALCULATED.3IONS-SCNC: 10 MMOL/L (ref 7–15)
AST SERPL W P-5'-P-CCNC: 27 U/L (ref 0–45)
BASOPHILS # BLD AUTO: 0 10E3/UL (ref 0–0.2)
BASOPHILS NFR BLD AUTO: 0 %
BILIRUB SERPL-MCNC: 0.3 MG/DL
BUN SERPL-MCNC: 12.7 MG/DL (ref 6–20)
CALCIUM SERPL-MCNC: 9.5 MG/DL (ref 8.6–10)
CHLORIDE SERPL-SCNC: 108 MMOL/L (ref 98–107)
CHOLEST SERPL-MCNC: 179 MG/DL
CREAT SERPL-MCNC: 0.71 MG/DL (ref 0.51–0.95)
DEPRECATED HCO3 PLAS-SCNC: 22 MMOL/L (ref 22–29)
EGFRCR SERPLBLD CKD-EPI 2021: >90 ML/MIN/1.73M2
EOSINOPHIL # BLD AUTO: 0.1 10E3/UL (ref 0–0.7)
EOSINOPHIL NFR BLD AUTO: 2 %
ERYTHROCYTE [DISTWIDTH] IN BLOOD BY AUTOMATED COUNT: 18.1 % (ref 10–15)
FASTING STATUS PATIENT QL REPORTED: YES
FASTING STATUS PATIENT QL REPORTED: YES
FERRITIN SERPL-MCNC: 7 NG/ML (ref 6–175)
GLUCOSE SERPL-MCNC: 87 MG/DL (ref 70–99)
HBA1C MFR BLD: 5.2 % (ref 0–5.6)
HCT VFR BLD AUTO: 33.1 % (ref 35–47)
HDLC SERPL-MCNC: 43 MG/DL
HGB BLD-MCNC: 9.8 G/DL (ref 11.7–15.7)
IMM GRANULOCYTES # BLD: 0 10E3/UL
IMM GRANULOCYTES NFR BLD: 0 %
IRON BINDING CAPACITY (ROCHE): 403 UG/DL (ref 240–430)
IRON SATN MFR SERPL: 4 % (ref 15–46)
IRON SERPL-MCNC: 15 UG/DL (ref 37–145)
LDLC SERPL CALC-MCNC: 103 MG/DL
LYMPHOCYTES # BLD AUTO: 1.4 10E3/UL (ref 0.8–5.3)
LYMPHOCYTES NFR BLD AUTO: 32 %
MCH RBC QN AUTO: 20.5 PG (ref 26.5–33)
MCHC RBC AUTO-ENTMCNC: 29.6 G/DL (ref 31.5–36.5)
MCV RBC AUTO: 69 FL (ref 78–100)
MONOCYTES # BLD AUTO: 0.4 10E3/UL (ref 0–1.3)
MONOCYTES NFR BLD AUTO: 10 %
NEUTROPHILS # BLD AUTO: 2.4 10E3/UL (ref 1.6–8.3)
NEUTROPHILS NFR BLD AUTO: 56 %
NONHDLC SERPL-MCNC: 136 MG/DL
PLATELET # BLD AUTO: 248 10E3/UL (ref 150–450)
POTASSIUM SERPL-SCNC: 3.9 MMOL/L (ref 3.4–5.3)
PROT SERPL-MCNC: 7.5 G/DL (ref 6.4–8.3)
RBC # BLD AUTO: 4.77 10E6/UL (ref 3.8–5.2)
RHEUMATOID FACT SERPL-ACNC: <10 IU/ML
SODIUM SERPL-SCNC: 140 MMOL/L (ref 135–145)
TRIGL SERPL-MCNC: 163 MG/DL
TSH SERPL DL<=0.005 MIU/L-ACNC: 3 UIU/ML (ref 0.3–4.2)
URATE SERPL-MCNC: 3 MG/DL (ref 2.4–5.7)
WBC # BLD AUTO: 4.3 10E3/UL (ref 4–11)

## 2024-06-17 PROCEDURE — 83036 HEMOGLOBIN GLYCOSYLATED A1C: CPT | Performed by: FAMILY MEDICINE

## 2024-06-17 PROCEDURE — 80061 LIPID PANEL: CPT | Performed by: FAMILY MEDICINE

## 2024-06-17 PROCEDURE — 86431 RHEUMATOID FACTOR QUANT: CPT | Performed by: FAMILY MEDICINE

## 2024-06-17 PROCEDURE — 90715 TDAP VACCINE 7 YRS/> IM: CPT | Performed by: FAMILY MEDICINE

## 2024-06-17 PROCEDURE — 82728 ASSAY OF FERRITIN: CPT | Performed by: FAMILY MEDICINE

## 2024-06-17 PROCEDURE — 36415 COLL VENOUS BLD VENIPUNCTURE: CPT | Performed by: FAMILY MEDICINE

## 2024-06-17 PROCEDURE — 99396 PREV VISIT EST AGE 40-64: CPT | Mod: 25 | Performed by: FAMILY MEDICINE

## 2024-06-17 PROCEDURE — 80053 COMPREHEN METABOLIC PANEL: CPT | Performed by: FAMILY MEDICINE

## 2024-06-17 PROCEDURE — 85025 COMPLETE CBC W/AUTO DIFF WBC: CPT | Performed by: FAMILY MEDICINE

## 2024-06-17 PROCEDURE — 90746 HEPB VACCINE 3 DOSE ADULT IM: CPT | Performed by: FAMILY MEDICINE

## 2024-06-17 PROCEDURE — 83540 ASSAY OF IRON: CPT | Performed by: FAMILY MEDICINE

## 2024-06-17 PROCEDURE — 87624 HPV HI-RISK TYP POOLED RSLT: CPT | Performed by: FAMILY MEDICINE

## 2024-06-17 PROCEDURE — 99214 OFFICE O/P EST MOD 30 MIN: CPT | Mod: 25 | Performed by: FAMILY MEDICINE

## 2024-06-17 PROCEDURE — 90472 IMMUNIZATION ADMIN EACH ADD: CPT | Performed by: FAMILY MEDICINE

## 2024-06-17 PROCEDURE — 90651 9VHPV VACCINE 2/3 DOSE IM: CPT | Performed by: FAMILY MEDICINE

## 2024-06-17 PROCEDURE — 90471 IMMUNIZATION ADMIN: CPT | Performed by: FAMILY MEDICINE

## 2024-06-17 PROCEDURE — 84550 ASSAY OF BLOOD/URIC ACID: CPT | Performed by: FAMILY MEDICINE

## 2024-06-17 PROCEDURE — 83550 IRON BINDING TEST: CPT | Performed by: FAMILY MEDICINE

## 2024-06-17 PROCEDURE — 84443 ASSAY THYROID STIM HORMONE: CPT | Performed by: FAMILY MEDICINE

## 2024-06-17 PROCEDURE — G0145 SCR C/V CYTO,THINLAYER,RESCR: HCPCS | Performed by: FAMILY MEDICINE

## 2024-06-17 RX ORDER — ESCITALOPRAM OXALATE 10 MG/1
10 TABLET ORAL DAILY
Qty: 90 TABLET | Refills: 0 | Status: SHIPPED | OUTPATIENT
Start: 2024-06-17 | End: 2024-08-13

## 2024-06-17 SDOH — HEALTH STABILITY: PHYSICAL HEALTH: ON AVERAGE, HOW MANY DAYS PER WEEK DO YOU ENGAGE IN MODERATE TO STRENUOUS EXERCISE (LIKE A BRISK WALK)?: 3 DAYS

## 2024-06-17 ASSESSMENT — ANXIETY QUESTIONNAIRES
8. IF YOU CHECKED OFF ANY PROBLEMS, HOW DIFFICULT HAVE THESE MADE IT FOR YOU TO DO YOUR WORK, TAKE CARE OF THINGS AT HOME, OR GET ALONG WITH OTHER PEOPLE?: SOMEWHAT DIFFICULT
2. NOT BEING ABLE TO STOP OR CONTROL WORRYING: SEVERAL DAYS
5. BEING SO RESTLESS THAT IT IS HARD TO SIT STILL: SEVERAL DAYS
4. TROUBLE RELAXING: SEVERAL DAYS
6. BECOMING EASILY ANNOYED OR IRRITABLE: MORE THAN HALF THE DAYS
7. FEELING AFRAID AS IF SOMETHING AWFUL MIGHT HAPPEN: NOT AT ALL
7. FEELING AFRAID AS IF SOMETHING AWFUL MIGHT HAPPEN: NOT AT ALL
GAD7 TOTAL SCORE: 7
GAD7 TOTAL SCORE: 7
IF YOU CHECKED OFF ANY PROBLEMS ON THIS QUESTIONNAIRE, HOW DIFFICULT HAVE THESE PROBLEMS MADE IT FOR YOU TO DO YOUR WORK, TAKE CARE OF THINGS AT HOME, OR GET ALONG WITH OTHER PEOPLE: SOMEWHAT DIFFICULT
1. FEELING NERVOUS, ANXIOUS, OR ON EDGE: SEVERAL DAYS
GAD7 TOTAL SCORE: 7
3. WORRYING TOO MUCH ABOUT DIFFERENT THINGS: SEVERAL DAYS

## 2024-06-17 ASSESSMENT — PAIN SCALES - GENERAL: PAINLEVEL: NO PAIN (0)

## 2024-06-17 ASSESSMENT — PATIENT HEALTH QUESTIONNAIRE - PHQ9
SUM OF ALL RESPONSES TO PHQ QUESTIONS 1-9: 9
10. IF YOU CHECKED OFF ANY PROBLEMS, HOW DIFFICULT HAVE THESE PROBLEMS MADE IT FOR YOU TO DO YOUR WORK, TAKE CARE OF THINGS AT HOME, OR GET ALONG WITH OTHER PEOPLE: SOMEWHAT DIFFICULT
SUM OF ALL RESPONSES TO PHQ QUESTIONS 1-9: 9

## 2024-06-17 ASSESSMENT — SOCIAL DETERMINANTS OF HEALTH (SDOH): HOW OFTEN DO YOU GET TOGETHER WITH FRIENDS OR RELATIVES?: ONCE A WEEK

## 2024-06-17 NOTE — PROGRESS NOTES
Preventive Care Visit  [facility] [place]  Sandroamirahcaroline Cesarteodoronoah DO [facility]ram, Family Medicine  Jun 17, 2024      1. Routine general medical examination at a health care facility  Patient doing well has several concerns today.  She is not on her routine vitamin supplementation.  - Comprehensive metabolic panel (BMP + Alb, Alk Phos, ALT, AST, Total. Bili, TP); Future  - CBC with platelets and differential; Future  - Comprehensive metabolic panel (BMP + Alb, Alk Phos, ALT, AST, Total. Bili, TP)  - CBC with platelets and differential    2. Other iron deficiency anemia  History of iron deficiency anemia.  She is not taking iron supplements.  Today her labs are abnormal.  As expected.  She is to restart her iron tabs with stool softener and follow-up in 4 to 6 weeks.  - Iron and iron binding capacity; Future  - Ferritin; Future  - Iron and iron binding capacity  - Ferritin  - OFFICE/OUTPT VISIT,EST,LEVL IV    3. Adjustment disorder with mixed anxiety and depressed mood  Long history of anxiety depression.  She has not used her prescribed medication.  She did not even start it.  She has done therapy in the past and is not interested in doing therapy again.  She would like to start Lexapro today.  Refilled meds.  Risks and benefits fully reviewed.  Follow-up in 4 weeks  - escitalopram (LEXAPRO) 10 MG tablet; Take 1 tablet (10 mg) by mouth daily  Dispense: 90 tablet; Refill: 0  - OFFICE/OUTPT VISIT,EST,LEVL IV    4. Subclinical hypothyroidism  Patient has no real symptoms.  Continue following her thyroid labs.  - TSH WITH FREE T4 REFLEX; Future  - TSH with free T4 reflex; Future  - TSH WITH FREE T4 REFLEX  - OFFICE/OUTPT VISIT,EST,LEVL IV    5. Class 1 obesity due to excess calories without serious comorbidity with body mass index (BMI) of 32.0 to 32.9 in adult  She has not been exercising due to musculoskeletal injury.  She is now feeling better advise restarting her routine exercises.  Dietary changes also  advised.  She has lost weight from the last visit.  Congratulated on her on her efforts and advised to continue doing what she has been doing.  - Lipid panel reflex to direct LDL Fasting; Future  - Lipid panel reflex to direct LDL Fasting  - OFFICE/OUTPT VISIT,EST,LEVL IV    6. Visit for screening mammogram    - MA Screening Bilateral w/ John; Future    7. Left knee pain, unspecified chronicity  Improving now.  She has had a history of shoulder and knee pains that comes and go.  Advise labs follow-up few weeks to reassess.  No swelling.  No history of family history of rheumatoid arthritis or autoimmune diseases to her knowledge.  - OFFICE/OUTPT VISIT,EST,LEVL IV    8. Multiple joint pain  As above  - Rheumatoid factor; Future  - Uric acid; Future  - Rheumatoid factor  - Uric acid  - OFFICE/OUTPT VISIT,EST,LEVL IV    9. Serum lipids high  Advised dietary and lifestyle changes.  Recheck labs  - Lipid panel reflex to direct LDL Fasting; Future  - Comprehensive metabolic panel (BMP + Alb, Alk Phos, ALT, AST, Total. Bili, TP); Future  - Lipid panel reflex to direct LDL Fasting  - Comprehensive metabolic panel (BMP + Alb, Alk Phos, ALT, AST, Total. Bili, TP)  - OFFICE/OUTPT VISIT,EST,LEVL IV    10. Elevated blood sugar  As above  - Comprehensive metabolic panel (BMP + Alb, Alk Phos, ALT, AST, Total. Bili, TP); Future  - Comprehensive metabolic panel (BMP + Alb, Alk Phos, ALT, AST, Total. Bili, TP)  - Hemoglobin A1c; Future  - Hemoglobin A1c  - OFFICE/OUTPT VISIT,EST,LEVL IV    11. Cervical cancer screening    - Pap Screen with HPV - Recommended Age 30 - 65 Years      Jann Chacon is a 43 year old, presenting for the following:  Physical        6/17/2024     7:49 AM   Additional Questions   Roomed by Flory        Health Care Directive  Patient does not have a Health Care Directive or Living Will: Discussed advance care planning with patient; however, patient declined at this time.    HPI  Knee and shoulder  pain  Not taking her vitamins    5 days a week exercising  Joint pain       General Health   How would you rate your overall physical health? (!) FAIR   Feel stress (tense, anxious, or unable to sleep) Only a little   (!) STRESS CONCERN      6/17/2024   Nutrition   Three or more servings of calcium each day? (!) I DON'T KNOW   Diet: Vegetarian/vegan   How many servings of fruit and vegetables per day? (!) 0-1   How many sweetened beverages each day? 0-1         6/17/2024   Exercise   Days per week of moderate/strenous exercise 3 days         6/17/2024   Social Factors   Frequency of gathering with friends or relatives Once a week   Worry food won't last until get money to buy more No   Food not last or not have enough money for food? No   Do you have housing?  Yes   Are you worried about losing your housing? No   Lack of transportation? No   Unable to get utilities (heat,electricity)? No         6/17/2024   Dental   Dentist two times every year? (!) NO         6/17/2024   TB Screening   Were you born outside of the [place]? Yes       Today's PHQ-9 Score:       6/17/2024     7:54 AM   PHQ-9 SCORE   PHQ-9 Total Score MyChart 9 (Mild depression)   PHQ-9 Total Score 9         6/17/2024   Substance Use   Alcohol more than 3/day or more than 7/wk Not Applicable   Do you use any other substances recreationally? No     Social History     Tobacco Use    Smoking status: Never    Smokeless tobacco: Never   Vaping Use    Vaping status: Never Used   Substance Use Topics    Alcohol use: No    Drug use: No           6/16/2022   LAST FHS-7 RESULTS   1st degree relative breast or ovarian cancer No   Any relative bilateral breast cancer No   Any male have breast cancer No   Any ONE woman have BOTH breast AND ovarian cancer No   Any woman with breast cancer before 50yrs No   2 or more relatives with breast AND/OR ovarian cancer No   2 or more relatives with breast AND/OR bowel cancer No        Mammogram Screening - Patient under 40 years  "of age: Routine Mammogram Screening not recommended.         2024   STI Screening   New sexual partner(s) since last STI/HIV test? No     History of abnormal Pap smear: No - age 30- 64 PAP with HPV every 5 years recommended        Latest Ref Rng & Units 3/4/2019     3:02 PM 3/4/2019    12:31 PM 9/15/2016     4:01 PM   PAP / HPV   PAP (Historical)  NIL      HPV 16 DNA NEG^Negative  Negative  Negative    HPV 18 DNA NEG^Negative  Negative  Negative    Other HR HPV NEG^Negative  Negative  Negative      ASCVD Risk   The 10-year ASCVD risk score (Elza PAZ, et al., 2019) is: 0.9%    Values used to calculate the score:      Age: 43 years      Sex: Female      Is Non- : No      Diabetic: No      Tobacco smoker: No      Systolic Blood Pressure: 114 mmHg      Is BP treated: No      HDL Cholesterol: 43 mg/dL      Total Cholesterol: 205 mg/dL        2024   Contraception/Family Planning   Questions about contraception or family planning No        Reviewed and updated as needed this visit by Provider                    Past Medical History:   Diagnosis Date    Female infertility 2019    MEDICAL HISTORY OF - 2006    infection of L5-S1.  ?abscess Treatment in [place]    Subclinical hyperthyroidism 2016    resolved; had positive TSI and normal thyroid ultrasound    Tuberculosis      Past Surgical History:   Procedure Laterality Date    HC TREATMENT MISSED  SURG, 1ST TRIMESTER  2016    LAPAROSCOPIC SALPINGECTOMY Left 2019    small hydrosalpinx         Review of Systems  Constitutional, HEENT, cardiovascular, pulmonary, GI, , musculoskeletal, neuro, skin, endocrine and psych systems are negative, except as otherwise noted.     Objective    Exam  There were no vitals taken for this visit.   Estimated body mass index is 30.11 kg/m  as calculated from the following:    Height as of 23: 1.6 m (5' 3\").    Weight as of 23: 77.1 kg (170 lb).    Physical Exam  GENERAL: alert " and no distress  EYES: Eyes grossly normal to inspection, PERRL and conjunctivae and sclerae normal  HENT: ear canals and TM's normal, nose and mouth without ulcers or lesions  NECK: no adenopathy, no asymmetry, masses, or scars  RESP: lungs clear to auscultation - no rales, rhonchi or wheezes  BREAST: normal without masses, tenderness or nipple discharge and no palpable axillary masses or adenopathy  CV: regular rate and rhythm, normal S1 S2, no S3 or S4, no murmur, click or rub, no peripheral edema  ABDOMEN: soft, nontender, no hepatosplenomegaly, no masses and bowel sounds normal   (female): normal female external genitalia, normal urethral meatus, normal vaginal mucosa  MS: no gross musculoskeletal defects noted, no edema  SKIN: no suspicious lesions or rashes  NEURO: Normal strength and tone, mentation intact and speech normal  PSYCH: mentation appears normal, affect normal/bright        Signed Electronically by: Guy Paulson DO

## 2024-06-17 NOTE — PATIENT INSTRUCTIONS
"  Your hemoglobin is low, please restart iron  Restart lexapro  Vit D  Follow up in 4 weeks then in 3 months  Please make those appointments    Guy Paulson D.O.      8/12/24 or after for third Hep B and HPV vaccines  Patient Education   Preventive Care Advice   This is general advice we often give to help people stay healthy. Your care team may have specific advice just for you. Please talk to your care team about your own preventive care needs.  Lifestyle  Exercise at least 150 minutes each week (30 minutes a day, 5 days a week).  Do muscle strengthening activities 2 days a week. These help control your weight and prevent disease.  No smoking.  Wear sunscreen to prevent skin cancer.  Have your home tested for radon every 2 to 5 years. Radon is a colorless, odorless gas that can harm your lungs. To learn more, go to www.health.[facility].mn.us and search for \"Radon in Homes.\"  Keep guns unloaded and locked up in a safe place like a safe or gun vault, or, use a gun lock and hide the keys. Always lock away bullets separately. To learn more, visit [employer].[place].gov and search for \"safe gun storage.\"  Nutrition  Eat 5 or more servings of fruits and vegetables each day.  Try wheat bread, brown rice and whole grain pasta (instead of white bread, rice, and pasta).  Get enough calcium and vitamin D. Check the label on foods and aim for 100% of the RDA (recommended daily allowance).  Regular exams  Have a dental exam and cleaning every 6 months.  See your health care team every year to talk about:  Any changes in your health.  Any medicines your care team has prescribed.  Preventive care, family planning, and ways to prevent chronic diseases.  Shots (vaccines)   HPV shots (up to age 26), if you've never had them before.  Hepatitis B shots (up to age 59), if you've never had them before.  COVID-19 shot: Get this shot when it's due.  Flu shot: Get a flu shot every year.  Tetanus shot: Get a tetanus shot every 10 years.  Pneumococcal, " hepatitis A, and RSV shots: Ask your care team if you need these based on your risk.  Shingles shot (for age 50 and up).  General health tests  Diabetes screening:  Starting at age 35, Get screened for diabetes at least every 3 years.  If you are younger than age 35, ask your care team if you should be screened for diabetes.  Cholesterol test: At age 39, start having a cholesterol test every 5 years, or more often if advised.  Bone density scan (DEXA): At age 50, ask your care team if you should have this scan for osteoporosis (brittle bones).  Hepatitis C: Get tested at least once in your life.  Abdominal aortic aneurysm screening: Talk to your doctor about having this screening if you:  Have ever smoked; and  Are biologically male; and  Are between the ages of 65 and 75.  STIs (sexually transmitted infections)  Before age 24: Ask your care team if you should be screened for STIs.  After age 24: Get screened for STIs if you're at risk. You are at risk for STIs (including HIV) if:  You are sexually active with more than one person.  You don't use condoms every time.  You or a partner was diagnosed with a sexually transmitted infection.  If you are at risk for HIV, ask about PrEP medicine to prevent HIV.  Get tested for HIV at least once in your life, whether you are at risk for HIV or not.  Cancer screening tests  Cervical cancer screening: If you have a cervix, begin getting regular cervical cancer screening tests at age 21. Most people who have regular screenings with normal results can stop after age 65. Talk about this with your provider.  Breast cancer scan (mammogram): If you've ever had breasts, begin having regular mammograms starting at age 40. This is a scan to check for breast cancer.  Colon cancer screening: It is important to start screening for colon cancer at age 45.  Have a colonoscopy test every 10 years (or more often if you're at risk) Or, ask your provider about stool tests like a FIT test every  year or Cologuard test every 3 years.  To learn more about your testing options, visit: www.[employer]/512212.pdf.  For help making a decision, visit: isabela/ze11086.  Prostate cancer screening test: If you have a prostate and are age 55 to 69, ask your provider if you would benefit from a yearly prostate cancer screening test.  Lung cancer screening: If you are a current or former smoker age 50 to 80, ask your care team if ongoing lung cancer screenings are right for you.  For informational purposes only. Not to replace the advice of your health care provider. Copyright   2023 [facility]. All rights reserved. Clinically reviewed by the [facility] Transitions Program. [employer] 080908 - REV 04/24.  Learning About Depression Screening  What is depression screening?  Depression screening is a way to see if you have depression symptoms. It may be done by a doctor or counselor. It's often part of a routine checkup. That's because your mental health is just as important as your physical health.  Depression is a mental health condition that affects how you feel, think, and act. You may:  Have less energy.  Lose interest in your daily activities.  Feel sad and grouchy for a long time.  Depression is very common. It affects people of all ages.  Many things can lead to depression. Some people become depressed after they have a stroke or find out they have a major illness like cancer or heart disease. The death of a loved one or a breakup may lead to depression. It can run in families. Most experts believe that a combination of inherited genes and stressful life events can cause it.  What happens during screening?  You may be asked to fill out a form about your depression symptoms. You and the doctor will discuss your answers. The doctor may ask you more questions to learn more about how you think, act, and feel.  What happens after screening?  If you have symptoms of depression, your doctor will talk  "to you about your options.  Doctors usually treat depression with medicines or counseling. Often, combining the two works best. Many people don't get help because they think that they'll get over the depression on their own. But people with depression may not get better unless they get treatment.  The cause of depression is not well understood. There may be many factors involved. But if you have depression, it's not your fault.  A serious symptom of depression is thinking about death or suicide. If you or someone you care about talks about this or about feeling hopeless, get help right away.  It's important to know that depression can be treated. Medicine, counseling, and self-care may help.  Where can you learn more?  Go to https://www.[employer].net/patiented  Enter T185 in the search box to learn more about \"Learning About Depression Screening.\"  Current as of: June 24, 2023               Content Version: 14.0    7290-3444 [employer].   Care instructions adapted under license by your healthcare professional. If you have questions about a medical condition or this instruction, always ask your healthcare professional. [employer] disclaims any warranty or liability for your use of this information.         "

## 2024-06-18 LAB
HPV HR 12 DNA CVX QL NAA+PROBE: NEGATIVE
HPV16 DNA CVX QL NAA+PROBE: NEGATIVE
HPV18 DNA CVX QL NAA+PROBE: NEGATIVE
HUMAN PAPILLOMA VIRUS FINAL DIAGNOSIS: NORMAL

## 2024-06-19 NOTE — RESULT ENCOUNTER NOTE
Your cholesterol is abnormal but better, your anemia and iron and vit D are low, please restart all supplememts as advised in the office.  Please contact me if you have any questions.  Take care,  Guy Paulson D.O.

## 2024-06-20 LAB
BKR LAB AP GYN ADEQUACY: NORMAL
BKR LAB AP GYN INTERPRETATION: NORMAL
BKR LAB AP PREVIOUS ABNORMAL: NORMAL
PATH REPORT.COMMENTS IMP SPEC: NORMAL
PATH REPORT.COMMENTS IMP SPEC: NORMAL
PATH REPORT.RELEVANT HX SPEC: NORMAL

## 2024-07-15 ENCOUNTER — OFFICE VISIT (OUTPATIENT)
Dept: FAMILY MEDICINE | Facility: CLINIC | Age: 44
End: 2024-07-15
Payer: COMMERCIAL

## 2024-07-15 DIAGNOSIS — R53.83 LOW ENERGY: ICD-10-CM

## 2024-07-15 DIAGNOSIS — E55.9 VITAMIN D DEFICIENCY: ICD-10-CM

## 2024-07-15 DIAGNOSIS — D50.8 OTHER IRON DEFICIENCY ANEMIA: ICD-10-CM

## 2024-07-15 DIAGNOSIS — E78.00 HIGH CHOLESTEROL: ICD-10-CM

## 2024-07-15 DIAGNOSIS — F43.23 ADJUSTMENT DISORDER WITH MIXED ANXIETY AND DEPRESSED MOOD: Primary | ICD-10-CM

## 2024-07-15 PROCEDURE — 99215 OFFICE O/P EST HI 40 MIN: CPT | Performed by: FAMILY MEDICINE

## 2024-07-15 PROCEDURE — G2211 COMPLEX E/M VISIT ADD ON: HCPCS | Performed by: FAMILY MEDICINE

## 2024-07-15 RX ORDER — ERGOCALCIFEROL 1.25 MG/1
50000 CAPSULE, LIQUID FILLED ORAL WEEKLY
Qty: 4 CAPSULE | Refills: 3 | Status: SHIPPED | OUTPATIENT
Start: 2024-07-15 | End: 2024-08-13

## 2024-07-15 RX ORDER — BUPROPION HYDROCHLORIDE 150 MG/1
150 TABLET ORAL EVERY MORNING
Qty: 90 TABLET | Refills: 0 | Status: SHIPPED | OUTPATIENT
Start: 2024-07-15 | End: 2024-08-13

## 2024-07-15 ASSESSMENT — ANXIETY QUESTIONNAIRES
3. WORRYING TOO MUCH ABOUT DIFFERENT THINGS: SEVERAL DAYS
8. IF YOU CHECKED OFF ANY PROBLEMS, HOW DIFFICULT HAVE THESE MADE IT FOR YOU TO DO YOUR WORK, TAKE CARE OF THINGS AT HOME, OR GET ALONG WITH OTHER PEOPLE?: SOMEWHAT DIFFICULT
6. BECOMING EASILY ANNOYED OR IRRITABLE: SEVERAL DAYS
GAD7 TOTAL SCORE: 5
IF YOU CHECKED OFF ANY PROBLEMS ON THIS QUESTIONNAIRE, HOW DIFFICULT HAVE THESE PROBLEMS MADE IT FOR YOU TO DO YOUR WORK, TAKE CARE OF THINGS AT HOME, OR GET ALONG WITH OTHER PEOPLE: SOMEWHAT DIFFICULT
GAD7 TOTAL SCORE: 5
1. FEELING NERVOUS, ANXIOUS, OR ON EDGE: SEVERAL DAYS
2. NOT BEING ABLE TO STOP OR CONTROL WORRYING: SEVERAL DAYS
7. FEELING AFRAID AS IF SOMETHING AWFUL MIGHT HAPPEN: NOT AT ALL
5. BEING SO RESTLESS THAT IT IS HARD TO SIT STILL: NOT AT ALL
GAD7 TOTAL SCORE: 5
4. TROUBLE RELAXING: SEVERAL DAYS

## 2024-07-15 NOTE — PROGRESS NOTES
1. 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  Advised starting Wellbutrin,Risks and benefits fully reviewed.   - buPROPion (WELLBUTRIN XL) 150 MG 24 hr tablet; Take 1 tablet (150 mg) by mouth every morning  Dispense: 90 tablet; Refill: 0    2. High cholesterol  Rescreen today  - Lipid panel reflex to direct LDL Fasting; Future    3. Low energy  ? Due to mood or anemia or vit d def vs other, advised close monitoring    4. Other iron deficiency anemia  As above , take supplements, recheck  - Ferrous Sulfate (IRON HIGH-POTENCY) 45 MG TBCR; Take 142 mg by mouth daily  Dispense: 90 tablet; Refill: 11  - Hemoglobin; Future    5. Vitamin D deficiency  Check labs after supplememts  - vitamin D2 (ERGOCALCIFEROL) 05642 units (1250 mcg) capsule; Take 1 capsule (50,000 Units) by mouth once a week  Dispense: 4 capsule; Refill: 3        Spent  46 min greater than 50% of counseling and coordination of care for the conditions documented above.   The longitudinal plan of care for the diagnosis(es)/condition(s) as documented were addressed during this visit. Due to the added complexity in care, I will continue to support Ines in the subsequent management and with ongoing continuity of care.    Jann Chacon is a 43 year old, presenting for the following health issues:  Chronic Disease Management        7/15/2024     7:53 AM   Additional Questions   Roomed by lino     History of Present Illness       Mental Health Follow-up:  Patient presents to follow-up on Depression & Anxiety.Patient's depression since last visit has been:  Medium  The patient is not having other symptoms associated with depression.  Patient's anxiety since last visit has been:  Medium  The patient is not having other symptoms associated with anxiety.  Any significant life events: job concerns and grief  or loss  Patient is not feeling anxious or having panic attacks.  Patient has no concerns about alcohol or drug use.    She eats 0-1 servings of fruits and vegetables daily.She consumes 1 sweetened beverage(s) daily.She exercises with enough effort to increase her heart rate 10 to 19 minutes per day.  She exercises with enough effort to increase her heart rate 3 or less days per week. She is missing 3 dose(s) of medications per week.       Lexapro -she gelt like she had sensation of things crawling on her skin after she stated the med    She thought it was due to her periods   When she is with people she is fine  She is sad when alone  Focusing is a problems    She does not want pills  She is busy for therapy        Review of Systems  Constitutional, HEENT, cardiovascular, pulmonary, GI, , musculoskeletal, neuro, skin, endocrine and psych systems are negative, except as otherwise noted.      Objective    LMP  (LMP Unknown)   There is no height or weight on file to calculate BMI.  Physical Exam   GENERAL: alert and no distress  EYES: Eyes grossly normal to inspection, PERRL and conjunctivae and sclerae normal  HENT: ear canals and TM's normal, nose and mouth without ulcers or lesions  NECK: no adenopathy, no asymmetry, masses, or scars  RESP: lungs clear to auscultation - no rales, rhonchi or wheezes  CV: regular rate and rhythm, normal S1 S2, no S3 or S4, no murmur, click or rub, no peripheral edema  ABDOMEN: soft, nontender, no hepatosplenomegaly, no masses and bowel sounds normal  MS: no gross musculoskeletal defects noted, no edema  No results found for any visits on 07/15/24.          Signed Electronically by: Guy Paulson DO

## 2024-07-22 ENCOUNTER — LAB (OUTPATIENT)
Dept: LAB | Facility: CLINIC | Age: 44
End: 2024-07-22
Payer: COMMERCIAL

## 2024-07-22 DIAGNOSIS — D50.8 OTHER IRON DEFICIENCY ANEMIA: ICD-10-CM

## 2024-07-22 DIAGNOSIS — E78.00 HIGH CHOLESTEROL: ICD-10-CM

## 2024-07-22 LAB
CHOLEST SERPL-MCNC: 175 MG/DL
FASTING STATUS PATIENT QL REPORTED: YES
HDLC SERPL-MCNC: 40 MG/DL
HGB BLD-MCNC: 9.8 G/DL (ref 11.7–15.7)
LDLC SERPL CALC-MCNC: 106 MG/DL
NONHDLC SERPL-MCNC: 135 MG/DL
TRIGL SERPL-MCNC: 144 MG/DL

## 2024-07-22 PROCEDURE — 85018 HEMOGLOBIN: CPT

## 2024-07-22 PROCEDURE — 80061 LIPID PANEL: CPT

## 2024-07-22 PROCEDURE — 36415 COLL VENOUS BLD VENIPUNCTURE: CPT

## 2024-08-01 NOTE — RESULT ENCOUNTER NOTE
"Your cholesterol is abnormal, please use the recommendations below and recheck labs in 6-12 months.    Ways to improve your cholesterol...    1- Eats less saturated fats (including avoiding \"trans\" fats).    2 - Eat more unsaturated fats  - found in vege  tables, grains, and tree nuts.   Also by replacing butter with canola oil or olive oil.    3 - Eat more nuts.   1-2 ounces (a small handful) of almonds, walnuts, hazelnuts or pecans once a  day in place of other less healthy snacks.    4 - Eat more high   fiber foods - vegetables and whole grains including oat bran, oats, beans, peas, and flax seed.    5 - Eat more fish - such as salmon, tuna, mackerel, and sardines.  1 or 2 six ounce servings per week is a healthy replacement for other proteins.    6 - E  xercise for at least 120 minutes per week - which is equal to 30 minutes 4 days per week.    Guy Paulson D.O.    "

## 2024-08-13 ENCOUNTER — OFFICE VISIT (OUTPATIENT)
Dept: FAMILY MEDICINE | Facility: CLINIC | Age: 44
End: 2024-08-13
Payer: COMMERCIAL

## 2024-08-13 VITALS
DIASTOLIC BLOOD PRESSURE: 74 MMHG | WEIGHT: 172 LBS | RESPIRATION RATE: 16 BRPM | SYSTOLIC BLOOD PRESSURE: 112 MMHG | HEART RATE: 76 BPM | BODY MASS INDEX: 30.48 KG/M2 | HEIGHT: 63 IN | TEMPERATURE: 98 F | OXYGEN SATURATION: 100 %

## 2024-08-13 DIAGNOSIS — F43.23 ADJUSTMENT DISORDER WITH MIXED ANXIETY AND DEPRESSED MOOD: ICD-10-CM

## 2024-08-13 DIAGNOSIS — Z87.42: ICD-10-CM

## 2024-08-13 DIAGNOSIS — N80.9 ENDOMETRIOSIS: ICD-10-CM

## 2024-08-13 DIAGNOSIS — E55.9 VITAMIN D DEFICIENCY: ICD-10-CM

## 2024-08-13 DIAGNOSIS — M54.50 ACUTE RIGHT-SIDED LOW BACK PAIN WITHOUT SCIATICA: Primary | ICD-10-CM

## 2024-08-13 DIAGNOSIS — N94.10 PAIN IN FEMALE GENITALIA ON INTERCOURSE: ICD-10-CM

## 2024-08-13 PROCEDURE — 99215 OFFICE O/P EST HI 40 MIN: CPT | Mod: 25 | Performed by: FAMILY MEDICINE

## 2024-08-13 PROCEDURE — 90746 HEPB VACCINE 3 DOSE ADULT IM: CPT | Performed by: FAMILY MEDICINE

## 2024-08-13 PROCEDURE — 90471 IMMUNIZATION ADMIN: CPT | Performed by: FAMILY MEDICINE

## 2024-08-13 RX ORDER — BUPROPION HYDROCHLORIDE 150 MG/1
150 TABLET ORAL EVERY MORNING
Qty: 90 TABLET | Refills: 0 | Status: SHIPPED | OUTPATIENT
Start: 2024-08-13

## 2024-08-13 RX ORDER — ERGOCALCIFEROL 1.25 MG/1
50000 CAPSULE, LIQUID FILLED ORAL WEEKLY
Qty: 4 CAPSULE | Refills: 3 | Status: SHIPPED | OUTPATIENT
Start: 2024-08-13

## 2024-08-13 ASSESSMENT — ANXIETY QUESTIONNAIRES
GAD7 TOTAL SCORE: 5
1. FEELING NERVOUS, ANXIOUS, OR ON EDGE: SEVERAL DAYS
5. BEING SO RESTLESS THAT IT IS HARD TO SIT STILL: NOT AT ALL
3. WORRYING TOO MUCH ABOUT DIFFERENT THINGS: SEVERAL DAYS
2. NOT BEING ABLE TO STOP OR CONTROL WORRYING: SEVERAL DAYS
GAD7 TOTAL SCORE: 5
8. IF YOU CHECKED OFF ANY PROBLEMS, HOW DIFFICULT HAVE THESE MADE IT FOR YOU TO DO YOUR WORK, TAKE CARE OF THINGS AT HOME, OR GET ALONG WITH OTHER PEOPLE?: SOMEWHAT DIFFICULT
7. FEELING AFRAID AS IF SOMETHING AWFUL MIGHT HAPPEN: NOT AT ALL
4. TROUBLE RELAXING: SEVERAL DAYS
6. BECOMING EASILY ANNOYED OR IRRITABLE: SEVERAL DAYS
IF YOU CHECKED OFF ANY PROBLEMS ON THIS QUESTIONNAIRE, HOW DIFFICULT HAVE THESE PROBLEMS MADE IT FOR YOU TO DO YOUR WORK, TAKE CARE OF THINGS AT HOME, OR GET ALONG WITH OTHER PEOPLE: SOMEWHAT DIFFICULT
GAD7 TOTAL SCORE: 5

## 2024-08-13 ASSESSMENT — PATIENT HEALTH QUESTIONNAIRE - PHQ9
SUM OF ALL RESPONSES TO PHQ QUESTIONS 1-9: 9
SUM OF ALL RESPONSES TO PHQ QUESTIONS 1-9: 9
10. IF YOU CHECKED OFF ANY PROBLEMS, HOW DIFFICULT HAVE THESE PROBLEMS MADE IT FOR YOU TO DO YOUR WORK, TAKE CARE OF THINGS AT HOME, OR GET ALONG WITH OTHER PEOPLE: SOMEWHAT DIFFICULT

## 2024-08-13 ASSESSMENT — PAIN SCALES - GENERAL: PAINLEVEL: NO PAIN (0)

## 2024-08-13 NOTE — PROGRESS NOTES
1. Acute right-sided low back pain without sciatica  Patient is reporting acute on chronic right-sided low back pain.  She had stopped doing Pilates for a long period of time and recently picked it up and is reporting some reproducible muscle tenderness in the right lower paraspinal muscle area.  No skin changes.  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  She has no fever no chills no systemic symptoms.  She is somewhat concerned of that history.  I did advise following up with spine specialist if not improved with PT.  She declined imaging test today.  Her neuroexam is stable today.  Advise close monitoring.  - Physical Therapy  Referral; Future  - Spine  Referral; Future    2. Adjustment disorder with mixed anxiety and depressed mood  Has been up-and-down but feels in the last 2 weeks has been on Wellbutrin she has noticed an improvement in her motivation.  She still has not gotten into therapy.  Strongly advise continuing current medication and getting into therapy.  Follow-up in 4 to 6 weeks.  - buPROPion (WELLBUTRIN XL) 150 MG 24 hr tablet; Take 1 tablet (150 mg) by mouth every morning  Dispense: 90 tablet; Refill: 0    3. Vitamin D deficiency  She has poor compliance in taking her vitamin D.  Strongly advise continuing her vitamin D weekly and recheck labs at the next visit  - vitamin D2 (ERGOCALCIFEROL) 36248 units (1250 mcg) capsule; Take 1 capsule (50,000 Units) by mouth once a week  Dispense: 4 capsule; Refill: 3    4. Endometriosis  At the end of the visit patient brought up that she was diagnosed with endometriosis with her IVF specialist.  She has had minor surgical intervention she is reporting now worsening her pain her.  Has decreased but does have pain with intercourse.  Patient is currently not on any contraception.  Strongly advise getting a pelvic ultrasound and  following up with gynecology.    5. Pain in female genitalia on intercourse  As above  - US Pelvic Complete with Transvaginal; Future    6. History of short menstrual cycle  - US Pelvic Complete with Transvaginal; Future          Spent  46 min greater than 50% of counseling and coordination of care for the conditions documented above.  The longitudinal plan of care for the diagnosis(es)/condition(s) as documented were addressed during this visit. Due to the added complexity in care, I will continue to support Ines in the subsequent management and with ongoing continuity of care.    The longitudinal plan of care for the diagnosis(es)/condition(s) as documented were addressed during this visit. Due to the added complexity in care, I will continue to support Ines in the subsequent management and with ongoing continuity of care.    The longitudinal plan of care for the diagnosis(es)/condition(s) as documented were addressed during this visit. Due to the added complexity in care, I will continue to support Ines in the subsequent management and with ongoing continuity of care.    Jann Chacon is a 43 year old, presenting for the following health issues:  Chronic Disease Management      8/13/2024    11:42 AM   Additional Questions   Roomed by Flory Magallon 3 of 3 Hep B given today.     History of Present Illness       Back Pain:  She presents for follow up of back pain. Patient's back pain is a new problem.    Original cause of back pain: not sure  First noticed back pain: in the last week  Patient feels back pain: constantlyLocation of back pain:  Right lower back and right hip  Description of back pain: sharp  Back pain spreads: nowhere    Since patient first noticed back pain, pain is: always present, but gets better and worse  Does back pain interfere with her job:  Yes  On a scale of 1-10 (10 being the worst), patient describes pain as:  6  What makes back pain worse: sitting, standing and twisting    Acupuncture: not tried  Acetaminophen: not tried  Activity or exercise: not helpful  Chiropractor:  Not tried  Cold: helpful  Heat: helpful  Massage: helpful  Muscle relaxants: not tried  NSAIDS: not tried  Opioids: not tried  Physical Therapy: not tried  Rest: helpful  Steroid Injection: not tried  Stretching: helpful  Surgery: not tried  TENS unit: not tried  Topical pain relievers: not tried  Other healthcare providers patient is seeing for back pain: Physical Therapist    Mental Health Follow-up:  Patient presents to follow-up on Depression & Anxiety.Patient's depression since last visit has been:  Medium  The patient is not having other symptoms associated with depression.  Patient's anxiety since last visit has been:  Better  The patient is not having other symptoms associated with anxiety.  Any significant life events: other  Patient is not feeling anxious or having panic attacks.  Patient has no concerns about alcohol or drug use.    Hyperlipidemia:  She presents for follow up of hyperlipidemia.   She is not taking medication to lower cholesterol. She is not having myalgia or other side effects to statin medications.    She eats 2-3 servings of fruits and vegetables daily.She consumes 1 sweetened beverage(s) daily.She exercises with enough effort to increase her heart rate 10 to 19 minutes per day.  She exercises with enough effort to increase her heart rate 4 days per week. She is missing 2 dose(s) of medications per week.     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      Objective    LMP  (LMP Unknown)   There is no height or weight on file to calculate BMI.  Physical Exam   GENERAL: alert and no distress  EYES: Eyes grossly normal to inspection, PERRL and conjunctivae and sclerae normal  HENT: ear canals and TM's normal, nose and mouth without ulcers or lesions  NECK: no adenopathy, no asymmetry, masses, or scars  RESP: lungs clear to auscultation - no rales, rhonchi or wheezes  CV: regular rate and rhythm, normal S1 S2, no S3 or S4, no murmur, click or rub, no peripheral edema  ABDOMEN: soft, nontender, no hepatosplenomegaly, no masses and bowel sounds normal  MS: reproducible right side paraspinal muscle tenderness, normal skin, no gross musculoskeletal defects noted, no edema  SKIN: no suspicious lesions or rashes  NEURO: Normal strength and tone, mentation intact and speech normal  PSYCH: mentation appears normal, affect normal/bright            Signed Electronically by: Guy Paulson DO

## 2024-08-13 NOTE — PATIENT INSTRUCTIONS
Please follow up with Dr Elliott, or lisa  Get transvaginal US after period    Take iron tabs with OJ every 2-3 days  Vit D  Continue on Wellbutrin daily as advised  Follow up in 6 weeks    We can do labs then  Go to physical therapy   If not better follow up with 6 weeks    Guy Paulson D.O.          Patient Education

## 2024-08-14 ENCOUNTER — THERAPY VISIT (OUTPATIENT)
Dept: PHYSICAL THERAPY | Facility: CLINIC | Age: 44
End: 2024-08-14
Attending: FAMILY MEDICINE
Payer: COMMERCIAL

## 2024-08-14 DIAGNOSIS — M54.50 ACUTE RIGHT-SIDED LOW BACK PAIN WITHOUT SCIATICA: ICD-10-CM

## 2024-08-14 PROCEDURE — 97140 MANUAL THERAPY 1/> REGIONS: CPT | Mod: GP | Performed by: PHYSICAL THERAPIST

## 2024-08-14 PROCEDURE — 97161 PT EVAL LOW COMPLEX 20 MIN: CPT | Mod: GP | Performed by: PHYSICAL THERAPIST

## 2024-09-03 ENCOUNTER — ANCILLARY PROCEDURE (OUTPATIENT)
Dept: ULTRASOUND IMAGING | Facility: CLINIC | Age: 44
End: 2024-09-03
Attending: FAMILY MEDICINE
Payer: COMMERCIAL

## 2024-09-03 DIAGNOSIS — N94.10 PAIN IN FEMALE GENITALIA ON INTERCOURSE: ICD-10-CM

## 2024-09-03 DIAGNOSIS — Z87.42: ICD-10-CM

## 2024-09-03 PROCEDURE — 76830 TRANSVAGINAL US NON-OB: CPT | Mod: TC | Performed by: RADIOLOGY

## 2024-09-03 PROCEDURE — 76856 US EXAM PELVIC COMPLETE: CPT | Mod: TC | Performed by: RADIOLOGY

## 2024-09-15 ENCOUNTER — HEALTH MAINTENANCE LETTER (OUTPATIENT)
Age: 44
End: 2024-09-15

## 2024-09-18 ENCOUNTER — OFFICE VISIT (OUTPATIENT)
Dept: URGENT CARE | Facility: URGENT CARE | Age: 44
End: 2024-09-18
Payer: COMMERCIAL

## 2024-09-18 ENCOUNTER — ANCILLARY PROCEDURE (OUTPATIENT)
Dept: GENERAL RADIOLOGY | Facility: CLINIC | Age: 44
End: 2024-09-18
Attending: STUDENT IN AN ORGANIZED HEALTH CARE EDUCATION/TRAINING PROGRAM
Payer: COMMERCIAL

## 2024-09-18 VITALS
TEMPERATURE: 98.5 F | WEIGHT: 176.4 LBS | RESPIRATION RATE: 20 BRPM | SYSTOLIC BLOOD PRESSURE: 140 MMHG | DIASTOLIC BLOOD PRESSURE: 97 MMHG | HEART RATE: 88 BPM | BODY MASS INDEX: 31.25 KG/M2 | OXYGEN SATURATION: 99 %

## 2024-09-18 DIAGNOSIS — R05.1 ACUTE COUGH: Primary | ICD-10-CM

## 2024-09-18 DIAGNOSIS — J01.20 ACUTE NON-RECURRENT ETHMOIDAL SINUSITIS: ICD-10-CM

## 2024-09-18 DIAGNOSIS — R05.1 ACUTE COUGH: ICD-10-CM

## 2024-09-18 LAB
DEPRECATED S PYO AG THROAT QL EIA: NEGATIVE
FLUAV AG SPEC QL IA: NEGATIVE
FLUBV AG SPEC QL IA: NEGATIVE
GROUP A STREP BY PCR: NOT DETECTED

## 2024-09-18 PROCEDURE — 71046 X-RAY EXAM CHEST 2 VIEWS: CPT | Mod: TC | Performed by: RADIOLOGY

## 2024-09-18 PROCEDURE — 87635 SARS-COV-2 COVID-19 AMP PRB: CPT | Performed by: STUDENT IN AN ORGANIZED HEALTH CARE EDUCATION/TRAINING PROGRAM

## 2024-09-18 PROCEDURE — 87804 INFLUENZA ASSAY W/OPTIC: CPT | Performed by: STUDENT IN AN ORGANIZED HEALTH CARE EDUCATION/TRAINING PROGRAM

## 2024-09-18 PROCEDURE — 87651 STREP A DNA AMP PROBE: CPT | Performed by: STUDENT IN AN ORGANIZED HEALTH CARE EDUCATION/TRAINING PROGRAM

## 2024-09-18 PROCEDURE — 99214 OFFICE O/P EST MOD 30 MIN: CPT | Performed by: STUDENT IN AN ORGANIZED HEALTH CARE EDUCATION/TRAINING PROGRAM

## 2024-09-18 RX ORDER — CODEINE PHOSPHATE AND GUAIFENESIN 10; 100 MG/5ML; MG/5ML
1-2 SOLUTION ORAL EVERY 4 HOURS PRN
Qty: 118 ML | Refills: 0 | Status: SHIPPED | OUTPATIENT
Start: 2024-09-18

## 2024-09-18 RX ORDER — PREDNISONE 20 MG/1
40 TABLET ORAL DAILY
Qty: 10 TABLET | Refills: 0 | Status: SHIPPED | OUTPATIENT
Start: 2024-09-18 | End: 2024-09-23

## 2024-09-18 RX ORDER — BENZONATATE 200 MG/1
200 CAPSULE ORAL 3 TIMES DAILY PRN
Qty: 30 CAPSULE | Refills: 0 | Status: SHIPPED | OUTPATIENT
Start: 2024-09-18

## 2024-09-18 RX ORDER — FLUCONAZOLE 150 MG/1
150 TABLET ORAL ONCE
Qty: 1 TABLET | Refills: 0 | Status: SHIPPED | OUTPATIENT
Start: 2024-09-18 | End: 2024-09-18

## 2024-09-18 NOTE — PATIENT INSTRUCTIONS
Prednisone steroid - once daily for 5 days    Antibiotic Augmentin to treat lung and sinus infection - twice daily for 7 days    Benzonatate pills for cough suppression - as needed up to 3 times daily    Robitussin with codeine - at bedtime or okay to take during the day if you are able to sleep    Bring the prescription for fluconazole if you develop symptoms of yeast infection    For gassiness - can take omeprazole (Prilosec) or Prevacid and Gas-X    Follow up if symptoms persist or worsen.      Gabby Monroe, CNP

## 2024-09-18 NOTE — PROGRESS NOTES
Assessment & Plan     Acute cough  Rapid strep and influenza negative.  Chest x-ray negative.  Patient has had a cough for the past 10 days that is productive of purulent sputum.  She is also having nasal congestion and purulent nasal discharge.  I advised treating the inflammation in her lungs with prednisone and reviewed its use and common side effects, benzonatate as needed for cough suppression and Robitussin AC at bedtime for cough suppression to help her get some sleep.  Advised not to take Robitussin AC during the day if she will need to be driving or working.  I am also going to treat her for sinus infection with Augmentin.  I gave her a paper prescription of fluconazole as she has had yeast infections in the past but she has not had many antibiotics in the past so she is not sure if that would trigger a yeast infection and she can fill it if needed.  - XR Chest 2 Views  - Streptococcus A Rapid Screen w/Reflex to PCR - Clinic Collect  - Symptomatic COVID-19 Virus (Coronavirus) by PCR Nose  - Influenza A & B Antigen - Clinic Collect  - Group A Streptococcus PCR Throat Swab  - predniSONE (DELTASONE) 20 MG tablet  Dispense: 10 tablet; Refill: 0  - amoxicillin-clavulanate (AUGMENTIN) 875-125 MG tablet  Dispense: 14 tablet; Refill: 0  - fluconazole (DIFLUCAN) 150 MG tablet  Dispense: 1 tablet; Refill: 0  - benzonatate (TESSALON) 200 MG capsule  Dispense: 30 capsule; Refill: 0  - guaiFENesin-codeine (ROBITUSSIN AC) 100-10 MG/5ML solution  Dispense: 118 mL; Refill: 0    Acute non-recurrent ethmoidal sinusitis  See above notes  - amoxicillin-clavulanate (AUGMENTIN) 875-125 MG tablet  Dispense: 14 tablet; Refill: 0  - fluconazole (DIFLUCAN) 150 MG tablet  Dispense: 1 tablet; Refill: 0           No follow-ups on file.    YOAN Mendoza [facility]     Ines is a 43 year old female who presents to clinic today for the following health issues:  Chief Complaint    Patient presents with    Cough     Cough, congestion, rhinorrhea, sore throat, body aches, fatigue, headache for the past 10 days she thought it was a viral illness and anticipated by now her symptoms would start to improved but they have worsened.       HPI    Patient Active Problem List   Diagnosis    CARDIOVASCULAR SCREENING; LDL GOAL LESS THAN 160    Chronic constipation    Vitamin D deficiency    Pain in thoracic spine    Iron deficiency anemia, unspecified iron deficiency anemia type    Subclinical hypothyroidism    Female infertility    Class 1 obesity due to excess calories without serious comorbidity with body mass index (BMI) of 32.0 to 32.9 in adult    Left knee pain    Acute right-sided low back pain without sciatica     Current Outpatient Medications   Medication Sig Dispense Refill    amoxicillin-clavulanate (AUGMENTIN) 875-125 MG tablet Take 1 tablet by mouth 2 times daily for 7 days. 14 tablet 0    benzonatate (TESSALON) 200 MG capsule Take 1 capsule (200 mg) by mouth 3 times daily as needed for cough. 30 capsule 0    buPROPion (WELLBUTRIN XL) 150 MG 24 hr tablet Take 1 tablet (150 mg) by mouth every morning 90 tablet 0    Ferrous Sulfate (IRON HIGH-POTENCY) 45 MG TBCR Take 142 mg by mouth daily 90 tablet 11    fluconazole (DIFLUCAN) 150 MG tablet Take 1 tablet (150 mg) by mouth once for 1 dose. 1 tablet 0    guaiFENesin-codeine (ROBITUSSIN AC) 100-10 MG/5ML solution Take 5-10 mLs by mouth every 4 hours as needed for cough. 118 mL 0    predniSONE (DELTASONE) 20 MG tablet Take 2 tablets (40 mg) by mouth daily for 5 days. 10 tablet 0    vitamin D2 (ERGOCALCIFEROL) 50798 units (1250 mcg) capsule Take 1 capsule (50,000 Units) by mouth once a week 4 capsule 3    loratadine (CLARITIN) 10 MG tablet Take 1 tablet (10 mg) by mouth daily (Patient not taking: Reported on 9/18/2024) 30 tablet 0    polyethylene glycol (MIRALAX) 17 GM/Dose powder Take 17 g (1 Capful) by mouth daily (Patient not taking: Reported  on 9/18/2024) 850 g 0     No current facility-administered medications for this visit.         Review of Systems  Constitutional, HEENT, cardiovascular, pulmonary, GI, , musculoskeletal, neuro, skin, endocrine and psych systems are negative, except as otherwise noted.      Objective    BP (!) 140/97   Pulse 88   Temp 98.5  F (36.9  C) (Tympanic)   Resp 20   Wt 80 kg (176 lb 6.4 oz)   SpO2 99%   BMI 31.25 kg/m    Physical Exam   GENERAL: alert and no distress  EYES: Eyes grossly normal to inspection, PERRL and conjunctivae and sclerae normal  HENT: normal cephalic/atraumatic, nose and mouth without ulcers or lesions, oral mucous membranes moist, and tonsillar erythema  RESP: lungs clear to auscultation - no rales, rhonchi or wheezes  CV: regular rate and rhythm, normal S1 S2, no S3 or S4, no murmur, click or rub  MS: no gross musculoskeletal defects noted, no edema  SKIN: no suspicious lesions or rashes  NEURO: Normal strength and tone, mentation intact and speech normal  PSYCH: mentation appears normal, affect normal/bright    Results for orders placed or performed in visit on 09/18/24 (from the past 24 hour(s))   Streptococcus A Rapid Screen w/Reflex to PCR - Clinic Collect    Specimen: Throat; Swab   Result Value Ref Range    Group A Strep antigen Negative Negative   Influenza A & B Antigen - Clinic Collect    Specimen: Nasopharyngeal; Swab   Result Value Ref Range    Influenza A antigen Negative Negative    Influenza B antigen Negative Negative    Narrative    Test results must be correlated with clinical data. If necessary, results should be confirmed by a molecular assay or viral culture.

## 2024-09-19 LAB — SARS-COV-2 RNA RESP QL NAA+PROBE: NEGATIVE

## 2024-09-30 ENCOUNTER — THERAPY VISIT (OUTPATIENT)
Dept: PHYSICAL THERAPY | Facility: CLINIC | Age: 44
End: 2024-09-30
Payer: COMMERCIAL

## 2024-09-30 DIAGNOSIS — M54.50 ACUTE RIGHT-SIDED LOW BACK PAIN WITHOUT SCIATICA: Primary | ICD-10-CM

## 2024-09-30 PROCEDURE — 97110 THERAPEUTIC EXERCISES: CPT | Mod: GP | Performed by: PHYSICAL THERAPIST

## 2024-09-30 NOTE — PROGRESS NOTES
09/30/24 0500   Appointment Info   Signing clinician's name / credentials Emile Zeng DPT   Total/Authorized Visits 10   Visits Used 2   Medical Diagnosis LBP   PT Tx Diagnosis LBP   Other pertinent information hx of tuberculosis in spine, hx LBP   Progress Note/Certification   Onset of illness/injury or Date of Surgery 07/24/24   Therapy Frequency 1x/wk   Predicted Duration 10wks   Progress Note Due Date 11/11/24   Progress Note Completed Date 09/30/24   PT Goal 1   Goal Identifier standing   Goal Description pt will be able to stand 1hour painfree.   Rationale to maximize safety and independence within the community   Goal Progress pt reports sitting or laying down is most painful, standing and walking is good.   Target Date 10/23/24   Subjective Report   Subjective Report pt reports overall feeling better, feels the exercises have helped, she also stopped pilates a few weeks ago and feels that has helped too. she travelled and then was sick but returns now to PT clinic feeling better but admits the back pain still is bothersome mostly in the mornings when waking and resolves with activity.   Objective Measures   Objective Measures Objective Measure 1   Objective Measure 1   Objective Measure symmetrical pelvic landmarks   Treatment Interventions (PT)   Interventions Therapeutic Procedure/Exercise   Therapeutic Procedure/Exercise   Therapeutic Procedures: strength, endurance, ROM, flexibility minutes (72647) 30   Therapeutic Procedures Ther Proc 9;Ther Proc 8;Ther Proc 7;Ther Proc 6;Ther Proc 5;Ther Proc 4;Ther Proc 3;Ther Proc 2   Ther Proc 2 SLR ext [id number] each side   Ther Proc 3 SLR abd [id number] each side   Ther Proc 4 bridges 3s [id number]   Ther Proc 5 clamshell [id number] each   Ther Proc 6 abd brace 3s [id number]   Ther Proc 1 TM walking 2.0mph x8mins   Skilled Intervention cues   Patient Response/Progress fatigue, painfree   Plan   Plan for next session next, abd heelslides, modified front and side planks,  BLE ext over plinth, lumbar ext over plinth   Total Session Time   Timed Code Treatment Minutes 30   Total Treatment Time (sum of timed and untimed services) 30       DENIA: 14    PLAN  Pt has been seen for 2 PT visits over the past 6wks and reports and demonstrates some overall improvement in AROM, pelvic landmarks and activity tolerance. She still demonstrates some general weakness in BLE and core and would benefit from continued PT 2x/month [id number].    Beginning/End Dates of Progress Note Reporting Period:  8/14/24 to 09/30/2024    Referring Provider:  Guy Paulson

## 2024-10-30 ENCOUNTER — OFFICE VISIT (OUTPATIENT)
Dept: OBGYN | Facility: CLINIC | Age: 44
End: 2024-10-30
Payer: COMMERCIAL

## 2024-10-30 VITALS
RESPIRATION RATE: 16 BRPM | HEART RATE: 75 BPM | BODY MASS INDEX: 30.82 KG/M2 | WEIGHT: 174 LBS | TEMPERATURE: 97.8 F | SYSTOLIC BLOOD PRESSURE: 116 MMHG | DIASTOLIC BLOOD PRESSURE: 76 MMHG

## 2024-10-30 DIAGNOSIS — M62.89 PELVIC FLOOR DYSFUNCTION: Primary | ICD-10-CM

## 2024-10-30 DIAGNOSIS — N80.9 ENDOMETRIOSIS: ICD-10-CM

## 2024-10-30 DIAGNOSIS — N97.9 FEMALE INFERTILITY: ICD-10-CM

## 2024-10-30 PROCEDURE — 99203 OFFICE O/P NEW LOW 30 MIN: CPT | Performed by: OBSTETRICS & GYNECOLOGY

## 2024-10-30 NOTE — PATIENT INSTRUCTIONS
Please come for a lab only appointment on day 3 of your next period.     Peter Chacon,   Here is the information for the fertility clinics we discussed:     [facility]   [email or web address]     [place] location:  [facility]  [address], [address]  [place] [address]  [phone]     [place] location:  [facility]  [address], [address]  [place], [place] [id number] [phone]  __________________________________________________    [facility]  [address] [place]  [phone]  __________________________________________________    [facility]  [email or web address]    [place] Location:  [address], [address]  [place] [address]     [place] Location:  [address], [address]  [place] [address] [phone]       Rolanda Rice MD

## 2024-10-30 NOTE — PROGRESS NOTES
GYN Progress Note     CC: Pelvic pain, dyspareunia     HISTORY OF PRESENT ILLNESS:    Ines Gaitan is a 43 year old  who presents for evaluation of pelvic pain and dyspareunia. She underwent a pelvic laparoscopy in 2019 where a small amount of endometriosis was seen and a hydrosalpinx was removed prior to an IVF cycle. She was having some pelvic pain at that time and did 6 months of medical treatment (which she thinks was lupron) prior to her second embryo transfer and her pain resolved. She has done several rounds of IVF, including an attempted cycle in [place] using a donor egg but none have been successful.     She has not been on any sort of hormonal suppression of her endometriosis and while she isn't actively pursuing fertility treatments, she is still hopeful that she would spontaneously conceive. She is getting regular monthly periods about every 25-26 days and will have one day of spotting prior to onset of bleeding.She reports that her bleeding is moderate. Her cramping has gotten more significant in the past few months but still manageable. Her prior concern is dyspareunia which is new over the past 3-4 months and she reports deep bilateral pelvic pain with intercourse that occurs regardless of sexual position. She denies vaginal dryness and denies pain at the vaginal introitus.        OB HISTORY:  OB History    Para Term  AB Living   4 0 0 0 4 0   SAB IAB Ectopic Multiple Live Births   4 0 0 0 0      # Outcome Date GA Lbr Ant/2nd Weight Sex Type Anes PTL Lv   4 SAB 10/01/20     SAB         Birth Comments: failed embryo transfer   3 20     SAB         Birth Comments: failed embryo transfer   2 SAB 10/2016     SAB      1 2015     SAB               GYN HISTORY:  She denies hx of abnormal pap smears or STIs but chart review shows an ASCUS pap in    Last pap was NIL with negative HPV in 2024        PAST MEDICAL HISTORY:  Past Medical History:   Diagnosis Date     Female infertility 2019    MEDICAL HISTORY OF - 2006    infection of L5-S1.  ?abscess Treatment in [place]    Subclinical hyperthyroidism 2016    resolved; had positive TSI and normal thyroid ultrasound    Tuberculosis 2007            PAST SURGICAL HISTORY:  Past Surgical History:   Procedure Laterality Date    HC TREATMENT MISSED  SURG, 1ST TRIMESTER  2016    LAPAROSCOPIC SALPINGECTOMY Left 2019    small hydrosalpinx          CURRENT MEDICATIONS:   Current Outpatient Medications   Medication Sig Dispense Refill    buPROPion (WELLBUTRIN XL) 150 MG 24 hr tablet Take 1 tablet (150 mg) by mouth every morning 90 tablet 0    Ferrous Sulfate (IRON HIGH-POTENCY) 45 MG TBCR Take 142 mg by mouth daily 90 tablet 11    vitamin D2 (ERGOCALCIFEROL) 70296 units (1250 mcg) capsule Take 1 capsule (50,000 Units) by mouth once a week 4 capsule 3    loratadine (CLARITIN) 10 MG tablet Take 1 tablet (10 mg) by mouth daily (Patient not taking: Reported on 10/30/2024) 30 tablet 0            ALLERGIES:  Patient has no known allergies.         SOCIAL HISTORY:  Social History     Tobacco Use    Smoking status: Never     Passive exposure: Never    Smokeless tobacco: Never   Vaping Use    Vaping status: Never Used   Substance Use Topics    Alcohol use: No    Drug use: No            FAMILY HISTORY:  Family History   Problem Relation Age of Onset    Hypertension Mother     Diabetes Father     Cerebrovascular Disease Father     Heart Surgery Father     Coronary Artery Disease Father     Thyroid Disease Sister     Cancer Maternal Grandmother         liver    Macular Degeneration No family hx of     Glaucoma No family hx of             REVIEW OF SYSTEMS:  See HPI        PHYSICAL EXAMINATION:  VS:/76 (BP Location: Left arm, Patient Position: Sitting, Cuff Size: Adult Large)   Pulse 75   Temp 97.8  F (36.6  C)   Resp 16   Wt 78.9 kg (174 lb)   LMP 10/14/2024 (Exact Date)   BMI 30.82 kg/m    Body mass index is 30.82  kg/m .    General: Patient alert and oriented, no acute distress  CV: no peripheral edema or cyanosis  Resp: normal respiratory effort and equal lung expansion  Abdomen: non-tender to light and deep palpation, no masses, organomegaly or hernia  : normal external genitalia without lesions. Urethral meatus normal, urethra and bladder non-tender to palpation. Vaginal mucosa normal in appearance without lesions, scant physiologic discharge. Cervix appears grossly normal.  Uterus normal size and contour, non-tender. No adnexal fullness or masses present. Increased pelvic floor tone noted with tenderness to palpation > on the R vs L.   Ext: non-tender, no edema        Laboratory values:  Imaging findings:        ASSESSMENT:  Ines Gaitan is a 43 year old  who presents for evaluation of the following concerns:       PLAN:  (M62.89) Pelvic floor dysfunction  (primary encounter diagnosis)  -We discussed that dyspareunia can be related to many different causes (vaginal dryness/atrophy, dermatologic conditions of the vulva, pelvic floor dysfunction, endometriosis, etc). She would prefer to defer medical and surgical treatment of endometriosis at this time (which is likely the underlying etiology of her pelvic floor dysfunction/dyspareunia) but is open to pelvic floor PT.  -We discussed that it typically takes 6-8 sessions to see improvement so I would recommend that she commit to doing the home exercises and following up consistently if she would like to pursue PT.   Plan: Physical Therapy  Referral      (N80.9) Endometriosis  -Patient had endometriosis documented by prior laparoscopy and symptoms improved following 6 months of Lupron therapy but are now worsening again (dysmenorrhea and dyspareunia).   -We reviewed medical and surgical management options including menstrual suppression with OCPs or other contraceptives, depo-lupron or orlissa and surgical management with repeat laparoscopy. We discussed  that hormonal medications are going to suppress ovulation and at this time she still wants to maintain the possibility of conceiving so does not want to start medical therapy at this time.   -Her primary symptom is the dyspareunia so she would like to try pelvic floor PT first.     (N97.9) Female infertility  -Patient had questions about using Clomid to conceive and we discussed that this would not be the recommended treatment for CLEO and that the treatment most likely to result in a viable pregnancy would be IVF with donor egg (which she tried in [place] but did not result in any embryos).   -I offered another referral to [facility] for a second opinion and we discussed repeating her day 3 labs to give her the most updated information when she meets with [facility] which she would like to do. Lab orders placed and info given on [facility] clinics.     Dispo: RTC in 3 months or sooner EMEKA Rice MD

## 2024-11-05 ENCOUNTER — THERAPY VISIT (OUTPATIENT)
Dept: PHYSICAL THERAPY | Facility: CLINIC | Age: 44
End: 2024-11-05
Payer: COMMERCIAL

## 2024-11-05 DIAGNOSIS — M54.50 ACUTE RIGHT-SIDED LOW BACK PAIN WITHOUT SCIATICA: Primary | ICD-10-CM

## 2024-11-05 PROCEDURE — 97110 THERAPEUTIC EXERCISES: CPT | Mod: GP | Performed by: PHYSICAL THERAPIST

## 2024-11-05 NOTE — PROGRESS NOTES
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   Rationale to maximize safety and independence within the community   Goal Progress standing is better, able to stand and walk 1hour painfree, tired and pain in hips at end of day however   Target Date 10/23/24   Date Met 11/05/24   Subjective Report   Subjective Report pt reports she was very sick with flu for two weeks and didn't do any exercises and then was busy preparing for [employer] celebration and still didn't get exercises in regularly.   Objective Measures   Objective Measures Objective Measure 2;Objective Measure 3   Objective Measure 1   Objective Measure pelvic landmarks   Details symmetrical landmarks   Objective Measure 2   Objective Measure AROM lumbar   Details full all planes with some pain in R lateral hip with RSB   Objective Measure 3   Objective Measure special tests   Details SLR neg, slump test neg   Therapeutic Procedure/Exercise   Therapeutic Procedures: strength, endurance, ROM, flexibility minutes (01296) 30   Ther Proc 1 TM walking 2.0mph x8mins   Ther Proc 3 SLR flx [id number] each   Ther Proc 4 bridges 3s [id number]   Ther Proc 5 clamshell [id number] each   Ther Proc 6 abd brace 3s [id number]   Skilled Intervention cues   Patient Response/Progress fatigue, painfree   Plan   Plan for next session next, abd heelslides, modified front and side planks, BLE ext over plinth, lumbar ext over plinth   Total Session Time   Timed Code Treatment Minutes 30    Total Treatment Time (sum of timed and untimed services) 30       DENIA: 16    PLAN  Pt was sick for a few weeks and busy with holiday celebration and hasn't been consistent with PT exercises, she had increased back pain with general body aches during sickness but since has more  B lateral hip discomfort. Current plan is to continue PT every 2-3wks.    Beginning/End Dates of Progress Note Reporting Period:   8/14/24 to 11/05/2024    Referring Provider:  Guy Paulson

## 2024-12-04 ENCOUNTER — LAB (OUTPATIENT)
Dept: LAB | Facility: CLINIC | Age: 44
End: 2024-12-04
Payer: COMMERCIAL

## 2024-12-04 ENCOUNTER — E-VISIT (OUTPATIENT)
Dept: URGENT CARE | Facility: CLINIC | Age: 44
End: 2024-12-04
Payer: COMMERCIAL

## 2024-12-04 DIAGNOSIS — N76.0 BV (BACTERIAL VAGINOSIS): Primary | ICD-10-CM

## 2024-12-04 DIAGNOSIS — B37.31 YEAST INFECTION OF THE VAGINA: ICD-10-CM

## 2024-12-04 DIAGNOSIS — N30.00 ACUTE CYSTITIS WITHOUT HEMATURIA: Primary | ICD-10-CM

## 2024-12-04 DIAGNOSIS — B96.89 BV (BACTERIAL VAGINOSIS): Primary | ICD-10-CM

## 2024-12-04 DIAGNOSIS — R30.0 DYSURIA: ICD-10-CM

## 2024-12-04 DIAGNOSIS — R30.0 DYSURIA: Primary | ICD-10-CM

## 2024-12-04 LAB
ALBUMIN UR-MCNC: NEGATIVE MG/DL
APPEARANCE UR: CLEAR
BACTERIA #/AREA URNS HPF: ABNORMAL /HPF
BILIRUB UR QL STRIP: NEGATIVE
CLUE CELLS: PRESENT
COLOR UR AUTO: YELLOW
GLUCOSE UR STRIP-MCNC: NEGATIVE MG/DL
HGB UR QL STRIP: NEGATIVE
KETONES UR STRIP-MCNC: NEGATIVE MG/DL
LEUKOCYTE ESTERASE UR QL STRIP: ABNORMAL
NITRATE UR QL: NEGATIVE
PH UR STRIP: 7 [PH] (ref 5–7)
RBC #/AREA URNS AUTO: ABNORMAL /HPF
SP GR UR STRIP: 1.02 (ref 1–1.03)
SQUAMOUS #/AREA URNS AUTO: ABNORMAL /LPF
TRICHOMONAS, WET PREP: ABNORMAL
UROBILINOGEN UR STRIP-ACNC: 0.2 E.U./DL
WBC #/AREA URNS AUTO: ABNORMAL /HPF
WBC'S/HIGH POWER FIELD, WET PREP: ABNORMAL
YEAST #/AREA URNS HPF: ABNORMAL /HPF
YEAST, WET PREP: PRESENT

## 2024-12-04 PROCEDURE — 87210 SMEAR WET MOUNT SALINE/INK: CPT

## 2024-12-04 PROCEDURE — 81001 URINALYSIS AUTO W/SCOPE: CPT

## 2024-12-04 RX ORDER — FLUCONAZOLE 150 MG/1
150 TABLET ORAL ONCE
Qty: 1 TABLET | Refills: 0 | Status: SHIPPED | OUTPATIENT
Start: 2024-12-04 | End: 2024-12-04

## 2024-12-04 RX ORDER — NITROFURANTOIN 25; 75 MG/1; MG/1
100 CAPSULE ORAL 2 TIMES DAILY
Qty: 10 CAPSULE | Refills: 0 | Status: SHIPPED | OUTPATIENT
Start: 2024-12-04 | End: 2024-12-09

## 2024-12-04 RX ORDER — METRONIDAZOLE 500 MG/1
500 TABLET ORAL 2 TIMES DAILY
Qty: 14 TABLET | Refills: 0 | Status: SHIPPED | OUTPATIENT
Start: 2024-12-04 | End: 2024-12-11

## 2024-12-04 NOTE — PATIENT INSTRUCTIONS
Dear Ines Gaitan,     After reviewing your responses, I would like you to come in for a urine test to make sure we treat you correctly. This urine test is to evaluate you for a possible urinary tract infection, and should be scheduled for today or tomorrow. Schedule a Lab Only appointment here.     Lab appointments are not available at most locations on the weekends. If no Lab Only appointment is available, you should be seen in any of our convenient Walk-in or Urgent Care Centers, which can be found on our website here.     You will receive instructions with your results in [place] once they are available.     If your symptoms worsen, you develop pain in your back or stomach, develop fevers, or are not improving in 5 days, please contact your primary care provider for an appointment or visit a Walk-in or Urgent Care Center to be seen.     Thanks again for choosing us as your health care partner,     YOAN GRIFFIN CNP

## 2024-12-26 ENCOUNTER — PATIENT OUTREACH (OUTPATIENT)
Dept: CARE COORDINATION | Facility: CLINIC | Age: 44
End: 2024-12-26
Payer: COMMERCIAL

## 2025-02-19 ENCOUNTER — OFFICE VISIT (OUTPATIENT)
Dept: FAMILY MEDICINE | Facility: CLINIC | Age: 45
End: 2025-02-19
Payer: COMMERCIAL

## 2025-02-19 DIAGNOSIS — Z91.199 NO-SHOW FOR APPOINTMENT: Primary | ICD-10-CM

## 2025-06-19 ENCOUNTER — OFFICE VISIT (OUTPATIENT)
Dept: FAMILY MEDICINE | Facility: CLINIC | Age: 45
End: 2025-06-19
Attending: FAMILY MEDICINE
Payer: COMMERCIAL

## 2025-06-19 VITALS
SYSTOLIC BLOOD PRESSURE: 110 MMHG | WEIGHT: 178 LBS | RESPIRATION RATE: 16 BRPM | BODY MASS INDEX: 31.54 KG/M2 | HEIGHT: 63 IN | DIASTOLIC BLOOD PRESSURE: 68 MMHG | OXYGEN SATURATION: 100 % | TEMPERATURE: 98 F | HEART RATE: 75 BPM

## 2025-06-19 DIAGNOSIS — Z12.11 SCREEN FOR COLON CANCER: ICD-10-CM

## 2025-06-19 DIAGNOSIS — Z12.31 VISIT FOR SCREENING MAMMOGRAM: ICD-10-CM

## 2025-06-19 DIAGNOSIS — Z00.00 ROUTINE GENERAL MEDICAL EXAMINATION AT A HEALTH CARE FACILITY: Primary | ICD-10-CM

## 2025-06-19 DIAGNOSIS — E03.8 SUBCLINICAL HYPOTHYROIDISM: ICD-10-CM

## 2025-06-19 DIAGNOSIS — E78.00 HIGH CHOLESTEROL: ICD-10-CM

## 2025-06-19 DIAGNOSIS — E61.1 IRON DEFICIENCY: ICD-10-CM

## 2025-06-19 DIAGNOSIS — N97.9 FEMALE INFERTILITY: ICD-10-CM

## 2025-06-19 DIAGNOSIS — Z13.1 SCREENING FOR DIABETES MELLITUS: ICD-10-CM

## 2025-06-19 DIAGNOSIS — E55.9 VITAMIN D DEFICIENCY: ICD-10-CM

## 2025-06-19 DIAGNOSIS — R30.0 DYSURIA: ICD-10-CM

## 2025-06-19 LAB
ALBUMIN SERPL BCG-MCNC: 4.4 G/DL (ref 3.5–5.2)
ALBUMIN UR-MCNC: NEGATIVE MG/DL
ALP SERPL-CCNC: 67 U/L (ref 40–150)
ALT SERPL W P-5'-P-CCNC: 46 U/L (ref 0–50)
ANION GAP SERPL CALCULATED.3IONS-SCNC: 11 MMOL/L (ref 7–15)
APPEARANCE UR: CLEAR
AST SERPL W P-5'-P-CCNC: 46 U/L (ref 0–45)
BASOPHILS # BLD AUTO: 0 10E3/UL (ref 0–0.2)
BASOPHILS NFR BLD AUTO: 1 %
BILIRUB SERPL-MCNC: 0.4 MG/DL
BILIRUB UR QL STRIP: NEGATIVE
BUN SERPL-MCNC: 8.6 MG/DL (ref 6–20)
CALCIUM SERPL-MCNC: 9.5 MG/DL (ref 8.8–10.4)
CHLORIDE SERPL-SCNC: 104 MMOL/L (ref 98–107)
CHOLEST SERPL-MCNC: 188 MG/DL
COLOR UR AUTO: YELLOW
CREAT SERPL-MCNC: 0.73 MG/DL (ref 0.51–0.95)
EGFRCR SERPLBLD CKD-EPI 2021: >90 ML/MIN/1.73M2
EOSINOPHIL # BLD AUTO: 0.1 10E3/UL (ref 0–0.7)
EOSINOPHIL NFR BLD AUTO: 1 %
ERYTHROCYTE [DISTWIDTH] IN BLOOD BY AUTOMATED COUNT: 19.1 % (ref 10–15)
EST. AVERAGE GLUCOSE BLD GHB EST-MCNC: 105 MG/DL
ESTRADIOL SERPL-MCNC: 52 PG/ML
FASTING STATUS PATIENT QL REPORTED: YES
FERRITIN SERPL-MCNC: 6 NG/ML (ref 6–175)
FSH SERPL IRP2-ACNC: 6.8 MIU/ML
GLUCOSE SERPL-MCNC: 88 MG/DL (ref 70–99)
GLUCOSE UR STRIP-MCNC: NEGATIVE MG/DL
HBA1C MFR BLD: 5.3 % (ref 0–5.6)
HCO3 SERPL-SCNC: 23 MMOL/L (ref 22–29)
HCT VFR BLD AUTO: 30.3 % (ref 35–47)
HDLC SERPL-MCNC: 51 MG/DL
HGB BLD-MCNC: 8.7 G/DL (ref 11.7–15.7)
HGB UR QL STRIP: NEGATIVE
IMM GRANULOCYTES # BLD: 0 10E3/UL
IMM GRANULOCYTES NFR BLD: 0 %
IRON BINDING CAPACITY (ROCHE): 387 UG/DL (ref 240–430)
IRON SATN MFR SERPL: 4 % (ref 15–46)
IRON SERPL-MCNC: 16 UG/DL (ref 37–145)
KETONES UR STRIP-MCNC: NEGATIVE MG/DL
LDLC SERPL CALC-MCNC: 113 MG/DL
LEUKOCYTE ESTERASE UR QL STRIP: NEGATIVE
LYMPHOCYTES # BLD AUTO: 1.1 10E3/UL (ref 0.8–5.3)
LYMPHOCYTES NFR BLD AUTO: 30 %
MCH RBC QN AUTO: 18.8 PG (ref 26.5–33)
MCHC RBC AUTO-ENTMCNC: 28.7 G/DL (ref 31.5–36.5)
MCV RBC AUTO: 65 FL (ref 78–100)
MIS SERPL-MCNC: 0.91 NG/ML
MONOCYTES # BLD AUTO: 0.3 10E3/UL (ref 0–1.3)
MONOCYTES NFR BLD AUTO: 8 %
NEUTROPHILS # BLD AUTO: 2.2 10E3/UL (ref 1.6–8.3)
NEUTROPHILS NFR BLD AUTO: 61 %
NITRATE UR QL: NEGATIVE
NONHDLC SERPL-MCNC: 137 MG/DL
PH UR STRIP: 7 [PH] (ref 5–7)
PLATELET # BLD AUTO: 258 10E3/UL (ref 150–450)
POTASSIUM SERPL-SCNC: 4.3 MMOL/L (ref 3.4–5.3)
PROT SERPL-MCNC: 7.5 G/DL (ref 6.4–8.3)
RBC # BLD AUTO: 4.63 10E6/UL (ref 3.8–5.2)
SODIUM SERPL-SCNC: 138 MMOL/L (ref 135–145)
SP GR UR STRIP: 1.02 (ref 1–1.03)
TRIGL SERPL-MCNC: 119 MG/DL
TSH SERPL DL<=0.005 MIU/L-ACNC: 4.06 UIU/ML (ref 0.3–4.2)
UROBILINOGEN UR STRIP-ACNC: 0.2 E.U./DL
VIT D+METAB SERPL-MCNC: 40 NG/ML (ref 20–50)
WBC # BLD AUTO: 3.7 10E3/UL (ref 4–11)

## 2025-06-19 SDOH — HEALTH STABILITY: PHYSICAL HEALTH: ON AVERAGE, HOW MANY DAYS PER WEEK DO YOU ENGAGE IN MODERATE TO STRENUOUS EXERCISE (LIKE A BRISK WALK)?: 3 DAYS

## 2025-06-19 SDOH — HEALTH STABILITY: PHYSICAL HEALTH: ON AVERAGE, HOW MANY MINUTES DO YOU ENGAGE IN EXERCISE AT THIS LEVEL?: 30 MIN

## 2025-06-19 ASSESSMENT — PAIN SCALES - GENERAL: PAINLEVEL_OUTOF10: NO PAIN (0)

## 2025-06-19 ASSESSMENT — SOCIAL DETERMINANTS OF HEALTH (SDOH): HOW OFTEN DO YOU GET TOGETHER WITH FRIENDS OR RELATIVES?: PATIENT DECLINED

## 2025-06-19 NOTE — PATIENT INSTRUCTIONS
Make appoint for follow up please  Start iron and vit D  Guy Paulson D.O.      Patient Education   Preventive Care Advice   This is general advice given by our system to help you stay healthy. However, your care team may have specific advice just for you. Please talk to your care team about your preventive care needs.  Nutrition  Eat 5 or more servings of fruits and vegetables each day.  Try wheat bread, brown rice and whole grain pasta (instead of white bread, rice, and pasta).  Get enough calcium and vitamin D. Check the label on foods and aim for 100% of the RDA (recommended daily allowance).  Lifestyle  Exercise at least 150 minutes each week  (30 minutes a day, 5 days a week).  Do muscle strengthening activities 2 days a week. These help control your weight and prevent disease.  No smoking.  Wear sunscreen to prevent skin cancer.  Have a dental exam and cleaning every 6 months.  Yearly exams  See your health care team every year to talk about:  Any changes in your health.  Any medicines your care team has prescribed.  Preventive care, family planning, and ways to prevent chronic diseases.  Shots (vaccines)   HPV shots (up to age 26), if you've never had them before.  Hepatitis B shots (up to age 59), if you've never had them before.  COVID-19 shot: Get this shot when it's due.  Flu shot: Get a flu shot every year.  Tetanus shot: Get a tetanus shot every 10 years.  Pneumococcal, hepatitis A, and RSV shots: Ask your care team if you need these based on your risk.  Shingles shot (for age 50 and up)  General health tests  Diabetes screening:  Starting at age 35, Get screened for diabetes at least every 3 years.  If you are younger than age 35, ask your care team if you should be screened for diabetes.  Cholesterol test: At age 39, start having a cholesterol test every 5 years, or more often if advised.  Bone density scan (DEXA): At age 50, ask your care team if you should have this scan for osteoporosis  (brittle bones).  Hepatitis C: Get tested at least once in your life.  STIs (sexually transmitted infections)  Before age 24: Ask your care team if you should be screened for STIs.  After age 24: Get screened for STIs if you're at risk. You are at risk for STIs (including HIV) if:  You are sexually active with more than one person.  You don't use condoms every time.  You or a partner was diagnosed with a sexually transmitted infection.  If you are at risk for HIV, ask about PrEP medicine to prevent HIV.  Get tested for HIV at least once in your life, whether you are at risk for HIV or not.  Cancer screening tests  Cervical cancer screening: If you have a cervix, begin getting regular cervical cancer screening tests starting at age 21.  Breast cancer scan (mammogram): If you've ever had breasts, begin having regular mammograms starting at age 40. This is a scan to check for breast cancer.  Colon cancer screening: It is important to start screening for colon cancer at age 45.  Have a colonoscopy test every 10 years (or more often if you're at risk) Or, ask your provider about stool tests like a FIT test every year or Cologuard test every 3 years.  To learn more about your testing options, visit:   .  For help making a decision, visit:   https://bit.ly/qp60667.  Prostate cancer screening test: If you have a prostate, ask your care team if a prostate cancer screening test (PSA) at age 55 is right for you.  Lung cancer screening: If you are a current or former smoker ages 50 to 80, ask your care team if ongoing lung cancer screenings are right for you.  For informational purposes only. Not to replace the advice of your health care provider. Copyright   2023 [employer]. All rights reserved. Clinically reviewed by the [facility] Transitions Program. [employer] 015941 - REV 01/24.  Learning About Stress  What is stress?     Stress is your body's response to a hard situation. Your body can have a  physical, emotional, or mental response. Stress is a fact of life for most people, and it affects everyone differently. What causes stress for you may not be stressful for someone else.  A lot of things can cause stress. You may feel stress when you go on a job interview, take a test, or run a race. This kind of short-term stress is normal and even useful. It can help you if you need to work hard or react quickly. For example, stress can help you finish an important job on time.  Long-term stress is caused by ongoing stressful situations or events. Examples of long-term stress include long-term health problems, ongoing problems at work, or conflicts in your family. Long-term stress can harm your health.  How does stress affect your health?  When you are stressed, your body responds as though you are in danger. It makes hormones that speed up your heart, make you breathe faster, and give you a burst of energy. This is called the fight-or-flight stress response. If the stress is over quickly, your body goes back to normal and no harm is done.  But if stress happens too often or lasts too long, it can have bad effects. Long-term stress can make you more likely to get sick, and it can make symptoms of some diseases worse. If you tense up when you are stressed, you may develop neck, shoulder, or low back pain. Stress is linked to high blood pressure and heart disease.  Stress also harms your emotional health. It can make you hamilton, tense, or depressed. Your relationships may suffer, and you may not do well at work or school.  What can you do to manage stress?  You can try these things to help manage stress:   Do something active. Exercise or activity can help reduce stress. Walking is a great way to get started. Even everyday activities such as housecleaning or yard work can help.  Try yoga or irais chi. These techniques combine exercise and meditation. You may need some training at first to learn them.  Do something you  "enjoy. For example, listen to music or go to a movie. Practice your hobby or do volunteer work.  Meditate. This can help you relax, because you are not worrying about what happened before or what may happen in the future.  Do guided imagery. Imagine yourself in any setting that helps you feel calm. You can use online videos, books, or a teacher to guide you.  Do breathing exercises. For example:  From a standing position, bend forward from the waist with your knees slightly bent. Let your arms dangle close to the floor.  Breathe in slowly and deeply as you return to a standing position. Roll up slowly and lift your head last.  Hold your breath for just a few seconds in the standing position.  Breathe out slowly and bend forward from the waist.  Let your feelings out. Talk, laugh, cry, and express anger when you need to. Talking with supportive friends or family, a counselor, or a yoni leader about your feelings is a healthy way to relieve stress. Avoid discussing your feelings with people who make you feel worse.  Write. It may help to write about things that are bothering you. This helps you find out how much stress you feel and what is causing it. When you know this, you can find better ways to cope.  What can you do to prevent stress?  You might try some of these things to help prevent stress:  Manage your time. This helps you find time to do the things you want and need to do.  Get enough sleep. Your body recovers from the stresses of the day while you are sleeping.  Get support. Your family, friends, and community can make a difference in how you experience stress.  Limit your news feed. Avoid or limit time on social media or news that may make you feel stressed.  Do something active. Exercise or activity can help reduce stress. Walking is a great way to get started.  Where can you learn more?  Go to https://www.healthwise.net/patiented  Enter N032 in the search box to learn more about \"Learning About " "Stress.\"  Current as of: October 24, 2024  Content Version: 14.5 2024-2025 [employer].   Care instructions adapted under license by your healthcare professional. If you have questions about a medical condition or this instruction, always ask your healthcare professional. [employer] disclaims any warranty or liability for your use of this information.       "

## 2025-06-19 NOTE — PROGRESS NOTES
Preventive Care Visit  [facility]  Guy Paulson DO, Family Medicine  Jun 19, 2025      Assessment & Plan     Routine general medical examination at a health care facility  Has other concerns    High cholesterol  Advised diet and exercises, , recheck labs   - Lipid panel reflex to direct LDL Fasting; Future  - Lipid panel reflex to direct LDL Fasting    Subclinical hypothyroidism  Recheck symptoms  - TSH WITH FREE T4 REFLEX; Future  - Comprehensive metabolic panel (BMP + Alb, Alk Phos, ALT, AST, Total. Bili, TP); Future  - TSH WITH FREE T4 REFLEX  - Comprehensive metabolic panel (BMP + Alb, Alk Phos, ALT, AST, Total. Bili, TP)    Vitamin D deficiency  Resume vit D   - Comprehensive metabolic panel (BMP + Alb, Alk Phos, ALT, AST, Total. Bili, TP); Future  - Vitamin D Deficiency; Future  - Comprehensive metabolic panel (BMP + Alb, Alk Phos, ALT, AST, Total. Bili, TP)  - Vitamin D Deficiency    Iron deficiency  Anemia, not taking iron, no bleeding no other symptoms, advised restarting iron with vit C  - Iron and iron binding capacity; Future  - Ferritin; Future  - Iron and iron binding capacity  - Ferritin  - CBC with platelets and differential; Future  - CBC with platelets and differential    Visit for screening mammogram    - MA Screen Bilateral w/John; Future    Screen for colon cancer  Advised colonoscopy,  - Colonoscopy Screening  Referral; Future  - Comprehensive metabolic panel (BMP + Alb, Alk Phos, ALT, AST, Total. Bili, TP); Future  - Comprehensive metabolic panel (BMP + Alb, Alk Phos, ALT, AST, Total. Bili, TP)    Screening for diabetes mellitus    - Hemoglobin A1c; Future  - Hemoglobin A1c    Dysuria  Normal results   - UA Macroscopic with reflex to Microscopic and Culture - Lab Collect; Future  - UA Macroscopic with reflex to Microscopic and Culture - Lab Collect    Female infertility  She has worked with OBGYN and endocrine, not working with fertitlity clinic  "anymore  - Mullerian Hormone Antibody  - Follicle stimulating hormone  - Estradiol    Patient has been advised of split billing requirements and indicates understanding: Yes  The longitudinal plan of care for the diagnosis(es)/condition(s) as documented were addressed during this visit. Due to the added complexity in care, I will continue to support Ines in the subsequent management and with ongoing continuity of care.        BMI  Estimated body mass index is 31.53 kg/m  as calculated from the following:    Height as of this encounter: 1.6 m (5' 3\").    Weight as of this encounter: 80.7 kg (178 lb).   Weight management plan: Discussed healthy diet and exercise guidelines  Reviewed preventive health counseling, as reflected in patient instructions       Regular exercise       Healthy diet/nutrition       Vision screening  Counseling  Appropriate preventive services were addressed with this patient via screening, questionnaire, or discussion as appropriate for fall prevention, nutrition, physical activity, Tobacco-use cessation, social engagement, weight loss and cognition.  Checklist reviewing preventive services available has been given to the patient.  Reviewed patient's diet, addressing concerns and/or questions.   She is at risk for lack of exercise and has been provided with information to increase physical activity for the benefit of her well-being.   The patient was instructed to see the dentist every 6 months.   She is at risk for psychosocial distress and has been provided with information to reduce risk.         Follow-up    Follow-up Visit   Expected date:  Jun 19, 2026 (Approximate)      Follow Up Appointment Details:     Follow-up with whom?: PCP    Follow-Up for what?: Adult Preventive    How?: In Person                 Jann Chacon is a 44 year old, presenting for the following:  Physical        6/19/2025    10:19 AM   Additional Questions   Roomed by lino Bautista toes. She " describes as cold from the inside. Some numbness. Not much has chaanged since the last time she discussed this with dr schroeder.       Low Bp issues at times.     Periods have changed. Worries her. Very little flow just lasting two days. Last period start on June 10th.        Advance Care Planning    Discussed advance care planning with patient; informed AVS has link to [facility].        6/19/2025   General Health   How would you rate your overall physical health? (!) FAIR   Feel stress (tense, anxious, or unable to sleep) To some extent   (!) STRESS CONCERN      6/19/2025   Nutrition   Three or more servings of calcium each day? Yes   Diet: Vegetarian/vegan   How many servings of fruit and vegetables per day? (!) 2-3   How many sweetened beverages each day? 0-1         6/19/2025   Exercise   Days per week of moderate/strenous exercise 3 days   Average minutes spent exercising at this level 30 min         6/19/2025   Social Factors   Frequency of gathering with friends or relatives Patient declined   Worry food won't last until get money to buy more No   Food not last or not have enough money for food? No   Do you have housing? (Housing is defined as stable permanent housing and does not include staying outside in a car, in a tent, in an abandoned building, in an overnight shelter, or couch-surfing.) Yes   Are you worried about losing your housing? No   Lack of transportation? No   Unable to get utilities (heat,electricity)? No         6/19/2025   Dental   Dentist two times every year? (!) NO         Today's PHQ-2 Score:       6/19/2025    10:23 AM   PHQ-2 ( 1999 [employer])   Q1: Little interest or pleasure in doing things 1   Q2: Feeling down, depressed or hopeless 1   PHQ-2 Score 2    Q1: Little interest or pleasure in doing things Several days   Q2: Feeling down, depressed or hopeless Several days   PHQ-2 Score 2       Patient-reported           6/19/2025   Substance Use   Alcohol more than 3/day or more than  7/wk No   Do you use any other substances recreationally? No     Social History     Tobacco Use    Smoking status: Never     Passive exposure: Never    Smokeless tobacco: Never   Vaping Use    Vaping status: Never Used   Substance Use Topics    Alcohol use: No    Drug use: No           6/16/2022   LAST FHS-7 RESULTS   1st degree relative breast or ovarian cancer No   Any relative bilateral breast cancer No   Any male have breast cancer No   Any ONE woman have BOTH breast AND ovarian cancer No   Any woman with breast cancer before 50yrs No   2 or more relatives with breast AND/OR ovarian cancer No   2 or more relatives with breast AND/OR bowel cancer No        Mammogram Screening - Mammogram every 1-2 years updated in Health Maintenance based on mutual decision making        6/19/2025   STI Screening   New sexual partner(s) since last STI/HIV test? No     History of abnormal Pap smear: No - age 20 - 64 - Patient with other risk factor requiring more frequent screening - see link Cervical Cytology Screening Guidelines        Latest Ref Rng & Units 6/17/2024     8:13 AM 3/4/2019     3:02 PM 3/4/2019    12:31 PM   PAP / HPV   PAP  Negative for Intraepithelial Lesion or Malignancy (NILM)      PAP (Historical)   NIL     HPV 16 DNA Negative Negative   Negative    HPV 18 DNA Negative Negative   Negative    Other HR HPV Negative Negative   Negative      ASCVD Risk   The 10-year ASCVD risk score (Elza DK, et al., 2019) is: 0.8%    Values used to calculate the score:      Age: 44 years      Sex: Female      Is Non- : No      Diabetic: No      Tobacco smoker: No      Systolic Blood Pressure: 110 mmHg      Is BP treated: No      HDL Cholesterol: 40 mg/dL      Total Cholesterol: 175 mg/dL        6/19/2025   Contraception/Family Planning   Questions about contraception or family planning No   What are your periods like? Light Flow        Reviewed and updated as needed this visit by Provider        "             Past Medical History:   Diagnosis Date    Female infertility 2019    MEDICAL HISTORY OF - 2006    infection of L5-S1.  ?abscess Treatment in [place]    Subclinical hyperthyroidism 2016    resolved; had positive TSI and normal thyroid ultrasound    Tuberculosis      Past Surgical History:   Procedure Laterality Date    HC TREATMENT MISSED  SURG, 1ST TRIMESTER  2016    LAPAROSCOPIC SALPINGECTOMY Left 2019    small hydrosalpinx     OB History    Para Term  AB Living   4 0 0 0 4 0   SAB IAB Ectopic Multiple Live Births   4 0 0 0 0      # Outcome Date GA Lbr Ant/2nd Weight Sex Type Anes PTL Lv   4 SAB 10/01/20     SAB         Birth Comments: failed embryo transfer   3 SAB 20     SAB         Birth Comments: failed embryo transfer   2 SAB 10/2016     SAB      1 2015     SAB            Review of Systems  Constitutional, HEENT, cardiovascular, pulmonary, GI, , musculoskeletal, neuro, skin, endocrine and psych systems are negative, except as otherwise noted.     Objective    Exam  /68   Pulse 75   Temp 98  F (36.7  C) (Oral)   Resp 16   Ht 1.6 m (5' 3\")   Wt 80.7 kg (178 lb)   LMP 06/10/2025   SpO2 100%   BMI 31.53 kg/m     Estimated body mass index is 31.53 kg/m  as calculated from the following:    Height as of this encounter: 1.6 m (5' 3\").    Weight as of this encounter: 80.7 kg (178 lb).    Physical Exam  GENERAL: alert and no distress  EYES: Eyes grossly normal to inspection, PERRL and conjunctivae and sclerae normal  HENT: ear canals and TM's normal, nose and mouth without ulcers or lesions  NECK: no adenopathy, no asymmetry, masses, or scars  RESP: lungs clear to auscultation - no rales, rhonchi or wheezes  BREAST: normal without masses, tenderness or nipple discharge and no palpable axillary masses or adenopathy  CV: regular rate and rhythm, normal S1 S2, no S3 or S4, no murmur, click or rub, no peripheral edema  ABDOMEN: soft, nontender, no " hepatosplenomegaly, no masses and bowel sounds normal   (female): normal female external genitalia, normal urethral meatus, normal vaginal mucosa   (female) w/bimanual: normal female external genitalia, normal urethral meatus, normal vaginal mucosa, and normal cervix/adnexa/uterus without masses or discharge  MS: no gross musculoskeletal defects noted, no edema  SKIN: no suspicious lesions or rashes  NEURO: Normal strength and tone, mentation intact and speech normal  PSYCH: mentation appears normal, affect normal/bright        Signed Electronically by: Guy Paulson DO

## 2025-06-20 ENCOUNTER — RESULTS FOLLOW-UP (OUTPATIENT)
Dept: OBGYN | Facility: CLINIC | Age: 45
End: 2025-06-20

## 2025-06-21 ENCOUNTER — ANCILLARY PROCEDURE (OUTPATIENT)
Dept: MAMMOGRAPHY | Facility: CLINIC | Age: 45
End: 2025-06-21
Attending: FAMILY MEDICINE
Payer: COMMERCIAL

## 2025-06-21 DIAGNOSIS — Z12.31 VISIT FOR SCREENING MAMMOGRAM: ICD-10-CM

## 2025-06-21 PROCEDURE — 77067 SCR MAMMO BI INCL CAD: CPT | Mod: TC | Performed by: RADIOLOGY

## 2025-06-21 PROCEDURE — 77063 BREAST TOMOSYNTHESIS BI: CPT | Mod: TC | Performed by: RADIOLOGY

## 2025-07-01 ENCOUNTER — HOSPITAL ENCOUNTER (OUTPATIENT)
Facility: AMBULATORY SURGERY CENTER | Age: 45
End: 2025-07-01
Attending: STUDENT IN AN ORGANIZED HEALTH CARE EDUCATION/TRAINING PROGRAM | Admitting: STUDENT IN AN ORGANIZED HEALTH CARE EDUCATION/TRAINING PROGRAM
Payer: COMMERCIAL

## 2025-07-01 ENCOUNTER — TELEPHONE (OUTPATIENT)
Dept: GASTROENTEROLOGY | Facility: CLINIC | Age: 45
End: 2025-07-01
Payer: COMMERCIAL

## 2025-07-01 NOTE — TELEPHONE ENCOUNTER
Pre visit planning completed.      Procedure details:    Patient scheduled for Colonoscopy on 07.17.2025.     Arrival time: 1345. Procedure time 1430    Facility location: [facility]; [address] [place] [address] [place] [address]. Check in location: 1st Floor [facility].     Sedation type: Conscious sedation     Pre op exam needed? No.    Indication for procedure: Screen for colon cancer       Chart review:     Electronic implanted devices? No    Recent diagnosis of diverticulitis within the last 6 weeks? No      Medication review:    Diabetic? No    Anticoagulants? No    Weight loss medication/injectable? No GLP-1 medication per patient's medication list. Nursing to verify with pre-assessment call.    Other medication HOLDING recommendations:  Ferrous sulfate (iron supplements): HOLD 7 days before procedure.      Prep for procedure:     Bowel prep recommendation: Standard Miralax.   Due to: standard bowel prep    Procedure information and instructions sent via [employer]         Haylee Rodrigeuz RN  Endoscopy Procedure Pre Assessment   [phone] option 3

## 2025-07-01 NOTE — TELEPHONE ENCOUNTER
Caller: Writer marc Chacon - Patient    Reason for Reschedule/Cancellation (please be detailed, any staff messages or encounters to note?):   Patient has a low hemoglobin & needs a hospital setting d/t ASC exclusion criteria - per RN Assessment    Did you cancel or rescheduled an EUS procedure? No.    Is screening questionnaire older than 3 months from the reschedule date.   If Yes, please complete screening questionnaire. No    Prior to reschedule please review:  Ordering Provider: Guy Paulson  Sedation Determined: Moderate  Does patient have any ASC Exclusions, please identify?: Yes, Low Hemoglobin    Notes on Cancelled Procedure:  Procedure: Lower Endoscopy [Colonoscopy]   Date: 7/17/25  Location: [facility]; [address], [place] [address]   Surgeon: Nancy    Rescheduled: No, LVM & MYC sent - CASE STILL ON SNAPBOARD UNTIL PATIENT CONTACTED

## 2025-07-01 NOTE — TELEPHONE ENCOUNTER
"Endoscopy Scheduling Screen    Caller: patient    Have you had any respiratory illness or flu-like symptoms in the last 10 days?  No    Patient is ACTIVE on [employer].  Inform patient that all appointment instructions will be sent via [employer].    Review patient's insurance for any non participating payor.    Ordering/Referring Provider:     PALOMA RAMÍREZ      (If ordering provider performs procedure, schedule with ordering provider unless otherwise instructed. )    BMI: Estimated body mass index is 31.53 kg/m  as calculated from the following:    Height as of 6/19/25: 1.6 m (5' 3\").    Weight as of 6/19/25: 80.7 kg (178 lb).     Sedation Ordered  moderate sedation.   If patient BMI > 50 do not schedule in ASC.    If patient BMI > 45 [facility]    Are you taking methadone or Suboxone?  NO, No RN review required.    Have you been diagnosed and are being treated for severe PTSD or severe anxiety?  NO, No RN review required.    Are you taking any prescription medications for pain 3 or more times per week?   NO, No RN review required.    Do you have a history of malignant hyperthermia?  No    (Females) Are you currently pregnant?   No     Have you been diagnosed or told you have pulmonary hypertension?   No    Do you have an LVAD?  No    Have you been told you have moderate to severe sleep apnea?  No.    Have you been told you have COPD, asthma, or any other lung disease?  No    Has your doctor ordered any cardiac tests like echo, angiogram, stress test, ablation, or EKG, that you have not completed yet?  No    Do you  have a history of any heart conditions?  No     Have you ever had or are you waiting for an organ transplant?  No. Continue scheduling, no site restrictions.    Have you had a stroke or transient ischemic attack (TIA aka \"mini stroke\") in the last 2 years?   No.    Have you been diagnosed with or been told you have cirrhosis of the liver?   No.    Are you currently on " "dialysis?   No    Do you need assistance transferring?   No    BMI: Estimated body mass index is 31.53 kg/m  as calculated from the following:    Height as of 6/19/25: 1.6 m (5' 3\").    Weight as of 6/19/25: 80.7 kg (178 lb).     Is patients BMI > 40 and scheduling location [facility]?  No    Do you take an injectable or oral medication for weight loss or diabetes (excluding insulin)?  No    Do you take the medication Naltrexone?  No    Do you take blood thinners?  No       Prep   Are you currently on dialysis or do you have chronic kidney disease?  No    Do you have a diagnosis of diabetes?  No    Do you have a diagnosis of cystic fibrosis (CF)?  No    On a regular basis do you go 3 -5 days between bowel movements?  No    BMI > 40?  No    Preferred Pharmacy:    [facility] [place] - [address] [facility]  [address] [facility] [address]  Phone: [phone] Fax: [phone]      Final Scheduling Details     Procedure scheduled  Colonoscopy    Surgeon:  Nancy     Date of procedure:  7/17     Pre-OP / PAC:   No - Not required for this site.    Location  [facility] - ASC - Patient preference.    Sedation   Moderate Sedation - Per order.      Patient Reminders:   You will receive a call from a Nurse to review instructions and health history.  This assessment must be completed prior to your procedure.  Failure to complete the Nurse assessment may result in the procedure being cancelled.      On the day of your procedure, please designate an adult(s) who can drive you home stay with you for the next 24 hours. The medicines used in the exam will make you sleepy. You will not be able to drive.      You cannot take public transportation, ride share services, or non-medical taxi service without a responsible caregiver.  Medical transport services are allowed with the requirement that a responsible caregiver will receive you at your destination.  We require that drivers and caregivers are confirmed prior " to your procedure.

## 2025-07-01 NOTE — TELEPHONE ENCOUNTER
Rescheduled: Yes,   Procedure: Lower Endoscopy [Colonoscopy]    Date: 7/17   Location: [facility]; [facility] [address] [place] [address]    Surgeon: juan miguel   Sedation Level Scheduled  mod ,  Reason for Sedation Level ordered   Instructions updated and sent: y     Does patient need PAC or Pre -Op Rescheduled? : no

## 2025-07-02 NOTE — TELEPHONE ENCOUNTER
Pre assessment completed for upcoming procedure.   (Please see previous telephone encounter notes for complete details)      Procedure details:    Procedure date 7/17/25, arrival time 1345 and facility location reviewed.    Pre op exam needed? No.    Designated  policy reviewed. Instructed to have someone stay 6  hours post procedure.       Medication review:    Medications reviewed. Please see supporting documentation below. Holding recommendations discussed (if applicable).       Prep for procedure:     Patient declined to review procedure prep instructions on the phone. Instructed patient to review the procedure prep instructions at least 7 days prior to procedure due to necessary dietary modifications. NPO instructions reviewed.    Patient was instructed to call if any questions or concerns arise.         Any additional information needed:  N/A      Patient verbalized understanding and had no questions or concerns at this time.      Ronda Garcia RN  Endoscopy Procedure Pre Assessment   [phone] option 3

## 2025-07-14 ENCOUNTER — OFFICE VISIT (OUTPATIENT)
Dept: FAMILY MEDICINE | Facility: CLINIC | Age: 45
End: 2025-07-14
Payer: COMMERCIAL

## 2025-07-14 VITALS
RESPIRATION RATE: 18 BRPM | BODY MASS INDEX: 31.54 KG/M2 | SYSTOLIC BLOOD PRESSURE: 117 MMHG | OXYGEN SATURATION: 100 % | HEIGHT: 63 IN | WEIGHT: 178 LBS | TEMPERATURE: 98.3 F | DIASTOLIC BLOOD PRESSURE: 82 MMHG | HEART RATE: 74 BPM

## 2025-07-14 DIAGNOSIS — N97.9 FEMALE INFERTILITY: ICD-10-CM

## 2025-07-14 DIAGNOSIS — K59.09 CHRONIC CONSTIPATION: ICD-10-CM

## 2025-07-14 DIAGNOSIS — E55.9 VITAMIN D DEFICIENCY: Chronic | ICD-10-CM

## 2025-07-14 DIAGNOSIS — E03.8 SUBCLINICAL HYPOTHYROIDISM: ICD-10-CM

## 2025-07-14 DIAGNOSIS — D50.8 OTHER IRON DEFICIENCY ANEMIA: Primary | ICD-10-CM

## 2025-07-14 DIAGNOSIS — R53.83 OTHER FATIGUE: ICD-10-CM

## 2025-07-14 LAB
BASOPHILS # BLD AUTO: 0 10E3/UL (ref 0–0.2)
BASOPHILS NFR BLD AUTO: 0 %
EOSINOPHIL # BLD AUTO: 0.1 10E3/UL (ref 0–0.7)
EOSINOPHIL NFR BLD AUTO: 1 %
ERYTHROCYTE [DISTWIDTH] IN BLOOD BY AUTOMATED COUNT: 19.3 % (ref 10–15)
FERRITIN SERPL-MCNC: 6 NG/ML (ref 6–175)
HCT VFR BLD AUTO: 31.4 % (ref 35–47)
HGB BLD-MCNC: 9.1 G/DL (ref 11.7–15.7)
IMM GRANULOCYTES # BLD: 0 10E3/UL
IMM GRANULOCYTES NFR BLD: 0 %
IRON BINDING CAPACITY (ROCHE): 418 UG/DL (ref 240–430)
IRON SATN MFR SERPL: 4 % (ref 15–46)
IRON SERPL-MCNC: 16 UG/DL (ref 37–145)
LYMPHOCYTES # BLD AUTO: 1.4 10E3/UL (ref 0.8–5.3)
LYMPHOCYTES NFR BLD AUTO: 31 %
MCH RBC QN AUTO: 18.8 PG (ref 26.5–33)
MCHC RBC AUTO-ENTMCNC: 29 G/DL (ref 31.5–36.5)
MCV RBC AUTO: 65 FL (ref 78–100)
MONOCYTES # BLD AUTO: 0.3 10E3/UL (ref 0–1.3)
MONOCYTES NFR BLD AUTO: 7 %
NEUTROPHILS # BLD AUTO: 2.8 10E3/UL (ref 1.6–8.3)
NEUTROPHILS NFR BLD AUTO: 60 %
PLATELET # BLD AUTO: 288 10E3/UL (ref 150–450)
RBC # BLD AUTO: 4.85 10E6/UL (ref 3.8–5.2)
WBC # BLD AUTO: 4.7 10E3/UL (ref 4–11)

## 2025-07-14 PROCEDURE — 3074F SYST BP LT 130 MM HG: CPT | Performed by: FAMILY MEDICINE

## 2025-07-14 PROCEDURE — 82728 ASSAY OF FERRITIN: CPT | Performed by: FAMILY MEDICINE

## 2025-07-14 PROCEDURE — 1126F AMNT PAIN NOTED NONE PRSNT: CPT | Performed by: FAMILY MEDICINE

## 2025-07-14 PROCEDURE — G2211 COMPLEX E/M VISIT ADD ON: HCPCS | Performed by: FAMILY MEDICINE

## 2025-07-14 PROCEDURE — 83540 ASSAY OF IRON: CPT | Performed by: FAMILY MEDICINE

## 2025-07-14 PROCEDURE — 99215 OFFICE O/P EST HI 40 MIN: CPT | Performed by: FAMILY MEDICINE

## 2025-07-14 PROCEDURE — 36415 COLL VENOUS BLD VENIPUNCTURE: CPT | Performed by: FAMILY MEDICINE

## 2025-07-14 PROCEDURE — 85025 COMPLETE CBC W/AUTO DIFF WBC: CPT | Performed by: FAMILY MEDICINE

## 2025-07-14 PROCEDURE — 83550 IRON BINDING TEST: CPT | Performed by: FAMILY MEDICINE

## 2025-07-14 PROCEDURE — 3079F DIAST BP 80-89 MM HG: CPT | Performed by: FAMILY MEDICINE

## 2025-07-14 RX ORDER — ERGOCALCIFEROL 1.25 MG/1
50000 CAPSULE, LIQUID FILLED ORAL WEEKLY
Qty: 4 CAPSULE | Refills: 3 | Status: SHIPPED | OUTPATIENT
Start: 2025-07-14

## 2025-07-14 ASSESSMENT — PAIN SCALES - GENERAL: PAINLEVEL_OUTOF10: NO PAIN (0)

## 2025-07-14 NOTE — PATIENT INSTRUCTIONS
Walk next door to pharmacy to check HPV shot  Colonoscopy as planned  Contact IVF people as discussed  Start prenatal vitamin instead

## 2025-07-14 NOTE — PROGRESS NOTES
Assessment & Plan     Other iron deficiency anemia  Improved today compared last visit, advised increasing iron with miralax so it helps with constipation, increase iron rich foods.  No signs of bleeding, normal periods  - Ferrous Sulfate (IRON HIGH-POTENCY) 45 MG TBCR; Take 142 mg by mouth daily.  - CBC with platelets and differential; Future  - Iron and iron binding capacity; Future  - Ferritin; Future  - CBC with platelets and differential  - Iron and iron binding capacity  - Ferritin    Vitamin D deficiency  supplement  - vitamin D2 (ERGOCALCIFEROL) 83278 units (1250 mcg) capsule; Take 1 capsule (50,000 Units) by mouth once a week.    Subclinical hypothyroidism  Stable    Chronic constipation  As above , increase fluids, miralax, suppository as needed, regular exercises    Female infertility  She has failed 3 rounds of IVF, in phan and here, she is thinking about doing donor egg, she will reach out to fertility clinic     Other fatigue  Sleep interrupted daily byher  getting home late, advised  7 hours of sleep, denied symptoms of sleep apnea.   History low moods, she feels like that has been stable, advised increased  Follow-up       Spent  45min greater than 50% of counseling and coordination of care for the conditions documented above.    Jann Chacon is a 44 year old, presenting for the following health issues:  RECHECK    History of Present Illness       Reason for visit:  Iron check up    She eats 0-1 servings of fruits and vegetables daily.She consumes 1 sweetened beverage(s) daily.She exercises with enough effort to increase her heart rate 20 to 29 minutes per day.  She exercises with enough effort to increase her heart rate 3 or less days per week. She is missing 3 dose(s) of medications per week.      Her  and her are having a tough time, he gets home late, disrupts her sleep, he is drinking and smoking and not quitting like he said he would   Period July 4th -period , not  "heavy  No rectal bleeding    Exercising  Doing yoga at home  Iron 2 times a week with vit C    Not taking vit D      Review of Systems  Constitutional, HEENT, cardiovascular, pulmonary, GI, , musculoskeletal, neuro, skin, endocrine and psych systems are negative, except as otherwise noted.      Objective    /82   Pulse 74   Temp 98.3  F (36.8  C) (Oral)   Resp 18   Ht 1.6 m (5' 3\")   Wt 80.7 kg (178 lb)   LMP 07/04/2025 (Exact Date)   SpO2 100%   BMI 31.53 kg/m    Body mass index is 31.53 kg/m .  Physical Exam   GENERAL: alert and no distress  EYES: Eyes grossly normal to inspection, PERRL and conjunctivae and sclerae normal  HENT: ear canals and TM's normal, nose and mouth without ulcers or lesions  NECK: no adenopathy, no asymmetry, masses, or scars  RESP: lungs clear to auscultation - no rales, rhonchi or wheezes  CV: regular rate and rhythm, normal S1 S2, no S3 or S4, no murmur, click or rub, no peripheral edema  ABDOMEN: soft, nontender, no hepatosplenomegaly, no masses and bowel sounds normal  MS: no gross musculoskeletal defects noted, no edema  SKIN: no suspicious lesions or rashes  NEURO: Normal strength and tone, mentation intact and speech normal  PSYCH: mentation appears normal, affect normal/bright    Results for orders placed or performed in visit on 07/14/25   CBC with platelets and differential     Status: Abnormal   Result Value Ref Range    WBC Count 4.7 4.0 - 11.0 10e3/uL    RBC Count 4.85 3.80 - 5.20 10e6/uL    Hemoglobin 9.1 (L) 11.7 - 15.7 g/dL    Hematocrit 31.4 (L) 35.0 - 47.0 %    MCV 65 (L) 78 - 100 fL    MCH 18.8 (L) 26.5 - 33.0 pg    MCHC 29.0 (L) 31.5 - 36.5 g/dL    RDW 19.3 (H) 10.0 - 15.0 %    Platelet Count 288 150 - 450 10e3/uL    % Neutrophils 60 %    % Lymphocytes 31 %    % Monocytes 7 %    % Eosinophils 1 %    % Basophils 0 %    % Immature Granulocytes 0 %    Absolute Neutrophils 2.8 1.6 - 8.3 10e3/uL    Absolute Lymphocytes 1.4 0.8 - 5.3 10e3/uL    Absolute " Monocytes 0.3 0.0 - 1.3 10e3/uL    Absolute Eosinophils 0.1 0.0 - 0.7 10e3/uL    Absolute Basophils 0.0 0.0 - 0.2 10e3/uL    Absolute Immature Granulocytes 0.0 <=0.4 10e3/uL   CBC with platelets and differential     Status: Abnormal    Narrative    The following orders were created for panel order CBC with platelets and differential.  Procedure                               Abnormality         Status                     ---------                               -----------         ------                     CBC with platelets and ...[[id number]]  Abnormal            Final result                 Please view results for these tests on the individual orders.             Signed Electronically by: Guy Paulson DO

## 2025-07-17 ENCOUNTER — HOSPITAL ENCOUNTER (OUTPATIENT)
Facility: CLINIC | Age: 45
Discharge: HOME | End: 2025-07-17
Attending: STUDENT IN AN ORGANIZED HEALTH CARE EDUCATION/TRAINING PROGRAM | Admitting: STUDENT IN AN ORGANIZED HEALTH CARE EDUCATION/TRAINING PROGRAM
Payer: COMMERCIAL

## 2025-07-17 VITALS
HEIGHT: 63 IN | WEIGHT: 178 LBS | OXYGEN SATURATION: 100 % | HEART RATE: 71 BPM | SYSTOLIC BLOOD PRESSURE: 119 MMHG | RESPIRATION RATE: 15 BRPM | BODY MASS INDEX: 31.54 KG/M2 | DIASTOLIC BLOOD PRESSURE: 83 MMHG

## 2025-07-17 LAB — COLONOSCOPY: NORMAL

## 2025-07-17 PROCEDURE — G0121 COLON CA SCRN NOT HI RSK IND: HCPCS | Performed by: STUDENT IN AN ORGANIZED HEALTH CARE EDUCATION/TRAINING PROGRAM

## 2025-07-17 PROCEDURE — 99153 MOD SED SAME PHYS/QHP EA: CPT | Performed by: STUDENT IN AN ORGANIZED HEALTH CARE EDUCATION/TRAINING PROGRAM

## 2025-07-17 PROCEDURE — G0500 MOD SEDAT ENDO SERVICE >5YRS: HCPCS | Performed by: STUDENT IN AN ORGANIZED HEALTH CARE EDUCATION/TRAINING PROGRAM

## 2025-07-17 PROCEDURE — 250N000011 HC RX IP 250 OP 636: Performed by: STUDENT IN AN ORGANIZED HEALTH CARE EDUCATION/TRAINING PROGRAM

## 2025-07-17 RX ORDER — FENTANYL CITRATE 50 UG/ML
INJECTION, SOLUTION INTRAMUSCULAR; INTRAVENOUS PRN
Status: DISCONTINUED | OUTPATIENT
Start: 2025-07-17 | End: 2025-07-17 | Stop reason: HOSPADM

## 2025-07-17 ASSESSMENT — ACTIVITIES OF DAILY LIVING (ADL)
ADLS_ACUITY_SCORE: 41
ADLS_ACUITY_SCORE: 41

## 2025-07-17 NOTE — H&P
"Endoscopy H&P    Ines Gaitan  [id number]  female  44 year old      Reason for procedure/surgery: Screening colonoscopy. None prior.     Maternal grandmother had a \"stomach cancer\" unsure details, age > 60.    Patient Active Problem List   Diagnosis    CARDIOVASCULAR SCREENING; LDL GOAL LESS THAN 160    Chronic constipation    Vitamin D deficiency    Pain in thoracic spine    Iron deficiency anemia, unspecified iron deficiency anemia type    Subclinical hypothyroidism    Female infertility    Class 1 obesity due to excess calories without serious comorbidity with body mass index (BMI) of 32.0 to 32.9 in adult    Left knee pain    Acute right-sided low back pain without sciatica       Past Surgical History:    Past Surgical History:   Procedure Laterality Date    HC TREATMENT MISSED  SURG, 1ST TRIMESTER  2016    LAPAROSCOPIC SALPINGECTOMY Left 2019    small hydrosalpinx       Past Medical History:   Past Medical History:   Diagnosis Date    Female infertility 2019    MEDICAL HISTORY OF - 2006    infection of L5-S1.  ?abscess Treatment in [place]    Subclinical hyperthyroidism 2016    resolved; had positive TSI and normal thyroid ultrasound    Tuberculosis        Social History:   Social History     Tobacco Use    Smoking status: Never     Passive exposure: Never    Smokeless tobacco: Never   Substance Use Topics    Alcohol use: No       Family History:   Family History   Problem Relation Age of Onset    Hypertension Mother     Diabetes Father     Cerebrovascular Disease Father     Heart Surgery Father     Coronary Artery Disease Father     Glaucoma Father     Thyroid Disease Sister     Cancer Maternal Grandmother         liver    Thyroid Disease Sister     Macular Degeneration No family hx of        Allergies: No Known Allergies    Active Medications:   No current outpatient medications on file.       Systemic Review:   CONSTITUTIONAL: NEGATIVE for fever, chills, change in weight  ENT/MOUTH: NEGATIVE " "for ear, mouth and throat problems  RESP: NEGATIVE for significant cough or SOB  CV: NEGATIVE for chest pain, palpitations or peripheral edema    Physical Examination:   Vital Signs: /80   Pulse 80   Resp 18   Ht 1.6 m (5' 3\")   Wt 80.7 kg (178 lb)   LMP 06/10/2025   SpO2 98%   BMI 31.53 kg/m    GENERAL: healthy, alert and no distress  NECK: no adenopathy, no asymmetry, masses, or scars  RESP: lungs clear to auscultation - no rales, rhonchi or wheezes  CV: regular rate and rhythm, normal S1 S2, no S3 or S4, no murmur, click or rub, no peripheral edema and peripheral pulses strong  ABDOMEN: soft, nontender, no hepatosplenomegaly, no masses and bowel sounds normal  MS: no gross musculoskeletal defects noted, no edema    Plan: Appropriate to proceed as scheduled.      Paige Yadav MD  7/17/2025    PCP:  Guy Paulson    "

## (undated) RX ORDER — FENTANYL CITRATE 50 UG/ML
INJECTION, SOLUTION INTRAMUSCULAR; INTRAVENOUS
Status: DISPENSED
Start: 2025-07-17